# Patient Record
Sex: FEMALE | Race: WHITE
[De-identification: names, ages, dates, MRNs, and addresses within clinical notes are randomized per-mention and may not be internally consistent; named-entity substitution may affect disease eponyms.]

---

## 2017-03-15 ENCOUNTER — HOSPITAL ENCOUNTER (INPATIENT)
Dept: HOSPITAL 47 - EC | Age: 72
LOS: 5 days | Discharge: HOME | DRG: 871 | End: 2017-03-20
Payer: MEDICARE

## 2017-03-15 VITALS — BODY MASS INDEX: 24.9 KG/M2

## 2017-03-15 DIAGNOSIS — Z79.899: ICD-10-CM

## 2017-03-15 DIAGNOSIS — Z80.8: ICD-10-CM

## 2017-03-15 DIAGNOSIS — Z88.5: ICD-10-CM

## 2017-03-15 DIAGNOSIS — Z82.49: ICD-10-CM

## 2017-03-15 DIAGNOSIS — N39.0: ICD-10-CM

## 2017-03-15 DIAGNOSIS — Z99.81: ICD-10-CM

## 2017-03-15 DIAGNOSIS — N18.3: ICD-10-CM

## 2017-03-15 DIAGNOSIS — K44.9: ICD-10-CM

## 2017-03-15 DIAGNOSIS — R94.31: ICD-10-CM

## 2017-03-15 DIAGNOSIS — R51: ICD-10-CM

## 2017-03-15 DIAGNOSIS — I25.2: ICD-10-CM

## 2017-03-15 DIAGNOSIS — T45.515A: ICD-10-CM

## 2017-03-15 DIAGNOSIS — I65.23: ICD-10-CM

## 2017-03-15 DIAGNOSIS — F41.9: ICD-10-CM

## 2017-03-15 DIAGNOSIS — M43.16: ICD-10-CM

## 2017-03-15 DIAGNOSIS — Z79.891: ICD-10-CM

## 2017-03-15 DIAGNOSIS — I50.33: ICD-10-CM

## 2017-03-15 DIAGNOSIS — Z79.82: ICD-10-CM

## 2017-03-15 DIAGNOSIS — J44.1: ICD-10-CM

## 2017-03-15 DIAGNOSIS — M19.90: ICD-10-CM

## 2017-03-15 DIAGNOSIS — Z88.8: ICD-10-CM

## 2017-03-15 DIAGNOSIS — D62: ICD-10-CM

## 2017-03-15 DIAGNOSIS — I08.3: ICD-10-CM

## 2017-03-15 DIAGNOSIS — K21.9: ICD-10-CM

## 2017-03-15 DIAGNOSIS — I13.0: ICD-10-CM

## 2017-03-15 DIAGNOSIS — I48.0: ICD-10-CM

## 2017-03-15 DIAGNOSIS — Z86.010: ICD-10-CM

## 2017-03-15 DIAGNOSIS — Z88.2: ICD-10-CM

## 2017-03-15 DIAGNOSIS — R53.1: ICD-10-CM

## 2017-03-15 DIAGNOSIS — K57.30: ICD-10-CM

## 2017-03-15 DIAGNOSIS — K29.60: ICD-10-CM

## 2017-03-15 DIAGNOSIS — Z87.19: ICD-10-CM

## 2017-03-15 DIAGNOSIS — M46.96: ICD-10-CM

## 2017-03-15 DIAGNOSIS — J44.0: ICD-10-CM

## 2017-03-15 DIAGNOSIS — Z91.040: ICD-10-CM

## 2017-03-15 DIAGNOSIS — F17.210: ICD-10-CM

## 2017-03-15 DIAGNOSIS — Z86.73: ICD-10-CM

## 2017-03-15 DIAGNOSIS — Z71.3: ICD-10-CM

## 2017-03-15 DIAGNOSIS — Z80.1: ICD-10-CM

## 2017-03-15 DIAGNOSIS — Z90.710: ICD-10-CM

## 2017-03-15 DIAGNOSIS — Z95.5: ICD-10-CM

## 2017-03-15 DIAGNOSIS — Z79.01: ICD-10-CM

## 2017-03-15 DIAGNOSIS — Z84.1: ICD-10-CM

## 2017-03-15 DIAGNOSIS — R74.8: ICD-10-CM

## 2017-03-15 DIAGNOSIS — R73.9: ICD-10-CM

## 2017-03-15 DIAGNOSIS — I73.9: ICD-10-CM

## 2017-03-15 DIAGNOSIS — K92.2: ICD-10-CM

## 2017-03-15 DIAGNOSIS — Z90.49: ICD-10-CM

## 2017-03-15 DIAGNOSIS — J45.901: ICD-10-CM

## 2017-03-15 DIAGNOSIS — A41.51: Primary | ICD-10-CM

## 2017-03-15 DIAGNOSIS — I27.2: ICD-10-CM

## 2017-03-15 DIAGNOSIS — I25.5: ICD-10-CM

## 2017-03-15 DIAGNOSIS — M51.36: ICD-10-CM

## 2017-03-15 DIAGNOSIS — E78.5: ICD-10-CM

## 2017-03-15 DIAGNOSIS — J96.01: ICD-10-CM

## 2017-03-15 DIAGNOSIS — I25.10: ICD-10-CM

## 2017-03-15 LAB
ALP SERPL-CCNC: 83 U/L (ref 38–126)
ALT SERPL-CCNC: 27 U/L (ref 9–52)
ANION GAP SERPL CALC-SCNC: 11 MMOL/L
APTT BLD: 22.1 SEC (ref 22–30)
AST SERPL-CCNC: 22 U/L (ref 14–36)
BUN SERPL-SCNC: 14 MG/DL (ref 7–17)
CALCIUM SPEC-MCNC: 8.6 MG/DL (ref 8.4–10.2)
CELLS COUNTED: 100
CH: 23.6
CHCM: 28.9
CHLORIDE SERPL-SCNC: 101 MMOL/L (ref 98–107)
CK SERPL-CCNC: 29 U/L (ref 30–135)
CO2 SERPL-SCNC: 27 MMOL/L (ref 22–30)
ERYTHROCYTE [DISTWIDTH] IN BLOOD BY AUTOMATED COUNT: 2.98 M/UL (ref 3.8–5.4)
ERYTHROCYTE [DISTWIDTH] IN BLOOD: 16.7 % (ref 11.5–15.5)
GLUCOSE BLD-MCNC: 126 MG/DL (ref 75–99)
GLUCOSE SERPL-MCNC: 111 MG/DL (ref 74–99)
HCT VFR BLD AUTO: 24.2 % (ref 34–46)
HDW: 4.52
HGB BLD-MCNC: 7.1 GM/DL (ref 11.4–16)
INR PPP: 1.1 (ref ?–1.1)
MAGNESIUM SPEC-SCNC: 2.1 MG/DL (ref 1.6–2.3)
MCH RBC QN AUTO: 23.8 PG (ref 25–35)
MCHC RBC AUTO-ENTMCNC: 29.3 G/DL (ref 31–37)
MCV RBC AUTO: 81.1 FL (ref 80–100)
NON-AFRICAN AMERICAN GFR(MDRD): >60
PARTICLE COUNT: (no result)
PH UR: 6 [PH] (ref 5–8)
POTASSIUM SERPL-SCNC: 4.1 MMOL/L (ref 3.5–5.1)
PROT SERPL-MCNC: 6.1 G/DL (ref 6.3–8.2)
PROT UR QL: (no result)
PT BLD: 10.6 SEC (ref 9–12)
SODIUM SERPL-SCNC: 139 MMOL/L (ref 137–145)
SP GR UR: 1.01 (ref 1–1.03)
SQUAMOUS UR QL AUTO: <1 /HPF (ref 0–4)
TROPONIN I SERPL-MCNC: 0.09 NG/ML (ref 0–0.03)
UA BILLING (MACRO VS. MICRO): (no result)
UROBILINOGEN UR QL STRIP: <2 MG/DL (ref ?–2)
WBC # BLD AUTO: 8.7 K/UL (ref 3.8–10.6)
WBC #/AREA URNS HPF: 31 /HPF (ref 0–5)
WBC (PEROX): 9.38

## 2017-03-15 PROCEDURE — 71020: CPT

## 2017-03-15 PROCEDURE — 87040 BLOOD CULTURE FOR BACTERIA: CPT

## 2017-03-15 PROCEDURE — 30233N1 TRANSFUSION OF NONAUTOLOGOUS RED BLOOD CELLS INTO PERIPHERAL VEIN, PERCUTANEOUS APPROACH: ICD-10-PCS

## 2017-03-15 PROCEDURE — 93005 ELECTROCARDIOGRAM TRACING: CPT

## 2017-03-15 PROCEDURE — 85610 PROTHROMBIN TIME: CPT

## 2017-03-15 PROCEDURE — 94760 N-INVAS EAR/PLS OXIMETRY 1: CPT

## 2017-03-15 PROCEDURE — 87077 CULTURE AEROBIC IDENTIFY: CPT

## 2017-03-15 PROCEDURE — 83036 HEMOGLOBIN GLYCOSYLATED A1C: CPT

## 2017-03-15 PROCEDURE — 85025 COMPLETE CBC W/AUTO DIFF WBC: CPT

## 2017-03-15 PROCEDURE — 91110 GI TRC IMG INTRAL ESOPH-ILE: CPT

## 2017-03-15 PROCEDURE — 94660 CPAP INITIATION&MGMT: CPT

## 2017-03-15 PROCEDURE — 82550 ASSAY OF CK (CPK): CPT

## 2017-03-15 PROCEDURE — 83605 ASSAY OF LACTIC ACID: CPT

## 2017-03-15 PROCEDURE — 87086 URINE CULTURE/COLONY COUNT: CPT

## 2017-03-15 PROCEDURE — 81001 URINALYSIS AUTO W/SCOPE: CPT

## 2017-03-15 PROCEDURE — 84100 ASSAY OF PHOSPHORUS: CPT

## 2017-03-15 PROCEDURE — 96367 TX/PROPH/DG ADDL SEQ IV INF: CPT

## 2017-03-15 PROCEDURE — 84484 ASSAY OF TROPONIN QUANT: CPT

## 2017-03-15 PROCEDURE — 83880 ASSAY OF NATRIURETIC PEPTIDE: CPT

## 2017-03-15 PROCEDURE — 85730 THROMBOPLASTIN TIME PARTIAL: CPT

## 2017-03-15 PROCEDURE — 87186 SC STD MICRODIL/AGAR DIL: CPT

## 2017-03-15 PROCEDURE — 83540 ASSAY OF IRON: CPT

## 2017-03-15 PROCEDURE — 70450 CT HEAD/BRAIN W/O DYE: CPT

## 2017-03-15 PROCEDURE — 0T9B70Z DRAINAGE OF BLADDER WITH DRAINAGE DEVICE, VIA NATURAL OR ARTIFICIAL OPENING: ICD-10-PCS

## 2017-03-15 PROCEDURE — 96365 THER/PROPH/DIAG IV INF INIT: CPT

## 2017-03-15 PROCEDURE — 71010: CPT

## 2017-03-15 PROCEDURE — 80048 BASIC METABOLIC PNL TOTAL CA: CPT

## 2017-03-15 PROCEDURE — 82272 OCCULT BLD FECES 1-3 TESTS: CPT

## 2017-03-15 PROCEDURE — 82728 ASSAY OF FERRITIN: CPT

## 2017-03-15 PROCEDURE — 36415 COLL VENOUS BLD VENIPUNCTURE: CPT

## 2017-03-15 PROCEDURE — 82553 CREATINE MB FRACTION: CPT

## 2017-03-15 PROCEDURE — 36430 TRANSFUSION BLD/BLD COMPNT: CPT

## 2017-03-15 PROCEDURE — 80053 COMPREHEN METABOLIC PANEL: CPT

## 2017-03-15 PROCEDURE — 83735 ASSAY OF MAGNESIUM: CPT

## 2017-03-15 PROCEDURE — 86850 RBC ANTIBODY SCREEN: CPT

## 2017-03-15 PROCEDURE — 86920 COMPATIBILITY TEST SPIN: CPT

## 2017-03-15 PROCEDURE — 86900 BLOOD TYPING SEROLOGIC ABO: CPT

## 2017-03-15 PROCEDURE — 86901 BLOOD TYPING SEROLOGIC RH(D): CPT

## 2017-03-15 PROCEDURE — 94640 AIRWAY INHALATION TREATMENT: CPT

## 2017-03-15 PROCEDURE — 93306 TTE W/DOPPLER COMPLETE: CPT

## 2017-03-15 PROCEDURE — 87502 INFLUENZA DNA AMP PROBE: CPT

## 2017-03-15 PROCEDURE — 96361 HYDRATE IV INFUSION ADD-ON: CPT

## 2017-03-15 PROCEDURE — 83550 IRON BINDING TEST: CPT

## 2017-03-15 PROCEDURE — 99291 CRITICAL CARE FIRST HOUR: CPT

## 2017-03-15 RX ADMIN — HYDROMORPHONE HYDROCHLORIDE PRN MG: 1 INJECTION, SOLUTION INTRAMUSCULAR; INTRAVENOUS; SUBCUTANEOUS at 23:23

## 2017-03-15 RX ADMIN — SODIUM CHLORIDE, PRESERVATIVE FREE SCH ML: 5 INJECTION INTRAVENOUS at 22:58

## 2017-03-15 RX ADMIN — ACETAMINOPHEN STA: 500 TABLET ORAL at 19:09

## 2017-03-15 RX ADMIN — ACETAMINOPHEN STA MG: 500 TABLET ORAL at 18:00

## 2017-03-15 NOTE — ED
General Adult HPI





- General


Source: patient, EMS, RN notes reviewed


Mode of arrival: EMS


Limitations: no limitations





<Mj Norris - Last Filed: 03/15/17 18:57>





<Deuce Price - Last Filed: 03/15/17 20:58>





- General


Chief complaint: Shortness of Breath


Stated complaint: JOSE R


Time Seen by Provider: 03/15/17 17:45





- History of Present Illness


Initial comments: 





This is a 71-year-old female presents emergency Department with a high fever or 

shortness of breath positive sputum production with a lot of coughing.  Patient 

complains of quite a bit of chills and a mild headache.  Patient did not want 

take breathing treatment in route because she was so cold she just wanted to be 

covered up according to EMS.  Patient denies any chest pain or palpitations.  

Patient denies abdominal pain patient denies nausea vomiting diarrhea.  Patient 

denies any neck pain or stiffness.  Patient denies any lightheadedness 

dizziness or near syncopal episode.  Patient denies any dysuria hematuria 

urinary frequency. (Mj Norris)





- Related Data


 Home Medications











 Medication  Instructions  Recorded  Confirmed


 


Montelukast Sodium [Singulair] 10 mg PO HS 09/20/14 03/15/17


 


ALPRAZolam [Xanax] 0.5 mg PO DAILY 12/05/15 03/15/17


 


Apixaban [Eliquis] 2.5 mg PO BID 12/05/15 03/15/17


 


Aspirin 81 mg PO DAILY 12/05/15 03/15/17


 


Metoprolol Tartrate [Lopressor] 25 mg PO BID 12/05/15 03/15/17


 


Nitroglycerin Sl Tabs [Nitrostat] 0.4 mg SUBLINGUAL Q5M PRN 12/05/15 03/15/17


 


Atorvastatin [Lipitor] 40 mg PO HS 12/06/15 03/15/17


 


Ergocalciferol [Vitamin D2] 50,000 unit PO Q7D 03/15/17 03/15/17


 


Ipratropium-Albuterol Nebulize 3 ml INHALATION RT-QID PRN 03/15/17 03/15/17





[Duoneb 0.5 mg-3 mg/3 ml Soln]   


 


Melatonin 3 mg PO HS 03/15/17 03/15/17


 


Multivitamins, Thera [Multivitamin] 1 tab PO DAILY 03/15/17 03/15/17


 


Omeprazole 40 mg PO DAILY 03/15/17 03/15/17


 


amLODIPine [Norvasc] 10 mg PO DAILY 03/15/17 03/15/17


 


hydrALAZINE HCL [Apresoline] 75 mg PO Q8H 03/15/17 03/15/17


 


oxyCODONE HCL 20 mg PO TID 03/15/17 03/15/17








 Previous Rx's











 Medication  Instructions  Recorded


 


Losartan [Cozaar] 50 mg PO DAILY #30 tab 12/08/15











 Allergies











Allergy/AdvReac Type Severity Reaction Status Date / Time


 


latex Allergy  Rash/Hives Verified 03/15/17 18:15


 


morphine Allergy  Unknown Verified 03/15/17 18:15


 


pregabalin [From Lyrica] Allergy  Unknown Verified 03/15/17 18:15


 


Sulfa (Sulfonamide Allergy  Unknown Verified 03/15/17 18:15





Antibiotics)     














Review of Systems


ROS Other: All systems not noted in ROS Statement are negative.





<Mj Norris - Last Filed: 03/15/17 18:57>


ROS Other: All systems not noted in ROS Statement are negative.





<Deuce Price - Last Filed: 03/15/17 20:58>


ROS Statement: 


Those systems with pertinent positive or pertinent negative responses have been 

documented in the HPI.








Past Medical History


Past Medical History: Asthma, COPD, CVA/TIA, GERD/Reflux, Hypertension, Mitral 

Valve Prolapse (MVP), Vascular Disorder


Additional Past Medical History / Comment(s): diverticulitits


Last Myocardial Infarction Date:: unknown


History of Any Multi-Drug Resistant Organisms: None Reported


Past Surgical History: Cholecystectomy, Heart Catheterization With Stent, 

Hysterectomy, Orthopedic Surgery


Additional Past Surgical History / Comment(s): carotid


Past Anesthesia/Blood Transfusion Reactions: No Reported Reaction


Date of Last Stent Placement:: 


Past Psychological History: No Psychological Hx Reported


Smoking Status: Current every day smoker


Past Alcohol Use History: None Reported


Past Drug Use History: None Reported





- Past Family History


  ** Mother


Family Medical History: Congestive Heart Failure (CHF) (Mother  at age of 

76 from congestive heart failure)





  ** Father


Family Medical History: Cancer (Father  at age of 73 from bone cancer)


Additional Family Medical History / Comment(s): Bone ca





  ** Sister(s)


Family Medical History: Coronary Artery Disease (CAD) (Patient has one sister 

with coronary artery disease status post coronary artery bypass graft)





  ** Brother(s)


Family Medical History: Myocardial Infarction (MI) (She has a brother who had a 

myocardial infarction.)





<Mj Norris - Last Filed: 03/15/17 18:57>





General Exam


Limitations: no limitations





<Mj Norris - Last Filed: 03/15/17 18:57>





<Deuce Price - Last Filed: 03/15/17 20:58>





- General Exam Comments


Initial Comments: 





GENERAL:


Patient is well-developed and well-nourished.  Patient is nontoxic and well-

hydrated and is in mild distress.





ENT:


Neck is soft and supple.  No significant lymphadenopathy is noted.  Oropharynx 

is clear.  Moist mucous membranes.  Neck has full range of motion without 

eliciting any pain.  





EYES:


The sclera were anicteric and conjunctiva were pink and moist.  Extraocular 

movements were intact and pupils were equal round and reactive to light.  

Eyelids were unremarkable.





PULMONARY:


Unlabored respirations.  Good breath sounds bilaterally.  No audible rales 

rhonchi or wheezing was noted.





CARDIOVASCULAR:


There is a regular rate and rhythm without any murmurs gallops or rubs.  





ABDOMEN:


Soft and nontender with normal bowel sounds.  No palpable organomegaly was 

noted.  There is no palpable pulsatile mass.





SKIN:


Skin is clear with no lesions or rashes and otherwise unremarkable.





NEUROLOGIC:


Patient is alert and oriented x3.  Cranial nerves II through XII are grossly 

intact.  Motor and sensory are also intact.  Normal speech, volume and content.

  Symmetrical smile.  





MUSCULOSKELETAL:


Normal extremities with adequate strength and full range of motion.  No lower 

extremity swelling or edema.  No calf tenderness.





LYMPHATICS:


No significant lymphadenopathy is noted





PSYCHIATRIC:


Difficult to assess patient was agitated because she was uncomfortable with the 

chills.   (Mj Norris)





Medical Decision Making





- Lab Data


Result diagrams: 


 03/15/17 17:41





 03/15/17 17:41





<Mj Norris - Last Filed: 03/15/17 18:57>





- Lab Data


Result diagrams: 


 03/15/17 17:41





 03/15/17 17:41





- Radiology Data


Radiology results: report reviewed (Computed tomography scan of the brain shows 

no acute process.), image reviewed (Chest x-ray shows interstitial prominence.)





<Deuce Price - Last Filed: 03/15/17 20:58>





- Medical Decision Making





EKG shows a sinus tachycardia 108 bpm ME interval 274 QRS is 78 QT interval 338 

QTC is 1952.  Patient's EKG shows some ST segment depression in leads V4 

through V6.





Dr. Price with taking over the care of this patient at 7 PM





 (Mj Norris)





Patient reevaluated twice by myself.  Patient is now significantly improved 

with BiPAP.  Patient likely has commendations COPD with CHF.  Patient also has 

urinary tract infection and does meet sepsis criteria.  IV antibiotic will be 

started.  Sepsis diagnosed at 8:50 PM.  Case was discussed in detail twice with 

Dr. Hood who will admit his patient.





Patient states she sees Dr. Bolton for pulmonary. (Deuce Price)





- Lab Data


 Lab Results











  03/15/17 03/15/17 03/15/17 Range/Units





  17:41 17:41 17:41 


 


WBC   8.7   (3.8-10.6)  k/uL


 


RBC   2.98 L   (3.80-5.40)  m/uL


 


Hgb   7.1 L   (11.4-16.0)  gm/dL


 


Hct   24.2 L   (34.0-46.0)  %


 


MCV   81.1   (80.0-100.0)  fL


 


MCH   23.8 L   (25.0-35.0)  pg


 


MCHC   29.3 L   (31.0-37.0)  g/dL


 


RDW   16.7 H   (11.5-15.5)  %


 


Plt Count   236   (150-450)  k/uL


 


Neutrophils % (Manual)   93.0   %


 


Lymphocytes % (Manual)   5.0   %


 


Monocytes % (Manual)   2.0   %


 


Neutrophils # (Manual)   8.1 H   (1.3-7.7)  k/uL


 


Lymphocytes # (Manual)   0.4 L   (1.0-4.8)  k/uL


 


Monocytes # (Manual)   0.2   (0-1.0)  k/uL


 


Nucleated RBCs   0   (0-0)  /100 WBC


 


Manual Slide Review   Performed   


 


Polychromasia   Present   


 


Hypochromasia   Marked   


 


Hypochromasia (manual)   Present   


 


Poikilocytosis   Moderate   


 


Anisocytosis   Slight   


 


Anisocytosis (manual)   Present   


 


Stomatocytes   Present   


 


PT     (9.0-12.0)  sec


 


INR     (<1.1)  


 


APTT     (22.0-30.0)  sec


 


Sodium    139  (137-145)  mmol/L


 


Potassium    4.1  (3.5-5.1)  mmol/L


 


Chloride    101  ()  mmol/L


 


Carbon Dioxide    27  (22-30)  mmol/L


 


Anion Gap    11  mmol/L


 


BUN    14  (7-17)  mg/dL


 


Creatinine    0.80  (0.52-1.04)  mg/dL


 


Est GFR (MDRD) Af Amer    >60  (>60 ml/min/1.73 sqM)  


 


Est GFR (MDRD) Non-Af    >60  (>60 ml/min/1.73 sqM)  


 


Glucose    111 H  (74-99)  mg/dL


 


Plasma Lactic Acid Holland     (0.7-2.0)  mmol/L


 


Calcium    8.6  (8.4-10.2)  mg/dL


 


Magnesium    2.1  (1.6-2.3)  mg/dL


 


Total Bilirubin    0.6  (0.2-1.3)  mg/dL


 


AST    22  (14-36)  U/L


 


ALT    27  (9-52)  U/L


 


Alkaline Phosphatase    83  ()  U/L


 


Total Creatine Kinase  29 L    ()  U/L


 


CK-MB (CK-2)  0.5    (0.0-2.4)  ng/mL


 


CK-MB (CK-2) Rel Index  1.7    


 


Troponin I  0.089 H*    (0.000-0.034)  ng/mL


 


NT-Pro-B Natriuret Pep     pg/mL


 


Total Protein    6.1 L  (6.3-8.2)  g/dL


 


Albumin    3.6  (3.5-5.0)  g/dL


 


Urine Color     


 


Urine Appearance     (Clear)  


 


Urine pH     (5.0-8.0)  


 


Ur Specific Gravity     (1.001-1.035)  


 


Urine Protein     (Negative)  


 


Urine Glucose (UA)     (Negative)  


 


Urine Ketones     (Negative)  


 


Urine Blood     (Negative)  


 


Urine Nitrite     (Negative)  


 


Urine Bilirubin     (Negative)  


 


Urine Urobilinogen     (<2.0)  mg/dL


 


Ur Leukocyte Esterase     (Negative)  


 


Urine WBC     (0-5)  /hpf


 


Urine WBC Clumps     (None)  /hpf


 


Ur Squamous Epith Cells     (0-4)  /hpf


 


Urine Bacteria     (None)  /hpf


 


Hyaline Casts     (0-2)  /lpf


 


Urine Mucus     (None)  /hpf


 


Stool Occult Blood     (Negative)  


 


Influenza Type A RNA     (Not Detectd)  


 


Influenza Type B (PCR)     (Not Detectd)  


 


Blood Type     


 


Blood Type Recheck     


 


Antibody Screen     


 


Crossmatch     


 


Spec Expiration Date     














  03/15/17 03/15/17 03/15/17 Range/Units





  17:41 17:41 17:41 


 


WBC     (3.8-10.6)  k/uL


 


RBC     (3.80-5.40)  m/uL


 


Hgb     (11.4-16.0)  gm/dL


 


Hct     (34.0-46.0)  %


 


MCV     (80.0-100.0)  fL


 


MCH     (25.0-35.0)  pg


 


MCHC     (31.0-37.0)  g/dL


 


RDW     (11.5-15.5)  %


 


Plt Count     (150-450)  k/uL


 


Neutrophils % (Manual)     %


 


Lymphocytes % (Manual)     %


 


Monocytes % (Manual)     %


 


Neutrophils # (Manual)     (1.3-7.7)  k/uL


 


Lymphocytes # (Manual)     (1.0-4.8)  k/uL


 


Monocytes # (Manual)     (0-1.0)  k/uL


 


Nucleated RBCs     (0-0)  /100 WBC


 


Manual Slide Review     


 


Polychromasia     


 


Hypochromasia     


 


Hypochromasia (manual)     


 


Poikilocytosis     


 


Anisocytosis     


 


Anisocytosis (manual)     


 


Stomatocytes     


 


PT  10.6    (9.0-12.0)  sec


 


INR  1.1    (<1.1)  


 


APTT  22.1    (22.0-30.0)  sec


 


Sodium     (137-145)  mmol/L


 


Potassium     (3.5-5.1)  mmol/L


 


Chloride     ()  mmol/L


 


Carbon Dioxide     (22-30)  mmol/L


 


Anion Gap     mmol/L


 


BUN     (7-17)  mg/dL


 


Creatinine     (0.52-1.04)  mg/dL


 


Est GFR (MDRD) Af Amer     (>60 ml/min/1.73 sqM)  


 


Est GFR (MDRD) Non-Af     (>60 ml/min/1.73 sqM)  


 


Glucose     (74-99)  mg/dL


 


Plasma Lactic Acid [place]    0.9  (0.7-2.0)  mmol/L


 


Calcium     (8.4-10.2)  mg/dL


 


Magnesium     (1.6-2.3)  mg/dL


 


Total Bilirubin     (0.2-1.3)  mg/dL


 


AST     (14-36)  U/L


 


ALT     (9-52)  U/L


 


Alkaline Phosphatase     ()  U/L


 


Total Creatine Kinase     ()  U/L


 


CK-MB (CK-2)     (0.0-2.4)  ng/mL


 


CK-MB (CK-2) Rel Index     


 


Troponin I     (0.000-0.034)  ng/mL


 


NT-Pro-B Natriuret Pep     pg/mL


 


Total Protein     (6.3-8.2)  g/dL


 


Albumin     (3.5-5.0)  g/dL


 


Urine Color     


 


Urine Appearance     (Clear)  


 


Urine pH     (5.0-8.0)  


 


Ur Specific Gravity     (1.001-1.035)  


 


Urine Protein     (Negative)  


 


Urine Glucose (UA)     (Negative)  


 


Urine Ketones     (Negative)  


 


Urine Blood     (Negative)  


 


Urine Nitrite     (Negative)  


 


Urine Bilirubin     (Negative)  


 


Urine Urobilinogen     (<2.0)  mg/dL


 


Ur Leukocyte Esterase     (Negative)  


 


Urine WBC     (0-5)  /hpf


 


Urine WBC Clumps     (None)  /hpf


 


Ur Squamous Epith Cells     (0-4)  /hpf


 


Urine Bacteria     (None)  /hpf


 


Hyaline Casts     (0-2)  /lpf


 


Urine Mucus     (None)  /hpf


 


Stool Occult Blood     (Negative)  


 


Influenza Type A RNA   Not Detected   (Not Detectd)  


 


Influenza Type B (PCR)   Not Detected   (Not Detectd)  


 


Blood Type     


 


Blood Type Recheck     


 


Antibody Screen     


 


Crossmatch     


 


Spec Expiration Date     














  03/15/17 03/15/17 03/15/17 Range/Units





  17:48 19:40 19:45 


 


WBC     (3.8-10.6)  k/uL


 


RBC     (3.80-5.40)  m/uL


 


Hgb     (11.4-16.0)  gm/dL


 


Hct     (34.0-46.0)  %


 


MCV     (80.0-100.0)  fL


 


MCH     (25.0-35.0)  pg


 


MCHC     (31.0-37.0)  g/dL


 


RDW     (11.5-15.5)  %


 


Plt Count     (150-450)  k/uL


 


Neutrophils % (Manual)     %


 


Lymphocytes % (Manual)     %


 


Monocytes % (Manual)     %


 


Neutrophils # (Manual)     (1.3-7.7)  k/uL


 


Lymphocytes # (Manual)     (1.0-4.8)  k/uL


 


Monocytes # (Manual)     (0-1.0)  k/uL


 


Nucleated RBCs     (0-0)  /100 WBC


 


Manual Slide Review     


 


Polychromasia     


 


Hypochromasia     


 


Hypochromasia (manual)     


 


Poikilocytosis     


 


Anisocytosis     


 


Anisocytosis (manual)     


 


Stomatocytes     


 


PT     (9.0-12.0)  sec


 


INR     (<1.1)  


 


APTT     (22.0-30.0)  sec


 


Sodium     (137-145)  mmol/L


 


Potassium     (3.5-5.1)  mmol/L


 


Chloride     ()  mmol/L


 


Carbon Dioxide     (22-30)  mmol/L


 


Anion Gap     mmol/L


 


BUN     (7-17)  mg/dL


 


Creatinine     (0.52-1.04)  mg/dL


 


Est GFR (MDRD) Af Amer     (>60 ml/min/1.73 sqM)  


 


Est GFR (MDRD) Non-Af     (>60 ml/min/1.73 sqM)  


 


Glucose     (74-99)  mg/dL


 


Plasma Lactic Acid [place]     (0.7-2.0)  mmol/L


 


Calcium     (8.4-10.2)  mg/dL


 


Magnesium     (1.6-2.3)  mg/dL


 


Total Bilirubin     (0.2-1.3)  mg/dL


 


AST     (14-36)  U/L


 


ALT     (9-52)  U/L


 


Alkaline Phosphatase     ()  U/L


 


Total Creatine Kinase     ()  U/L


 


CK-MB (CK-2)     (0.0-2.4)  ng/mL


 


CK-MB (CK-2) Rel Index     


 


Troponin I     (0.000-0.034)  ng/mL


 


NT-Pro-B Natriuret Pep   5180   pg/mL


 


Total Protein     (6.3-8.2)  g/dL


 


Albumin     (3.5-5.0)  g/dL


 


Urine Color     


 


Urine Appearance     (Clear)  


 


Urine pH     (5.0-8.0)  


 


Ur Specific Gravity     (1.001-1.035)  


 


Urine Protein     (Negative)  


 


Urine Glucose (UA)     (Negative)  


 


Urine Ketones     (Negative)  


 


Urine Blood     (Negative)  


 


Urine Nitrite     (Negative)  


 


Urine Bilirubin     (Negative)  


 


Urine Urobilinogen     (<2.0)  mg/dL


 


Ur Leukocyte Esterase     (Negative)  


 


Urine WBC     (0-5)  /hpf


 


Urine WBC Clumps     (None)  /hpf


 


Ur Squamous Epith Cells     (0-4)  /hpf


 


Urine Bacteria     (None)  /hpf


 


Hyaline Casts     (0-2)  /lpf


 


Urine Mucus     (None)  /hpf


 


Stool Occult Blood    Positive H  (Negative)  


 


Influenza Type A RNA     (Not Detectd)  


 


Influenza Type B (PCR)     (Not Detectd)  


 


Blood Type  O Negative    


 


Blood Type Recheck  No    


 


Antibody Screen  NEGATIVE    


 


Crossmatch  See Detail    


 


Spec Expiration Date  2017 - 2348    














  03/15/17 Range/Units





  19:57 


 


WBC   (3.8-10.6)  k/uL


 


RBC   (3.80-5.40)  m/uL


 


Hgb   (11.4-16.0)  gm/dL


 


Hct   (34.0-46.0)  %


 


MCV   (80.0-100.0)  fL


 


MCH   (25.0-35.0)  pg


 


MCHC   (31.0-37.0)  g/dL


 


RDW   (11.5-15.5)  %


 


Plt Count   (150-450)  k/uL


 


Neutrophils % (Manual)   %


 


Lymphocytes % (Manual)   %


 


Monocytes % (Manual)   %


 


Neutrophils # (Manual)   (1.3-7.7)  k/uL


 


Lymphocytes # (Manual)   (1.0-4.8)  k/uL


 


Monocytes # (Manual)   (0-1.0)  k/uL


 


Nucleated RBCs   (0-0)  /100 WBC


 


Manual Slide Review   


 


Polychromasia   


 


Hypochromasia   


 


Hypochromasia (manual)   


 


Poikilocytosis   


 


Anisocytosis   


 


Anisocytosis (manual)   


 


Stomatocytes   


 


PT   (9.0-12.0)  sec


 


INR   (<1.1)  


 


APTT   (22.0-30.0)  sec


 


Sodium   (137-145)  mmol/L


 


Potassium   (3.5-5.1)  mmol/L


 


Chloride   ()  mmol/L


 


Carbon Dioxide   (22-30)  mmol/L


 


Anion Gap   mmol/L


 


BUN   (7-17)  mg/dL


 


Creatinine   (0.52-1.04)  mg/dL


 


Est GFR (MDRD) Af Amer   (>60 ml/min/1.73 sqM)  


 


Est GFR (MDRD) Non-Af   (>60 ml/min/1.73 sqM)  


 


Glucose   (74-99)  mg/dL


 


Plasma Lactic Acid Holland   (0.7-2.0)  mmol/L


 


Calcium   (8.4-10.2)  mg/dL


 


Magnesium   (1.6-2.3)  mg/dL


 


Total Bilirubin   (0.2-1.3)  mg/dL


 


AST   (14-36)  U/L


 


ALT   (9-52)  U/L


 


Alkaline Phosphatase   ()  U/L


 


Total Creatine Kinase   ()  U/L


 


CK-MB (CK-2)   (0.0-2.4)  ng/mL


 


CK-MB (CK-2) Rel Index   


 


Troponin I   (0.000-0.034)  ng/mL


 


NT-Pro-B Natriuret Pep   pg/mL


 


Total Protein   (6.3-8.2)  g/dL


 


Albumin   (3.5-5.0)  g/dL


 


Urine Color  Yellow  


 


Urine Appearance  Cloudy H  (Clear)  


 


Urine pH  6.0  (5.0-8.0)  


 


Ur Specific Gravity  1.011  (1.001-1.035)  


 


Urine Protein  2+ H  (Negative)  


 


Urine Glucose (UA)  Trace H  (Negative)  


 


Urine Ketones  Negative  (Negative)  


 


Urine Blood  Negative  (Negative)  


 


Urine Nitrite  Negative  (Negative)  


 


Urine Bilirubin  Negative  (Negative)  


 


Urine Urobilinogen  <2.0  (<2.0)  mg/dL


 


Ur Leukocyte Esterase  Small H  (Negative)  


 


Urine WBC  31 H  (0-5)  /hpf


 


Urine WBC Clumps  Occasional H  (None)  /hpf


 


Ur Squamous Epith Cells  <1  (0-4)  /hpf


 


Urine Bacteria  Occasional H  (None)  /hpf


 


Hyaline Casts  3 H  (0-2)  /lpf


 


Urine Mucus  Rare H  (None)  /hpf


 


Stool Occult Blood   (Negative)  


 


Influenza Type A RNA   (Not Detectd)  


 


Influenza Type B (PCR)   (Not Detectd)  


 


Blood Type   


 


Blood Type Recheck   


 


Antibody Screen   


 


Crossmatch   


 


Spec Expiration Date   














Critical Care Time


Critical Care Time: Yes


Total Critical Care Time: 31





<Deuce Price - Last Filed: 03/15/17 20:58>





Disposition





<Mj Norris - Last Filed: 03/15/17 18:57>





<Deuce Price - Last Filed: 03/15/17 20:58>


Clinical Impression: 


 Respiratory failure, Sepsis, Urinary tract infection, GI hemorrhage, CHF (

congestive heart failure), COPD (chronic obstructive pulmonary disease)





Disposition: ADMITTED AS IP TO THIS HOSP


Condition: Serious


Referrals: 


Kinjal Hood MD [Primary Care Provider] - 1-2 days

## 2017-03-15 NOTE — XR
EXAMINATION TYPE: XR chest 2V

 

DATE OF EXAM: 3/15/2017 6:22 PM

 

COMPARISON: December 5, 2015 portable chest x-ray

 

HISTORY: Dyspnea

 

TECHNIQUE:  Frontal and lateral views of the chest are obtained.

 

FINDINGS: There is a prominent fine reticular pattern of increased density throughout the lung parenc
hyma symmetrically which prominently silhouettes the pulmonary vasculature symmetrically throughout t
he upper, mid and lower lung zones. There is thickening of the minor fissure. The lung examination is
 otherwise unremarkable. The cardiac silhouette is borderline enlarged. 

 

The pleural spaces positive for small right pleural effusion. 

 

Bones and soft tissues are unremarkable.

 

IMPRESSION:  

PROMINENT INTERSTITIAL PHASE PULMONARY EDEMA.

## 2017-03-15 NOTE — CT
EXAMINATION TYPE: CT brain wo con

 

DATE OF EXAM: 3/15/2017 8:40 PM

 

COMPARISON: 12/5/2015

 

HISTORY: Altered mental status with fever.

 

CT DLP: 1162.8 mGycm. Automated exposure control for dose reduction was used.

 

FINDINGS: 

There is no acute intracranial hemorrhage, mass effect, or midline shift identified. A focal region o
f encephalomalacia located in the anterior left frontal lobe is redemonstrated without interval randhawa
e, consistent with prior infarction. 

 

The ventricles and sulci are within normal limits in size.  

The globes are intact and the visualized sinuses are clear. 

Skeletal structures are unremarkable.

 

IMPRESSION: 

NO ACUTE PROCESS, CT HEAD WITHOUT CONTRAST.

## 2017-03-16 LAB
ALP SERPL-CCNC: 66 U/L (ref 38–126)
ALT SERPL-CCNC: 29 U/L (ref 9–52)
ANION GAP SERPL CALC-SCNC: 7 MMOL/L
AST SERPL-CCNC: 15 U/L (ref 14–36)
BUN SERPL-SCNC: 15 MG/DL (ref 7–17)
CALCIUM SPEC-MCNC: 8.2 MG/DL (ref 8.4–10.2)
CELLS COUNTED: 100
CELLS COUNTED: 100
CH: 25.6
CH: 26.7
CHCM: 29.7
CHCM: 30.7
CHLORIDE SERPL-SCNC: 105 MMOL/L (ref 98–107)
CO2 SERPL-SCNC: 26 MMOL/L (ref 22–30)
ERYTHROCYTE [DISTWIDTH] IN BLOOD BY AUTOMATED COUNT: 2.61 M/UL (ref 3.8–5.4)
ERYTHROCYTE [DISTWIDTH] IN BLOOD BY AUTOMATED COUNT: 3.08 M/UL (ref 3.8–5.4)
ERYTHROCYTE [DISTWIDTH] IN BLOOD: 16.8 % (ref 11.5–15.5)
ERYTHROCYTE [DISTWIDTH] IN BLOOD: 16.9 % (ref 11.5–15.5)
GLUCOSE SERPL-MCNC: 135 MG/DL (ref 74–99)
HCT VFR BLD AUTO: 22.3 % (ref 34–46)
HCT VFR BLD AUTO: 26.6 % (ref 34–46)
HDW: 4.65
HDW: 4.82
HGB BLD-MCNC: 6.7 GM/DL (ref 11.4–16)
HGB BLD-MCNC: 8.1 GM/DL (ref 11.4–16)
IRON SERPL-MCNC: 13 UG/DL (ref 37–170)
MAGNESIUM SPEC-SCNC: 2 MG/DL (ref 1.6–2.3)
MCH RBC QN AUTO: 25 PG (ref 25–35)
MCH RBC QN AUTO: 26.3 PG (ref 25–35)
MCHC RBC AUTO-ENTMCNC: 29.3 G/DL (ref 31–37)
MCHC RBC AUTO-ENTMCNC: 30.4 G/DL (ref 31–37)
MCV RBC AUTO: 85.4 FL (ref 80–100)
MCV RBC AUTO: 86.5 FL (ref 80–100)
NON-AFRICAN AMERICAN GFR(MDRD): >60
PHOSPHATE SERPL-MCNC: 4.1 MG/DL (ref 2.5–4.5)
POTASSIUM SERPL-SCNC: 4 MMOL/L (ref 3.5–5.1)
PROT SERPL-MCNC: 5.5 G/DL (ref 6.3–8.2)
SODIUM SERPL-SCNC: 138 MMOL/L (ref 137–145)
TIBC SERPL-MCNC: 369 UG/DL (ref 265–497)
TROPONIN I SERPL-MCNC: 0.71 NG/ML (ref 0–0.03)
TROPONIN I SERPL-MCNC: 0.74 NG/ML (ref 0–0.03)
WBC # BLD AUTO: 6.2 K/UL (ref 3.8–10.6)
WBC # BLD AUTO: 6.2 K/UL (ref 3.8–10.6)
WBC (PEROX): 6.65
WBC (PEROX): 6.75

## 2017-03-16 RX ADMIN — PANTOPRAZOLE SODIUM SCH MG: 40 INJECTION, POWDER, FOR SOLUTION INTRAVENOUS at 08:53

## 2017-03-16 RX ADMIN — SODIUM CHLORIDE, PRESERVATIVE FREE SCH ML: 5 INJECTION INTRAVENOUS at 09:21

## 2017-03-16 RX ADMIN — METHYLPREDNISOLONE SODIUM SUCCINATE SCH MG: 125 INJECTION, POWDER, FOR SOLUTION INTRAMUSCULAR; INTRAVENOUS at 23:50

## 2017-03-16 RX ADMIN — METHYLPREDNISOLONE SODIUM SUCCINATE SCH MG: 125 INJECTION, POWDER, FOR SOLUTION INTRAMUSCULAR; INTRAVENOUS at 11:47

## 2017-03-16 RX ADMIN — LOSARTAN POTASSIUM SCH MG: 50 TABLET, FILM COATED ORAL at 08:51

## 2017-03-16 RX ADMIN — ATORVASTATIN CALCIUM SCH MG: 40 TABLET, FILM COATED ORAL at 21:04

## 2017-03-16 RX ADMIN — BUMETANIDE SCH MG: 0.25 INJECTION, SOLUTION INTRAMUSCULAR; INTRAVENOUS at 20:18

## 2017-03-16 RX ADMIN — BUDESONIDE SCH MG: 0.5 INHALANT ORAL at 19:24

## 2017-03-16 RX ADMIN — FUROSEMIDE SCH: 10 INJECTION, SOLUTION INTRAMUSCULAR; INTRAVENOUS at 09:01

## 2017-03-16 RX ADMIN — IPRATROPIUM BROMIDE AND ALBUTEROL SULFATE SCH ML: .5; 3 SOLUTION RESPIRATORY (INHALATION) at 08:07

## 2017-03-16 RX ADMIN — THERA TABS SCH EACH: TAB at 08:53

## 2017-03-16 RX ADMIN — IPRATROPIUM BROMIDE AND ALBUTEROL SULFATE SCH ML: .5; 3 SOLUTION RESPIRATORY (INHALATION) at 19:18

## 2017-03-16 RX ADMIN — BUMETANIDE SCH MG: 0.25 INJECTION, SOLUTION INTRAMUSCULAR; INTRAVENOUS at 10:02

## 2017-03-16 RX ADMIN — IPRATROPIUM BROMIDE AND ALBUTEROL SULFATE SCH ML: .5; 3 SOLUTION RESPIRATORY (INHALATION) at 11:32

## 2017-03-16 RX ADMIN — BUDESONIDE SCH MG: 0.5 INHALANT ORAL at 08:07

## 2017-03-16 RX ADMIN — METOPROLOL TARTRATE SCH MG: 25 TABLET, FILM COATED ORAL at 21:04

## 2017-03-16 RX ADMIN — METHYLPREDNISOLONE SODIUM SUCCINATE SCH MG: 125 INJECTION, POWDER, FOR SOLUTION INTRAMUSCULAR; INTRAVENOUS at 06:45

## 2017-03-16 RX ADMIN — MONTELUKAST SODIUM SCH MG: 10 TABLET, FILM COATED ORAL at 21:04

## 2017-03-16 RX ADMIN — METHYLPREDNISOLONE SODIUM SUCCINATE SCH MG: 125 INJECTION, POWDER, FOR SOLUTION INTRAMUSCULAR; INTRAVENOUS at 17:07

## 2017-03-16 RX ADMIN — SODIUM CHLORIDE, PRESERVATIVE FREE SCH ML: 5 INJECTION INTRAVENOUS at 21:04

## 2017-03-16 RX ADMIN — METOPROLOL TARTRATE SCH MG: 25 TABLET, FILM COATED ORAL at 08:51

## 2017-03-16 RX ADMIN — HYDROMORPHONE HYDROCHLORIDE PRN MG: 1 INJECTION, SOLUTION INTRAMUSCULAR; INTRAVENOUS; SUBCUTANEOUS at 05:10

## 2017-03-16 RX ADMIN — IPRATROPIUM BROMIDE AND ALBUTEROL SULFATE SCH ML: .5; 3 SOLUTION RESPIRATORY (INHALATION) at 15:52

## 2017-03-16 RX ADMIN — FUROSEMIDE SCH MG: 10 INJECTION, SOLUTION INTRAMUSCULAR; INTRAVENOUS at 08:53

## 2017-03-16 RX ADMIN — FUROSEMIDE SCH: 10 INJECTION, SOLUTION INTRAMUSCULAR; INTRAVENOUS at 05:12

## 2017-03-16 NOTE — XR
EXAMINATION TYPE: XR chest 1V portable

 

DATE OF EXAM: 3/16/2017 11:49 AM

 

CLINICAL HISTORY: Difficulty breathing progress study.  Pneumothorax and subcutaneous emphysema per o
rder

 

TECHNIQUE: Single AP portable upright view of the chest is obtained.

 

COMPARISON: Chest x-ray from one day earlier. CT chest June 30, 2016.

 

FINDINGS:  There is cardiomegaly with atherosclerotic thoracic aorta. Reticular interstitial changes 
bilaterally are redemonstrated. There is no new focal airspace opacity or pneumothorax seen bilateral
ly. Underlying emphysematous change is present seen better on prior CT. Osseous structures are demine
ralized. Degenerative change right acromioclavicular joint is present.

 

IMPRESSION: Chronic emphysematous change and cardiomegaly with suggestion of mild bilateral diffuse i
nterstitial edema and/or infiltrates. No significant change from chest x-ray one day earlier.

## 2017-03-16 NOTE — PN
She was seen again on 3/16/2017.



She has been hemodynamically stable. She is less short of breath. She 

is awake, alert and talkative to me. She is off BiPAP at this time. 

Her blood pressure is 143/76, respiratory rate 28, pulse rate of 74, 

temperature 98.3, O2 sat on 2 L by nasal cannula is 98%. HEENT reveals 

no new changes. Chest reveals decreased breath sounds at the bases. 

There is a wheeze on forced expiration. Cardiovascular system reveals 

S1 and S2. Abdomen is soft. There is no pedal edema.  



White count is 6.2, hemoglobin of 6.7 despite transfusion of 1 unit of 

packed cells yesterday. Sodium 138, potassium 4, chloride 105, bicarb 

26. Glucose 135. Microbiological cultures from the urine have no 

growth.  



IMPRESSION AT THIS TIME: 

1. Fever with chills, possibly secondary to pneumonia. 

2. Asthma with chronic obstructive pulmonary disease with acute 

exacerbation.  

3. Congestive heart failure with small bilateral pleural effusions. 

4. Acute gastrointestinal bleed. 

5. Patient coagulopathic on Eliquis. 



At this point in time Eliquis will be held. Patient will be transfused 

(      ) unit of packed cells.  Patient will be seen by GI. Will keep 

her in negative fluid balance. Continue IV steroids, bronchodilators, 

aerosolized steroids and antibiotics. Depending on how she does, we 

shall make further changes to her care. We will follow up on cultures 

on her. She is counseled regarding her condition and this approach and 

is doing significantly better than yesterday.

## 2017-03-16 NOTE — CONS
DATE OF CONSULTATION:  



I was personally called by Dr. Deuce Price from the ED to see this 

patient.  



Teresa Davis is a 71-year-old female who presented to the ED at 

[facility] with increasing shortness of breath for about 2 

days' duration. This has been associated with some wheezing and fever. 

Her daughter had called her the previous day and she had been talking 

without significant shortness of breath, but today she was quite short 

of breath. She did complain of some chills and rigors, was coughing 

and wheezing with scant sputum production. She subsequently was seen 

in the ED. Initial labs did show that she was quite anemic and blood 

transfusion has been given in the ED. She is being admitted to the ICU 

at this time.  



Her past medical history is positive for asthma, COPD, recent CVA, 

hypertension, gastroesophageal reflux disease, mitral valve prolapse, 

cardiac cath with stent placement, diverticulitis, cholecystectomy, 

possible history of cardiac arrhythmia in the form of A. fib. Patient 

chronically on Eliquis.   



Family history is positive for CHF in her mother. Father had a history 

of cancer. Sister had a history of coronary artery disease and 

coronary artery bypass. Brother had a history of myocardial 

infarction.  



REVIEW OF SYSTEMS: Noncontributory. 



Medications prior to admission were oxycodone, Apresoline, Norvasc, 

omeprazole, Nitrostat, multivitamin, Singulair, Lopressor, melatonin, 

Cozaar, DuoNeb, vitamin D2, Lipitor, Eliquis, aspirin and Xanax.  



Review of systems is noncontributory. 



On physical examination, respiratory rate is 22, pulse rate of 104, 

temperature 98.9, blood pressure 139/96, O2 sat is 100% on BiPAP with 

an FiO2 of 55%. When she came to the ED, she was only 88% on room air; 

subsequently was on nasal cannula oxygen at 4 L and subsequently 

started to worsen and was placed on BiPAP. She had a temperature of 

102 degrees Fahrenheit when she came to the ED initially. HEENT 

reveals pupils are equal. BiPAP mask is in place. There is mild 

jugular distention. Chest reveals decreased breath sounds at the 

bases, prolonged expiration, no clear wheeze at this time, scattered 

crackles in the base. Cardiovascular system reveals an S1, S2. There 

is a systolic murmur at the left sternal border as well as in the 

aortic area. Abdomen is soft. There is no pedal edema.  



Previous echocardiogram done on 12/7/2015 had been read by Dr. Delfina Bowles, which showed evidence of mild concentric left 

ventricular hypertrophy, EF of 50% to 55%, aortic valve sclerosis 

without stenosis, mild mitral regurgitation, mild tricuspid 

regurgitation with no pulmonary hypertension. Chest x-ray shows small 

pleural effusions bilaterally with cardiomegaly and a reticular 

pattern consistent with early interstitial edema.  



Labs revealed a white count of 8.7, hemoglobin of 7.1, platelet count 

236,000. Sodium 139, potassium 4.1, chloride 101, bicarb 27. Lactic 

acid was  0.9. Troponin 0.089. Albumin 3.6. Total protein 6.1 Stool 

occult blood is positive. Influenza A and B are negative.  



IMPRESSION AT THIS TIME: 

1. Fever with shortness of breath with evidence of interstitial edema, 

which may be due to early  atypical pneumonia.  

2. Congestive heart failure with valvular heart disease and 

cardiomyopathy.  

3. Severe anemia. 

4. Coagulopathy in part due to Eliquis is likely. 

5. Elevated troponin, due to possible cardiac etiology. 



At this point in time, agree with admitting the patient to the ICU, 

keep the patient supported on BiPAP,  attempt to keep her in negative 

fluid, balance. Will keep her on diuretics. Agree with transfusing her 

with 1 unit of packed cells. Keep her on IV steroids, bronchodilators, 

aerosolized steroids because of her history of asthma with COPD, and 

history of wheezing just prior to coming into the hospital and when 

she was initially evaluated in the ED per the nurse. Would check blood 

cultures. We will follow the patient closely with you during her 

hospital stay and appreciate the opportunity to participate in her 

care. Would hold off on any anticoagulation at this time. Would use 

SCDs for DVT prophylaxis. Continue pantoprazole for GI prophylaxis.  



I would like to thank you for allowing me the privilege to participate 

in her care.

## 2017-03-16 NOTE — P.CONS
History of Present Illness





- Reason for Consult


Consult date: 03/16/17


Anemia GI bleed


Requesting physician: Kinjal Hood





- History of Present Illness


71-year-old female with a history of anemia, CAD/ PCI stent, ischemic 

cardiomyopathy, valvular heart disease, peripheral arterial occlusive disease, 

carotid artery disease, COPD 2 L O2 dependent, MI, cholecystectomy, GERD, 

hyperlipidemia, atrial fibrillation, CVA/TIA, diverticulosis, and 

osteoarthritis.  Presents with shortness of breath, elevated troponin and 

anemia.  Troponin 0.72.  Hemoglobin 7.1.  MCV 81.  Platelet 236.  Hemoglobin 

this morning 6.7.  She is receiving 2 units of blood.  Denies overt bleeding 

such as hematemesis, hematochezia, or melena however Hemoccult stool positive.  

Evaluated at [facility] last month for anemia; EGD performed by 

Dr. Mejia reported mild gastritis with no evidence of peptic ulcer disease.  EGD 

colonoscopy July 2016 performed by Dr. Weiss reported antral gastritis and 

sigmoid diverticulosis with polypectomy.





Home medications reviewed; up accident 2.5 mill grams twice daily with baby 

aspirin.  Omeprazole 40 mg daily.  Denies nausea, vomiting, epigastric or 

abdominal pain.








Review of Systems


Constitutional: Denies fever, chills, sweats, weight gain, or loss.


HEENT: Negative for migraines, blurred vision or loss, earaches, drainage, 

tinnitus, oral mucosal lesions, dysphagia, or odynophagia.


CARDIAC: Ischemic cardiomyopathy.  MI.  CAD.  PVD.  Atrial fibrillation.  

Valvular heart disease.  Negative for chest pain, arrhythmias, or palpitation.


RESPIRATORY: COPD O2 dependent, asthma, nicotine cigarette dependency.  Reports 

shortness of breath, denies hemoptysis, occasional nonproductive cough.


GI: See HPI for pertinent findings.


: Negative for hematuria, urgency, frequency, polyuria, or dysuria.


GYNc: Denies possibility of pregnancy.  Negative vaginal discharge.


MUSCULOSKELETAL: Negative for muscle aches, swelling, arthritis, and 

arthralgias.  


NEUROLOGIC: Negative for stroke or TIA.


ENDOCRINE: Negative for thyroid problems.


SKIN: Negative for rash or itching.


PSYCHIATRIC: Negative history for depression and anxiety





All systems: negative (See HPI)





Past Medical History


Past Medical History: Asthma, COPD, CVA/TIA, GERD/Reflux, Hypertension, Mitral 

Valve Prolapse (MVP), Vascular Disorder


Additional Past Medical History / Comment(s): diverticulitits


Last Myocardial Infarction Date:: unknown


History of Any Multi-Drug Resistant Organisms: None Reported


Past Surgical History: Cholecystectomy, Heart Catheterization With Stent, 

Hysterectomy, Orthopedic Surgery


Additional Past Surgical History / Comment(s): carotid


Past Anesthesia/Blood Transfusion Reactions: No Reported Reaction


Date of Last Stent Placement:: 2011


Past Psychological History: No Psychological Hx Reported


Smoking Status: Current every day smoker


Past Alcohol Use History: None Reported


Past Drug Use History: None Reported





- Past Family History


  ** Mother


Family Medical History: Congestive Heart Failure (CHF)





  ** Father


Family Medical History: Cancer


Additional Family Medical History / Comment(s): Bone ca





  ** Sister(s)


Family Medical History: Coronary Artery Disease (CAD)





  ** Brother(s)


Family Medical History: Myocardial Infarction (MI)





Medications and Allergies


 Home Medications











 Medication  Instructions  Recorded  Confirmed  Type


 


Montelukast Sodium [Singulair] 10 mg PO HS 09/20/14 03/15/17 History


 


ALPRAZolam [Xanax] 0.5 mg PO DAILY 12/05/15 03/15/17 History


 


Apixaban [Eliquis] 2.5 mg PO BID 12/05/15 03/15/17 History


 


Aspirin 81 mg PO DAILY 12/05/15 03/15/17 History


 


Metoprolol Tartrate [Lopressor] 25 mg PO BID 12/05/15 03/15/17 History


 


Nitroglycerin Sl Tabs [Nitrostat] 0.4 mg SUBLINGUAL Q5M PRN 12/05/15 03/15/17 

History


 


Atorvastatin [Lipitor] 40 mg PO HS 12/06/15 03/15/17 History


 


Ergocalciferol [Vitamin D2] 50,000 unit PO Q7D 03/15/17 03/15/17 History


 


Ipratropium-Albuterol Nebulize 3 ml INHALATION RT-QID PRN 03/15/17 03/15/17 

History





[Duoneb 0.5 mg-3 mg/3 ml Soln]    


 


Melatonin 3 mg PO HS 03/15/17 03/15/17 History


 


Multivitamins, Thera [Multivitamin] 1 tab PO DAILY 03/15/17 03/15/17 History


 


Omeprazole 40 mg PO DAILY 03/15/17 03/15/17 History


 


amLODIPine [Norvasc] 10 mg PO DAILY 03/15/17 03/15/17 History


 


hydrALAZINE HCL [Apresoline] 75 mg PO Q8H 03/15/17 03/15/17 History


 


oxyCODONE HCL 20 mg PO TID 03/15/17 03/15/17 History











 Allergies











Allergy/AdvReac Type Severity Reaction Status Date / Time


 


furosemide [From Lasix] Allergy Intermediate Confusion Verified 03/16/17 09:14


 


latex Allergy  Rash/Hives Verified 03/15/17 18:15


 


morphine Allergy  Unknown Verified 03/15/17 18:15


 


pregabalin [From Lyrica] Allergy  Unknown Verified 03/15/17 18:15


 


Sulfa (Sulfonamide Allergy  Unknown Verified 03/15/17 18:15





Antibiotics)     














Physical Exam


Vitals: 


 Vital Signs











  Temp Pulse Resp BP Pulse Ox


 


 03/16/17 09:27  98.3 F  74  28 H  143/76  98


 


 03/16/17 08:57  98.3 F  79  20  166/84  98


 


 03/16/17 08:47  98.4 F  81  18  144/79  96


 


 03/16/17 08:31   79   


 


 03/16/17 08:11   81   


 


 03/16/17 08:00  98.4 F  72  29 H  144/76  100


 


 03/16/17 07:00   70  20  150/85  98


 


 03/16/17 06:00   69  19  152/73  98


 


 03/16/17 05:00   88  22  143/60  100


 


 03/16/17 04:00  98.6 F  82  12  148/68  99


 


 03/16/17 03:00   73  17  117/65  99


 


 03/16/17 02:00   71  14  137/61  95


 


 03/16/17 01:00   74  15  103/53  98


 


 03/16/17 00:27  98.9 F  88  20  140/67 


 


 03/16/17 00:00  99.9 F H  81  17  147/62  100


 


 03/15/17 23:00   98  69 H  140/81  100


 


 03/15/17 21:36  98.9 F  104 H  22  139/96 


 


 03/15/17 21:16  97.8 F  98  22  145/79 


 


 03/15/17 21:06  98.0 F  105 H  22  129/72  100








 Intake and Output











 03/15/17 03/16/17 03/16/17





 22:59 06:59 14:59


 


Intake Total 0 370 340


 


Output Total  435 640


 


Balance 0 -65 -300


 


Intake:   


 


  IV  60 40


 


    .9 KVO  60 40


 


  Blood Product 0 310 300


 


    Rc As-1  Unit   0





    [id number]   


 


    Rc As-1  Unit 0 310 





    [id number]   


 


Output:   


 


  Urine  435 640


 


Other:   


 


  Voiding Method  Indwelling Catheter Indwelling Catheter


 


  Weight 69.2 kg 69.2 kg 











General appearance: The patient is alert, oriented, in no acute distress.


HET: Head is normocephalic and atraumatic.  Pupils are equal and reactive.  

Oropharynx is clear without lesions.


Neck: Supple without lymphadenopathy.  Trachea midline.


Heart: S1 S2. 


Lungs: No crackles or wheezes are heard.His bilaterally.


Abdomen: Soft, nontender, nondistended with  bowel sounds.  No peritoneal 

signs.  No palpable organomegaly or masses.


Extremities: Normal skin color and turgor.  No cyanosis, rash, ulceration, 

clubbing, or edema.  Radial and pedal pulses are 2/4 bilaterally.


Neurological: No focal deficits.  Strength and sensation are grossly intact.


Rectal: Brown stool on finger.  No masses palpated.








Results


CBC & Chem 7: 


 03/16/17 06:25





 03/16/17 06:25


Labs: 


 Abnormal Lab Results - Last 24 Hours (Table)











  03/15/17 03/16/17 03/16/17 Range/Units





  22:00 01:15 06:25 


 


RBC     (3.80-5.40)  m/uL


 


Hgb     (11.4-16.0)  gm/dL


 


Hct     (34.0-46.0)  %


 


MCHC     (31.0-37.0)  g/dL


 


RDW     (11.5-15.5)  %


 


Lymphocytes # (Manual)     (1.0-4.8)  k/uL


 


Glucose    135 H  (74-99)  mg/dL


 


POC Glucose (mg/dL)  126 H    (75-99)  mg/dL


 


Calcium    8.2 L  (8.4-10.2)  mg/dL


 


Troponin I   0.738 H*   (0.000-0.034)  ng/mL


 


Total Protein    5.5 L  (6.3-8.2)  g/dL


 


Albumin    3.1 L  (3.5-5.0)  g/dL














  03/16/17 03/16/17 Range/Units





  06:25 06:25 


 


RBC   2.61 L  (3.80-5.40)  m/uL


 


Hgb   6.7 L*  (11.4-16.0)  gm/dL


 


Hct   22.3 L  (34.0-46.0)  %


 


MCHC   29.3 L  (31.0-37.0)  g/dL


 


RDW   16.8 H  (11.5-15.5)  %


 


Lymphocytes # (Manual)   0.5 L  (1.0-4.8)  k/uL


 


Glucose    (74-99)  mg/dL


 


POC Glucose (mg/dL)    (75-99)  mg/dL


 


Calcium    (8.4-10.2)  mg/dL


 


Troponin I  0.710 H*   (0.000-0.034)  ng/mL


 


Total Protein    (6.3-8.2)  g/dL


 


Albumin    (3.5-5.0)  g/dL














Assessment and Plan


(1) Acute GI bleeding


Narrative/Plan: 


EGD February 2017 reported no evidence of peptic ulcer disease.  EGD/

colonoscopy July 2016 reported no evidence of peptic ulcer disease with sigmoid 

diverticulosis with polypectomy.  Etiology of anemia could be slow occult GI 

blood loss from small bowel pathology exacerbated by antiplatelet therapy.


Status: Acute   





(2) Stool guaiac positive


Status: Acute   





(3) Acute blood loss anemia


Status: Acute   





(4) COPD (chronic obstructive pulmonary disease)


Status: Acute   





(5) Oxygen dependent


Status: Acute   





(6) Elevated troponin


Status: Acute   





(7) Sigmoid diverticulosis


Status: Chronic   


Plan: 


1.  Liquid diet; nothing by mouth after midnight for small bowel capsule 

endoscopy evaluation tomorrow morning.


2.  Iron indices.


3.  GI prophylaxis Protonix 40 mg IV daily.


4.  Monitor CBC with transfusion as necessary.


5.  EGD colonoscopy not planned at this time.





Thank you for this kind referral and the opportunity to participate in the care 

of your patient.  This consultation was discussed with Dr. Miller.  The 

impression and plan of care have been directed as dictated.

## 2017-03-16 NOTE — P.HPIM
History of Present Illness


H&P Date: 17


Chief Complaint: Acute diastolic heart failure/GI bleed





This is a 77-year-old  female one of my patient with a previous 

medical history significant for CAD post-PCI of the LAD, hypertension and 

hypertensive cardio vascular disease with left ventricular hypertrophy, 

hyperlipidemia, history of the for arterial occlusive disease, carotid artery 

disease status post right carotid endarterectomy with about 70% stenosis of 

bilateral carotid arteries, and her the cardiovascular surgery, cardiology, 

pulmonary medicine, and gastroenterology and has been recently following by Dr. Dangelo from neurology for epidural injection and was taken off her Xarelto for 

48 hours prior to that, patient apparently was brought into the emergency 

department at [facility] yesterday via EMS after her granddaughter 

called EMS because of increased shortness of breath as well as not feeling well 

generalized weakness for the past few days, she was found to have a hemoglobin 

of 6.5, and she was in acute diastolic heart failure for which she was placed 

on BiPAP in the ER she was given Lasix and he belies treatment she felt a lot 

better however because of her complex medical issues she was admitted to the 

intensive care unit, Dr. ONDINA Bolton was consulted along with cardiology since her 

cardiac enzymes were elevated and gastroenterology because of her acute on 

chronic anemia, her guaiac is positive patient underwent EGD and colonoscopy 

last one in 2017 2 weeks ago at [facility] and her last EGD and 

colonoscopy to that was by Dr. guajardo in 2016 where she was found to have hiatal 

hernia as well as colon polyps and diverticulosis.  She was recently 

hospitalized at [facility] weeks ago because of left frontal ischemic 

CVA and she has been on Eliquis for quite sometimes now however because of 

insurance purposes she was switched to Xarelto recently.





Review of Systems


Constitutional: Reports chronic pain, Reports fatigue, Reports lethargy, 

Reports malaise, Reports night sweats, Reports weakness, Reports weight loss, 

Denies anorexia, Denies chronic headaches


Eyes: bilateral blurred vision, denies bulging eye, denies decreased vision


Ears: bilateral: decreased hearing


Ears, nose, mouth and throat: Reports vertigo, Denies dysphagia, Denies nose 

pain, Denies swelling in throat, Denies sore throat


Cardiovascular: Reports decreased exercise tolerance, Reports dyspnea on 

exertion, Reports high blood pressure, Reports irregular heart beat, Reports 

rapid heart beat, Reports shortness of breath, Denies chest pain, Denies syncope


Respiratory: Reports congestion, Reports cough with sputum, Reports dyspnea, 

Reports home oxygen, Reports wheezing, Denies sleep apnea, Denies snoring


Gastrointestinal: Reports change in bowel habits, Reports constipation, Denies 

abdominal pain, Denies belching, Denies bloating, Denies BRBPR, Denies 

hematemesis, Denies hematochezia, Denies melena, Denies nausea, Denies vomiting


Genitourinary: Reports urge incontinence, Denies dysuria, Denies hematuria


Menstruation: Reports postmenopausal


Musculoskeletal: Reports gait dysfunction, Reports low back pain, Denies 

myalgias


Musculoskeletal: absent: ankle pain, ankle stiffness, ankle swelling, elbow pain

, elbow stiffness, elbow swelling, foot pain, foot stiffness, foot swelling, 

hand pain, hand stiffness, hand swelling, hip pain, hip stiffness, hip swelling

, knee pain, knee stiffness, knee swelling, shoulder pain, shoulder stiffness, 

shoulder swelling, wrist pain, wrist stiffness, wrist swelling


Integumentary: Denies pruritus, Denies rash


Neurological: Reports balance difficulties, Reports gait dysfunction, Reports 

memory loss, Denies numbness, Denies weakness


Psychiatric: Reports anxiety, Reports depression, Reports sleep disturbances, 

Denies paranoia, Denies sadness/tearfulness, Denies suicidal ideation


Endocrine: Denies fatigue, Denies weight change





Past Medical History


Past Medical History: Atrial Fibrillation, Asthma, Coronary Artery Disease (CAD)

, COPD, CVA/TIA, GERD/Reflux, GI Bleed, Hyperlipidemia, Hypertension, Mitral 

Valve Prolapse (MVP), Musculoskeletal Disorder, Osteoarthritis (OA), 

Respiratory Disorder, Vascular Disorder


Additional Past Medical History / Comment(s): diverticulitits


Last Myocardial Infarction Date:: unknown


History of Any Multi-Drug Resistant Organisms: None Reported


Past Surgical History: Cholecystectomy, Heart Catheterization With Stent, 

Hysterectomy, Orthopedic Surgery


Additional Past Surgical History / Comment(s): carotid


Past Anesthesia/Blood Transfusion Reactions: No Reported Reaction


Date of Last Stent Placement:: 


Past Psychological History: No Psychological Hx Reported


Smoking Status: Current every day smoker (Patient used to smoke about 2packs 

every day she smoked over life and she quit in 2016.)


Past Alcohol Use History: None Reported


Past Drug Use History: None Reported





- Past Family History


  ** Mother


Family Medical History: Congestive Heart Failure (CHF) (Mother  from 

congestive heart failure.)





  ** Father


Family Medical History: Cancer (Father  at age 73 from lung cancer.)


Additional Family Medical History / Comment(s): Bone ca





  ** Sister(s)


Family Medical History: Coronary Artery Disease (CAD) (Patient has 3 sisters 

one of them was CAD.)





  ** Brother(s)


Family Medical History: Coronary Artery Disease (CAD) (Patient has one brother 

with CAD post MI.  And renal failure.), Myocardial Infarction (MI)





  ** Son(s)


Family Medical History: No Reported History (Patient has one son no major 

medical problems.)





  ** Daughter(s)


Family Medical History: No Reported History (Patient has one daughter no major 

problems.)





Medications and Allergies


 Home Medications











 Medication  Instructions  Recorded  Confirmed  Type


 


Montelukast Sodium [Singulair] 10 mg PO HS 09/20/14 03/15/17 History


 


ALPRAZolam [Xanax] 0.5 mg PO DAILY 12/05/15 03/15/17 History


 


Apixaban [Eliquis] 2.5 mg PO BID 12/05/15 03/15/17 History


 


Aspirin 81 mg PO DAILY 12/05/15 03/15/17 History


 


Metoprolol Tartrate [Lopressor] 25 mg PO BID 12/05/15 03/15/17 History


 


Nitroglycerin Sl Tabs [Nitrostat] 0.4 mg SUBLINGUAL Q5M PRN 12/05/15 03/15/17 

History


 


Atorvastatin [Lipitor] 40 mg PO HS 12/06/15 03/15/17 History


 


Ergocalciferol [Vitamin D2] 50,000 unit PO Q7D 03/15/17 03/15/17 History


 


Ipratropium-Albuterol Nebulize 3 ml INHALATION RT-QID PRN 03/15/17 03/15/17 

History





[Duoneb 0.5 mg-3 mg/3 ml Soln]    


 


Melatonin 3 mg PO HS 03/15/17 03/15/17 History


 


Multivitamins, Thera [Multivitamin] 1 tab PO DAILY 03/15/17 03/15/17 History


 


Omeprazole 40 mg PO DAILY 03/15/17 03/15/17 History


 


amLODIPine [Norvasc] 10 mg PO DAILY 03/15/17 03/15/17 History


 


hydrALAZINE HCL [Apresoline] 75 mg PO Q8H 03/15/17 03/15/17 History


 


oxyCODONE HCL 20 mg PO TID 03/15/17 03/15/17 History











 Allergies











Allergy/AdvReac Type Severity Reaction Status Date / Time


 


furosemide [From Lasix] Allergy Intermediate Confusion Verified 17 09:14


 


latex Allergy  Rash/Hives Verified 03/15/17 18:15


 


morphine Allergy  Unknown Verified 03/15/17 18:15


 


pregabalin [From Lyrica] Allergy  Unknown Verified 03/15/17 18:15


 


Sulfa (Sulfonamide Allergy  Unknown Verified 03/15/17 18:15





Antibiotics)     














Physical Exam


Vitals: 


 Vital Signs











  Temp Pulse Resp BP Pulse Ox


 


 17 12:00    28 H  


 


 17 11:48   70   


 


 17 11:32   64   


 


 17 09:27  98.3 F  74  28 H  143/76  98


 


 17 08:57  98.3 F  79  20  166/84  98


 


 17 08:47  98.4 F  81  18  144/79  96


 


 17 08:31   79   


 


 17 08:11   81   


 


 17 08:00  98.4 F  72  29 H  144/76  100


 


 17 07:00   70  20  150/85  98


 


 17 06:00   69  19  152/73  98


 


 17 05:00   88  22  143/60  100


 


 17 04:00  98.6 F  82  12  148/68  99


 


 17 03:00   73  17  117/65  99


 


 17 02:00   71  14  137/61  95


 


 17 01:00   74  15  103/53  98


 


 17 00:27  98.9 F  88  20  140/67 


 


 17 00:00  99.9 F H  81  17  147/62  100


 


 03/15/17 23:00   98  69 H  140/81  100


 


 03/15/17 21:36  98.9 F  104 H  22  139/96 


 


 03/15/17 21:16  97.8 F  98  22  145/79 


 


 03/15/17 21:06  98.0 F  105 H  22  129/72  100








 Intake and Output











 03/15/17 03/16/17 03/16/17





 22:59 06:59 14:59


 


Intake Total 0 370 680


 


Output Total  435 1090


 


Balance 0 -65 -410


 


Intake:   


 


  IV  60 70


 


    .9 KVO  60 70


 


  Blood Product 0 310 610


 


    Rc As-1  Unit   310





    [id number]   


 


    Rc As-1  Unit 0 310 





    [id number]   


 


Output:   


 


  Urine  435 1090


 


Other:   


 


  Voiding Method  Indwelling Catheter Indwelling Catheter


 


  Weight 69.2 kg 69.2 kg 














- Constitutional


General appearance: mild distress





- EENT


Eyes: anicteric sclerae, PERRLA, no ptosis, no scleral icterus, normal 

appearance


ENT: hard of hearing, NA/AT, normal oropharynx, no thrush





- Neck


Neck: no lymphadenopathy, normal ROM, no rigidity, no stridor, no thyromegaly


Carotids: bilateral: upstroke delayed, bruit present


Thyroid: bilateral: normal size





- Respiratory


Respiratory: bilateral: diminished, prolonged expiration, negative: dullness, 

rales, rhonchi, wheezing





- Cardiovascular


Rhythm: regular


Heart sounds: normal: S1, S2


Abnormal Heart Sounds: systolic murmur, no rub, no S3 Gallop, no S4 Gallop, no 

click





- Gastrointestinal


General gastrointestinal: normal bowel sounds, soft, no splenomegaly, no 

tenderness, no umbilical hernia, no ventral hernia





- Integumentary


Integumentary: normal, normal turgor





- Neurologic


Neurologic: CNII-XII intact





- Musculoskeletal


Musculoskeletal: gait normal, generalized weakness, strength equal bilaterally





- Psychiatric


Psychiatric: A&O x's 3, appropriate affect, intact judgment & insight





Results


CBC & Chem 7: 


 17 05:14





 17 05:14


Labs: 


 Abnormal Lab Results - Last 24 Hours (Table)











  03/15/17 03/16/17 03/16/17 Range/Units





  22:00 01:15 06:25 


 


RBC     (3.80-5.40)  m/uL


 


Hgb     (11.4-16.0)  gm/dL


 


Hct     (34.0-46.0)  %


 


MCHC     (31.0-37.0)  g/dL


 


RDW     (11.5-15.5)  %


 


Lymphocytes # (Manual)     (1.0-4.8)  k/uL


 


Glucose    135 H  (74-99)  mg/dL


 


POC Glucose (mg/dL)  126 H    (75-99)  mg/dL


 


Calcium    8.2 L  (8.4-10.2)  mg/dL


 


Troponin I   0.738 H*   (0.000-0.034)  ng/mL


 


Total Protein    5.5 L  (6.3-8.2)  g/dL


 


Albumin    3.1 L  (3.5-5.0)  g/dL














  17 Range/Units





  06:25 06:25 


 


RBC   2.61 L  (3.80-5.40)  m/uL


 


Hgb   6.7 L*  (11.4-16.0)  gm/dL


 


Hct   22.3 L  (34.0-46.0)  %


 


MCHC   29.3 L  (31.0-37.0)  g/dL


 


RDW   16.8 H  (11.5-15.5)  %


 


Lymphocytes # (Manual)   0.5 L  (1.0-4.8)  k/uL


 


Glucose    (74-99)  mg/dL


 


POC Glucose (mg/dL)    (75-99)  mg/dL


 


Calcium    (8.4-10.2)  mg/dL


 


Troponin I  0.710 H*   (0.000-0.034)  ng/mL


 


Total Protein    (6.3-8.2)  g/dL


 


Albumin    (3.5-5.0)  g/dL














Thrombosis Risk Factor Assmnt





- DVT/VTE Prophylaxis


DVT/VTE Prophylaxis: Mechanical Prophylaxis ordered, Contraindicated - See note





- Choose All That Apply


Each Factor Represents 1 point: Abnormal pulmonary function (COPD)


Each Risk Factor Represents 2 Points: Age 61-74 years


Other congenital or acquired thrombophilia - If yes, enter type in comment: No


Thrombosis Risk Factor Assessment Total Risk Factor Score: 3


Thrombosis Risk Factor Assessment Level: Moderate Risk





Assessment and Plan


Plan: 





Assessment and plan:





1.  Acute diastolic heart failure.  Continue patient on metoprolol 25 mg orally 

twice every day, Bumex 1 mg IV push every 12 hours, hydralazine 75 mg orally 3 

times every day, losartan 50 mg orally once every day, monitor input and output 

and daily weight, oxygen support to and a half liters nasal cannula, 

echocardiogram was done about 2 weeks ago .





2.  Acute on chronic GI bleed.  Likely related to small bowel pathology patient 

did have an EGD about 2 weeks ago as well as EGD and colonoscopy about a year 

ago and she is scheduled to go for Endoscopy for Tomorrow Morning.





3.  Acute blood loss anemia.  Type and cross and transfuse 2 units of packed 

red blood cells.





4.  CAD post-PCI of the LAD.  Continue metoprolol 25 mg orally twice every day, 

Lipitor 40 mg orally once every day, losartan 50 mg orally once every day, 

patient was taken off her aspirin and Eliquis for now because of GI bleed.





5.  History of CVA of the left frontal lobe.  Patient was supposed to be on 

Eliquis for life.





6.  Carotid artery disease as well as PAD.  Currently under the care of 

vascular surgery continue Lipitor 40 mg orally once every day for second or 

prevention patient cannot go on anticoagulation until after GI workup.





7.  UTI with sepsis.  Continue Rocephin 1 g every 24 hours, continue to monitor 

for blood culture and urine culture.





8.  Hypertension and hypertensive cardio vascular disease.  Continue losartan 

50 mg orally once every day, metoprolol 25 mg orally twice every day, 

hydralazine 75 mg orally 2 times every day.





9.  Hyperlipidemia.  Continue Lipitor 40 mg orally once every day.





10.  ALLERGIC rhinitis.  Continue singular 10 mg at bedtime.





11.  Lumbar degenerative disc disease with spondylolisthesis and facet 

arthropathy.  Post epidural injection, patient is to be maintained on oxycodone 

20 mg orally 3 times every day.





12. PAF.will continue with Eliquis for life.





13.  DVT prophylaxis.  Patient will be taken off Eliquis for now she will have 

knee-high KEVIN hose as well as SCD's.





14.  GI prophylaxis.  Continue Protonix 40 mg IV push every 24 hours.





15.  Patient is full code.





16.  Admit to inpatient.  Estimate length of stay 2 midnights.





17. COPD.  Patient has quit in 2016.  





18.  Prognosis is guarded.

## 2017-03-16 NOTE — CONS
DATE OF CONSULTATION:  



Mr. Davis is a 71-year-old female who is seen for cardiac 

evaluation. The patient's medical record and EMR reviewed. Patient 

came to the emergency room with the complaint of increasing shortness 

of breath for about 2 days. Patient came to the emergency room, she 

was found to have a low hemoglobin. This patient was recently in the 

[facility] where she had a low hemoglobin. She did undergo upper 

GI endoscopy. No significant abnormality was found.  Patient has been 

taking Eliquis for the stroke. Patient had a stroke in 2015.  



Past medical history includes history of hypertension, stroke, COPD.  

Patient exactly does not know whether she had an episode of atrial 

fibrillation in the past.  Past medical history includes previous 

cardiac catheterization and stent, mitral valve prolapse, past history 

of stroke and history of carotid endarterectomy and cholecystectomy.  



Patient's home medications include Apresoline 75 mg 3 times a day, 

Norvasc once a day, Lopressor 25 mg b.i.d., Cozaar 50 mg daily, 

Lipitor 40 mg daily, Eliquis 2.5 mg b.i.d.  



Physical examination at present reveals a 71-year-old female who does 

not appear to be in any acute distress. Her respirations are not 

labored.  

Blood pressure is 143/76 mmHg, heart rate is 74 per minute. 

HEENT examination is negative. Neck is supple. There is no increase in 

jugular venous pressure. Both the carotid pulses are felt. There is no 

bruit. Chest is symmetrical.  

HEART: The PMI is not felt. First and second heart sounds are normal. 

There is no evidence of any murmur. Lungs are clinically clear to 

auscultation and percussion. Abdomen is negative.  

EXTREMITIES: Peripheral pulsations are 2+. Patient's laboratory tests 

show initial hemoglobin was 6.7. Electrolytes are normal.  Patient's 

troponins are 0.089 and 0.710. EKG shows normal sinus rhythm with 

diffuse ST-segment depression noted, suggestive of subendocardial 

ischemia.  



FINAL IMPRESSION: 

1. This patient is admitted with shortness of breath. Patient's 

hemoglobin is low, and that is most likely the cause of her shortness 

of breath. Chest x-ray suggestive of congestive cardiac failure.  

2. Mild elevation in the troponin with some ST segment changes, which 

could be secondary to ischemia versus strain. This could be suggestive 

of type 2 myocardial infarction because of the supply and demand 

mismatch. 

3. Congestive heart failure. 

4. History of hypertension. 



RECOMMENDATIONS: At present, I would recommend to treat the patient 

with diuretics, echo and Doppler study will be obtained. We will hold 

of the Eliquis because of the low hemoglobin and recent GI bleed and 

we will discuss with Dr. Mccord whether there was any definite 

documentation of the prior history of atrial fibrillation.

## 2017-03-17 LAB
ANION GAP SERPL CALC-SCNC: 9 MMOL/L
BUN SERPL-SCNC: 19 MG/DL (ref 7–17)
CALCIUM SPEC-MCNC: 8.4 MG/DL (ref 8.4–10.2)
CELLS COUNTED: 100
CH: 26.8
CHCM: 31.3
CHLORIDE SERPL-SCNC: 100 MMOL/L (ref 98–107)
CO2 SERPL-SCNC: 27 MMOL/L (ref 22–30)
ERYTHROCYTE [DISTWIDTH] IN BLOOD BY AUTOMATED COUNT: 2.95 M/UL (ref 3.8–5.4)
ERYTHROCYTE [DISTWIDTH] IN BLOOD: 17.5 % (ref 11.5–15.5)
GLUCOSE BLD-MCNC: 103 MG/DL (ref 75–99)
GLUCOSE BLD-MCNC: 278 MG/DL (ref 75–99)
GLUCOSE SERPL-MCNC: 132 MG/DL (ref 74–99)
HCT VFR BLD AUTO: 25.2 % (ref 34–46)
HDW: 4.77
HEMOGLOBIN A1C: 5.4 % (ref 4.2–6.1)
HGB BLD-MCNC: 7.6 GM/DL (ref 11.4–16)
MAGNESIUM SPEC-SCNC: 2.3 MG/DL (ref 1.6–2.3)
MCH RBC QN AUTO: 25.9 PG (ref 25–35)
MCHC RBC AUTO-ENTMCNC: 30.4 G/DL (ref 31–37)
MCV RBC AUTO: 85.2 FL (ref 80–100)
NON-AFRICAN AMERICAN GFR(MDRD): 55
PHOSPHATE SERPL-MCNC: 4 MG/DL (ref 2.5–4.5)
POTASSIUM SERPL-SCNC: 4 MMOL/L (ref 3.5–5.1)
SODIUM SERPL-SCNC: 136 MMOL/L (ref 137–145)
WBC # BLD AUTO: 8.4 K/UL (ref 3.8–10.6)
WBC (PEROX): 9.32

## 2017-03-17 PROCEDURE — 0DJ07ZZ INSPECTION OF UPPER INTESTINAL TRACT, VIA NATURAL OR ARTIFICIAL OPENING: ICD-10-PCS

## 2017-03-17 RX ADMIN — HYDROMORPHONE HYDROCHLORIDE PRN MG: 1 INJECTION, SOLUTION INTRAMUSCULAR; INTRAVENOUS; SUBCUTANEOUS at 08:54

## 2017-03-17 RX ADMIN — BUMETANIDE SCH MG: 0.25 INJECTION, SOLUTION INTRAMUSCULAR; INTRAVENOUS at 21:59

## 2017-03-17 RX ADMIN — METHYLPREDNISOLONE SODIUM SUCCINATE SCH MG: 125 INJECTION, POWDER, FOR SOLUTION INTRAMUSCULAR; INTRAVENOUS at 07:47

## 2017-03-17 RX ADMIN — METOPROLOL TARTRATE SCH MG: 25 TABLET, FILM COATED ORAL at 21:59

## 2017-03-17 RX ADMIN — THERA TABS SCH EACH: TAB at 12:40

## 2017-03-17 RX ADMIN — PANTOPRAZOLE SODIUM SCH MG: 40 INJECTION, POWDER, FOR SOLUTION INTRAVENOUS at 08:58

## 2017-03-17 RX ADMIN — LOSARTAN POTASSIUM SCH MG: 50 TABLET, FILM COATED ORAL at 12:40

## 2017-03-17 RX ADMIN — SODIUM CHLORIDE, PRESERVATIVE FREE SCH: 5 INJECTION INTRAVENOUS at 22:02

## 2017-03-17 RX ADMIN — MONTELUKAST SODIUM SCH MG: 10 TABLET, FILM COATED ORAL at 21:59

## 2017-03-17 RX ADMIN — METHYLPREDNISOLONE SODIUM SUCCINATE SCH MG: 125 INJECTION, POWDER, FOR SOLUTION INTRAMUSCULAR; INTRAVENOUS at 23:54

## 2017-03-17 RX ADMIN — IPRATROPIUM BROMIDE AND ALBUTEROL SULFATE SCH: .5; 3 SOLUTION RESPIRATORY (INHALATION) at 16:27

## 2017-03-17 RX ADMIN — IPRATROPIUM BROMIDE AND ALBUTEROL SULFATE SCH ML: .5; 3 SOLUTION RESPIRATORY (INHALATION) at 20:06

## 2017-03-17 RX ADMIN — METHYLPREDNISOLONE SODIUM SUCCINATE SCH MG: 125 INJECTION, POWDER, FOR SOLUTION INTRAMUSCULAR; INTRAVENOUS at 16:57

## 2017-03-17 RX ADMIN — IPRATROPIUM BROMIDE AND ALBUTEROL SULFATE SCH ML: .5; 3 SOLUTION RESPIRATORY (INHALATION) at 07:50

## 2017-03-17 RX ADMIN — INSULIN LISPRO SCH: 100 INJECTION, SOLUTION INTRAVENOUS; SUBCUTANEOUS at 22:02

## 2017-03-17 RX ADMIN — BUDESONIDE SCH MG: 0.5 INHALANT ORAL at 07:50

## 2017-03-17 RX ADMIN — BUMETANIDE SCH MG: 0.25 INJECTION, SOLUTION INTRAMUSCULAR; INTRAVENOUS at 08:58

## 2017-03-17 RX ADMIN — INSULIN LISPRO SCH UNIT: 100 INJECTION, SOLUTION INTRAVENOUS; SUBCUTANEOUS at 16:59

## 2017-03-17 RX ADMIN — BUDESONIDE SCH MG: 0.5 INHALANT ORAL at 20:06

## 2017-03-17 RX ADMIN — ATORVASTATIN CALCIUM SCH MG: 40 TABLET, FILM COATED ORAL at 21:59

## 2017-03-17 RX ADMIN — METOPROLOL TARTRATE SCH MG: 25 TABLET, FILM COATED ORAL at 12:40

## 2017-03-17 RX ADMIN — SODIUM CHLORIDE, PRESERVATIVE FREE SCH ML: 5 INJECTION INTRAVENOUS at 08:57

## 2017-03-17 RX ADMIN — IPRATROPIUM BROMIDE AND ALBUTEROL SULFATE SCH ML: .5; 3 SOLUTION RESPIRATORY (INHALATION) at 12:22

## 2017-03-17 RX ADMIN — METHYLPREDNISOLONE SODIUM SUCCINATE SCH MG: 125 INJECTION, POWDER, FOR SOLUTION INTRAMUSCULAR; INTRAVENOUS at 12:40

## 2017-03-17 NOTE — P.PN
Subjective


Principal diagnosis: 





anemia


Capsule endoscopy in progress. no reports of GI bleeding. Hemoglobin 7.6. 








Objective





- Vital Signs


Vital signs: 


 Vital Signs











Temp  98.5 F   03/17/17 08:00


 


Pulse  88   03/17/17 12:34


 


Resp  16   03/17/17 12:00


 


BP  159/55   03/17/17 12:00


 


Pulse Ox  95   03/17/17 12:00








 Intake & Output











 03/16/17 03/17/17 03/17/17





 18:59 06:59 18:59


 


Intake Total 730 230 240


 


Output Total 1940 1230 855


 


Balance -1210 -1000 -615


 


Weight  72 kg 


 


Intake:   


 


   230 140


 


    .9  230 140


 


  Intake, IV Titration   100





  Amount   


 


    cefTRIAXone 1,000 mg In   100





    Sodium Chloride 0.9% 50   





    ml @ 100 mls/hr IVPB   





    Q12HR [facility] Rx#:[id number]   


 


  Blood Product 610  


 


    Rc As-1  Unit 310  





    [id number]   


 


Output:   


 


  Urine 1940 1230 855


 


Other:   


 


  Voiding Method Indwelling Catheter Indwelling Catheter 














- Exam


General appearance: The patient is alert, oriented, in no acute distress.


HET: Head is normocephalic and atraumatic.  Pupils are equal and reactive.  

Oropharynx is clear without lesions.


Neck: Supple without lymphadenopathy.  Trachea midline.


Heart: S1 S2. 


Lungs: No crackles or wheezes are heard.His bilaterally.


Abdomen: Soft, nontender, nondistended with  bowel sounds.  No peritoneal 

signs.  No palpable organomegaly or masses.


Extremities: Normal skin color and turgor.  No cyanosis, rash, ulceration, 

clubbing, or edema.  Radial and pedal pulses are 2/4 bilaterally.


Neurological: No focal deficits.  Strength and sensation are grossly intact.








- Labs


CBC & Chem 7: 


 03/17/17 05:14





 03/17/17 05:14


Labs: 


 Abnormal Lab Results - Last 24 Hours (Table)











  03/16/17 03/16/17 03/17/17 Range/Units





  06:25 12:22 05:14 


 


RBC   3.08 L  2.95 L  (3.80-5.40)  m/uL


 


Hgb   8.1 L  7.6 L  (11.4-16.0)  gm/dL


 


Hct   26.6 L  25.2 L  (34.0-46.0)  %


 


MCHC   30.4 L  30.4 L  (31.0-37.0)  g/dL


 


RDW   16.9 H  17.5 H  (11.5-15.5)  %


 


Neutrophils # (Manual)    7.9 H  (1.3-7.7)  k/uL


 


Lymphocytes # (Manual)   0.2 L  0.4 L  (1.0-4.8)  k/uL


 


Sodium     (137-145)  mmol/L


 


BUN     (7-17)  mg/dL


 


Glucose     (74-99)  mg/dL


 


Iron  13 L    ()  ug/dL


 


% Saturation  3.5 L    (20-50)  %


 


Ferritin  10 L    ()  ng/mL














  03/17/17 Range/Units





  05:14 


 


RBC   (3.80-5.40)  m/uL


 


Hgb   (11.4-16.0)  gm/dL


 


Hct   (34.0-46.0)  %


 


MCHC   (31.0-37.0)  g/dL


 


RDW   (11.5-15.5)  %


 


Neutrophils # (Manual)   (1.3-7.7)  k/uL


 


Lymphocytes # (Manual)   (1.0-4.8)  k/uL


 


Sodium  136 L  (137-145)  mmol/L


 


BUN  19 H  (7-17)  mg/dL


 


Glucose  132 H  (74-99)  mg/dL


 


Iron   ()  ug/dL


 


% Saturation   (20-50)  %


 


Ferritin   ()  ng/mL














Assessment and Plan


(1) Acute GI bleeding


Narrative/Plan: 


EGD February 2017 reported no evidence of peptic ulcer disease.  EGD/

colonoscopy July 2016 reported no evidence of peptic ulcer disease with sigmoid 

diverticulosis with polypectomy.  Etiology of anemia could be slow occult GI 

blood loss from small bowel pathology exacerbated by antiplatelet therapy.


Status: Acute   





(2) Stool guaiac positive


Status: Acute   





(3) Acute blood loss anemia


Status: Acute   





(4) COPD (chronic obstructive pulmonary disease)


Status: Acute   





(5) Oxygen dependent


Status: Acute   





(6) Elevated troponin


Status: Acute   





(7) Sigmoid diverticulosis


Status: Chronic   


Plan: 


1.  Diet as tolerated after capsule study is completed.


2. Will continue to follow. further recommendations forthcoming upon review of 

capsule study results.





assessment and plan of care discussed with Dr. Mejia

## 2017-03-17 NOTE — P.PN
Subjective





This is a 71-year-old female patient being evaluated and examined today in the 

intensive care unit.  This patient came into the emergency room on the 15th 

with complaints of a subjective high fever and shortness of breath along with 

coughing and sputum production.  Patient complains of chills and a mild 

headache at that time.  The patient was put on BiPAP in the emergency room and 

did show significant improvement with that.  The patient was also noted to have 

a decrease in hemoglobin and was found to be positive for occult blood in the 

stool.  Patient is also on Eliquis for previous stroke she had in 2015.  The 

patient was admitted to the hospital subsequently with acute hypoxic 

respiratory failure, sepsis, urinary tract infection, GI bleed, and CHF.  The 

patient received a blood transfusion of 2 units packed red blood cells.  Upon 

examination the patient is resting up in bed on 4 L of oxygen via nasal cannula 

she does utilize 2 L of oxygen at home.  She does continue to complain of a 

productive cough with yellow sputum.  The patient did not use the BiPAP machine 

last night.  She did undergo a capsule endoscopy this morning.  Currently the 

patient is hemodynamically stable.





Objective





- Vital Signs


Vital signs: 


 Vital Signs











Temp  98.5 F   03/17/17 08:00


 


Pulse  78   03/17/17 10:00


 


Resp  10 L  03/17/17 10:00


 


BP  153/81   03/17/17 10:00


 


Pulse Ox  95   03/17/17 10:00








 Intake & Output











 03/16/17 03/17/17 03/17/17





 18:59 06:59 18:59


 


Intake Total 730 230 180


 


Output Total 1940 1230 255


 


Balance -1210 -1000 -75


 


Weight  72 kg 


 


Intake:   


 


   230 80


 


    .9  230 80


 


  Intake, IV Titration   100





  Amount   


 


    cefTRIAXone 1,000 mg In   100





    Sodium Chloride 0.9% 50   





    ml @ 100 mls/hr IVPB   





    Q12HR [facility] Rx#:[id number]   


 


  Blood Product 610  


 


    Rc As-1  Unit 310  





    [id number]   


 


Output:   


 


  Urine 1940 1230 255


 


Other:   


 


  Voiding Method Indwelling Catheter Indwelling Catheter 














- Exam





GENERAL EXAM: Alert, active, comfortable in no apparent distress.


HEAD: Normocephalic.


EYES: Normal reaction of pupils, equal size.


NOSE: Clear with pink turbinates.


THROAT: No erythema or exudates.


NECK: No masses, no JVD.


CHEST: No chest wall deformity.


LUNGS: Equal air entry with faint expiratory wheeze.  Bilaterally diminished at 

the bases.


CVS: S1 and S2 normal with no audible mumurs, regular rhythm.


ABDOMEN: No hepatosplenomegaly, normal bowel sounds, no guarding or rigidity.


EXTREMITIES: No edema noted, pedal pulses palpable.


SKIN: No rashes


CENTRAL NERVOUS SYSTEM: No focal deficits, tone is normal in all 4 extremities.





- Labs


CBC & Chem 7: 


 03/17/17 05:14





 03/17/17 05:14


Labs: 


 Abnormal Lab Results - Last 24 Hours (Table)











  03/16/17 03/16/17 03/17/17 Range/Units





  06:25 12:22 05:14 


 


RBC   3.08 L  2.95 L  (3.80-5.40)  m/uL


 


Hgb   8.1 L  7.6 L  (11.4-16.0)  gm/dL


 


Hct   26.6 L  25.2 L  (34.0-46.0)  %


 


MCHC   30.4 L  30.4 L  (31.0-37.0)  g/dL


 


RDW   16.9 H  17.5 H  (11.5-15.5)  %


 


Neutrophils # (Manual)    7.9 H  (1.3-7.7)  k/uL


 


Lymphocytes # (Manual)   0.2 L  0.4 L  (1.0-4.8)  k/uL


 


Sodium     (137-145)  mmol/L


 


BUN     (7-17)  mg/dL


 


Glucose     (74-99)  mg/dL


 


Iron  13 L    ()  ug/dL


 


% Saturation  3.5 L    (20-50)  %


 


Ferritin  10 L    ()  ng/mL














  03/17/17 Range/Units





  05:14 


 


RBC   (3.80-5.40)  m/uL


 


Hgb   (11.4-16.0)  gm/dL


 


Hct   (34.0-46.0)  %


 


MCHC   (31.0-37.0)  g/dL


 


RDW   (11.5-15.5)  %


 


Neutrophils # (Manual)   (1.3-7.7)  k/uL


 


Lymphocytes # (Manual)   (1.0-4.8)  k/uL


 


Sodium  136 L  (137-145)  mmol/L


 


BUN  19 H  (7-17)  mg/dL


 


Glucose  132 H  (74-99)  mg/dL


 


Iron   ()  ug/dL


 


% Saturation   (20-50)  %


 


Ferritin   ()  ng/mL














Assessment and Plan


Plan: 





Assessment





Probable Community-acquired pneumonia


Acute exacerbation of chronic obstructive pulmonary disease.


Urinary tract infection, gram-negative bacilli


Diastolic Congestive heart failure


Bilateral small pleural effusions


Acute GI bleed


Coagulopathic on Eliquis





Plan





Medications have been reviewed and will be continued as ordered. Continue to 

hold on the Eliquis.  Hemoglobin currently stable post transfusion of 2 units 

of packed red blood cells.  Echo was completed and the patient has a EF between 

45 and 50%.  Patient is also being seen by GI, and received a capsule endoscopy 

this morning.  We will continue the IV steroids and bronchodilators, aerosol 

steroids and antibiotics.  If the patient continues to be hemodynamically stable

, and no gross bleeding present, she could be a candidate for possible 

downgrade from ICU to selective care unit.  We will continue to monitor labs, 

assess for any bleeding, and adjust treatment as necessary.  Further 

recommendations pending.





Please note that we are covering for Dr. HOLLI Bolton





I performed an examination of the patient and discussed their management with 

the nurse practitioner. I have reviewed the nurse practitioner's note and agree 

with the documented findings and plan of care.

## 2017-03-17 NOTE — PN
Patient was seen by my associate, Dr. AKBAR Bowles, yesterday. Patient 

was admitted to the hospital with increasing weakness and shortness of 

breath, noted to have hemoglobin drop down to 6.7 grams. Today it is 

7.6. Patient is known to have paroxysmal atrial fibrillation, follows 

with my associate, history of CVA in 2015, has been on (      ) 

anticoagulants, and patient apparently had some GI bleed and was 

recently in [facility]. Endoscopy did not reveal any 

bleeding, but probably we do not know the details, whether it is upper 

and lower endoscopy, whether done, or only upper endoscopy was done. ( 

     )  anticoagulant is on hold. Vital signs are stable. Patient's 

hemoglobin is still 7.6 today, better than before. Patient denies any 

symptoms at present time. Vital signs are stable. Remains in sinus 

rhythm (      ) the question of whether the patient has any atrial 

fibrillation has been documented to paroxysmal atrial fibrillation in 

the office chart by Dr. Mccord. Patient also had a stent done in the 

past with history of CVA with (      ) pulmonary hypertension. 

Patient's vital signs are stable with a pulse rate of 56 beats per 

minute and regular, blood pressure of 126/56, respirations of 12. Head 

normocephalic. HEENT unremarkable. Neck is supple. No thyroid 

enlargement. No bruit noted. Good carotid upstroke bilaterally. Chest 

is symmetrical. CARDIAC EXAMINATION: Regular rate and rhythm. S1 and 

S2. Lungs are clinically to auscultation and percussion. Abdomen is 

soft. No organomegaly. Positive bowel sounds.   



ASSESSMENT: Gastrointestinal bleed with low hemoglobin, 6.7 on 

admission, improved to 7.6 after transfusion, presenting with 

shortness of breath and weakness. Need to find out the source of GI 

bleed and correct it. Patient's normal anticoagulant is on hold. 

Paroxysmal atrial fibrillation, known patient with atherosclerotic 

heart disease and known patient with history of pulmonary hypertension 

and history of CVA.

## 2017-03-17 NOTE — P.PN
Subjective





This is a 77-year-old  female one of my patient with a previous 

medical history significant for CAD post-PCI of the LAD, hypertension and 

hypertensive cardio vascular disease with left ventricular hypertrophy, 

hyperlipidemia, history of the for arterial occlusive disease, carotid artery 

disease status post right carotid endarterectomy with about 70% stenosis of 

bilateral carotid arteries, and her the cardiovascular surgery, cardiology, 

pulmonary medicine, and gastroenterology and has been recently following by Dr. Dangelo from neurology for epidural injection and was taken off her Xarelto for 

48 hours prior to that, patient apparently was brought into the emergency 

department at [facility] yesterday via EMS after her granddaughter 

called EMS because of increased shortness of breath as well as not feeling well 

generalized weakness for the past few days, she was found to have a hemoglobin 

of 6.5, and she was in acute diastolic heart failure for which she was placed 

on BiPAP in the ER she was given Lasix and he belies treatment she felt a lot 

better however because of her complex medical issues she was admitted to the 

intensive care unit, Dr. ONDINA Bolton was consulted along with cardiology since her 

cardiac enzymes were elevated and gastroenterology because of her acute on 

chronic anemia, her guaiac is positive patient underwent EGD and colonoscopy 

last one in 2017 2 weeks ago at [facility] and her last EGD and 

colonoscopy to that was by Dr. Kessler in 2016 where she was found to have hiatal 

hernia as well as colon polyps and diverticulosis.  She was recently 

hospitalized at [facility] weeks ago because of left frontal ischemic 

CVA and she has been on Eliquis for quite sometimes now however because of 

insurance purposes she was switched to Xarelto recently.





3/17:patient is status post transfusion of 2 units of packed RBC with current 

hemoglobin of 7.6. Echocardiogram reveals moderate mitral regurgitation, mild 

tricuspid regurgitation, severe pulmonary hypertension, mild concentric left 

ventricular hypertrophy, EF 45-50%.  Cardiology is following the patient.  

Elevated troponins thought to be secondary to ischemia and type II myocardial 

infarction.  Patient has been seen by Dr. COURTNEY Miller with plan for small bowel 

capsule endoscopy, Protonix, iron studies. No bleeding noted. She is undergoing 

capsule endoscopy. Patient has been hemodynamically stable and will be 

transferred to [facility] today. 











Objective





- Vital Signs


Vital signs: 


 Vital Signs











Temp  98.5 F   03/17/17 08:00


 


Pulse  78   03/17/17 10:00


 


Resp  10 L  03/17/17 10:00


 


BP  153/81   03/17/17 10:00


 


Pulse Ox  95   03/17/17 10:00








 Intake & Output











 03/16/17 03/17/17 03/17/17





 18:59 06:59 18:59


 


Intake Total 730 230 180


 


Output Total 1940 1230 255


 


Balance -1210 -1000 -75


 


Weight  72 kg 


 


Intake:   


 


   230 80


 


    .9  230 80


 


  Intake, IV Titration   100





  Amount   


 


    cefTRIAXone 1,000 mg In   100





    Sodium Chloride 0.9% 50   





    ml @ 100 mls/hr IVPB   





    Q12HR [facility] Rx#:[id number]   


 


  Blood Product 610  


 


    Rc As-1  Unit 310  





    [id number]   


 


Output:   


 


  Urine 1940 1230 255


 


Other:   


 


  Voiding Method Indwelling Catheter Indwelling Catheter 














- Exam





General appearance: mild distress





- EENT


Eyes: anicteric sclerae, PERRLA, no ptosis, no scleral icterus, normal 

appearance


ENT: hard of hearing, NA/AT, normal oropharynx, no thrush





- Neck


Neck: no lymphadenopathy, normal ROM, no rigidity, no stridor, no thyromegaly


Carotids: bilateral: upstroke delayed, bruit present


Thyroid: bilateral: normal size





- Respiratory


Respiratory: bilateral: diminished, prolonged expiration, negative: dullness, 

rales, rhonchi, wheezing





- Cardiovascular


Rhythm: regular


Heart sounds: normal: S1, S2


Abnormal Heart Sounds: systolic murmur, no rub, no S3 Gallop, no S4 Gallop, no 

click





- Gastrointestinal


General gastrointestinal: normal bowel sounds, soft, no splenomegaly, no 

tenderness, no umbilical hernia, no ventral hernia





- Integumentary


Integumentary: normal, normal turgor





- Neurologic


Neurologic: CNII-XII intact





- Musculoskeletal


Musculoskeletal: gait normal, generalized weakness, strength equal bilaterally





- Psychiatric


Psychiatric: A&O x's 3, appropriate affect, intact judgment & insight





- Labs


CBC & Chem 7: 


 03/17/17 05:14





 03/17/17 05:14


Labs: 


 Abnormal Lab Results - Last 24 Hours (Table)











  03/16/17 03/16/17 03/17/17 Range/Units





  06:25 12:22 05:14 


 


RBC   3.08 L  2.95 L  (3.80-5.40)  m/uL


 


Hgb   8.1 L  7.6 L  (11.4-16.0)  gm/dL


 


Hct   26.6 L  25.2 L  (34.0-46.0)  %


 


MCHC   30.4 L  30.4 L  (31.0-37.0)  g/dL


 


RDW   16.9 H  17.5 H  (11.5-15.5)  %


 


Neutrophils # (Manual)    7.9 H  (1.3-7.7)  k/uL


 


Lymphocytes # (Manual)   0.2 L  0.4 L  (1.0-4.8)  k/uL


 


Sodium     (137-145)  mmol/L


 


BUN     (7-17)  mg/dL


 


Glucose     (74-99)  mg/dL


 


Iron  13 L    ()  ug/dL


 


% Saturation  3.5 L    (20-50)  %


 


Ferritin  10 L    ()  ng/mL














  03/17/17 Range/Units





  05:14 


 


RBC   (3.80-5.40)  m/uL


 


Hgb   (11.4-16.0)  gm/dL


 


Hct   (34.0-46.0)  %


 


MCHC   (31.0-37.0)  g/dL


 


RDW   (11.5-15.5)  %


 


Neutrophils # (Manual)   (1.3-7.7)  k/uL


 


Lymphocytes # (Manual)   (1.0-4.8)  k/uL


 


Sodium  136 L  (137-145)  mmol/L


 


BUN  19 H  (7-17)  mg/dL


 


Glucose  132 H  (74-99)  mg/dL


 


Iron   ()  ug/dL


 


% Saturation   (20-50)  %


 


Ferritin   ()  ng/mL














Assessment and Plan


Plan: 





1.  Acute diastolic heart failure.  Continue patient on metoprolol 25 mg orally 

twice every day, Bumex 1 mg IV push every 12 hours, hydralazine 75 mg orally 3 

times every day, losartan 50 mg orally once every day, monitor input and output 

and daily weight, oxygen support to and a half liters nasal cannula, 

echocardiogram was done about 2 weeks ago .





2.  Acute on chronic GI bleed.  Likely related to small bowel pathology patient 

did have an EGD about 2 weeks ago as well as EGD and colonoscopy about a year 

ago and she is scheduled to go for Endoscopy for Tomorrow Morning.





3.  Acute blood loss anemia.  Type and cross and transfuse 2 units of packed 

red blood cells.





4.  Paroxysmal Atrial fibrillation. has been on Xarelto/eliquis - on hold and 

lopressor.





5.  CAD post-PCI of the LAD.  Continue metoprolol 25 mg orally twice every day, 

Lipitor 40 mg orally once every day, losartan 50 mg orally once every day, 

patient was taken off her aspirin and Eliquis for now because of GI bleed.





6.  History of CVA of the left frontal lobe.  Patient was supposed to be on 

Eliquis for life.





7.  Carotid artery disease as well as PAD.  Currently under the care of 

vascular surgery continue Lipitor 40 mg orally once every day for second or 

prevention patient cannot go on anticoagulation until after GI workup.





8.  UTI with sepsis.  Continue Rocephin 1 g every 24 hours, continue to monitor 

for blood culture and urine culture.





9.  Hypertension and hypertensive cardio vascular disease.  Continue losartan 

50 mg orally once every day, metoprolol 25 mg orally twice every day, 

hydralazine 75 mg orally 2 times every day.





10.  Hyperlipidemia.  Continue Lipitor 40 mg orally once every day.





11.  ALLERGIC rhinitis.  Continue singular 10 mg at bedtime.





12.  Lumbar degenerative disc disease with spondylolisthesis and facet 

arthropathy.  Post epidural injection, patient is to be maintained on oxycodone 

20 mg orally 3 times every day.





13.  DVT prophylaxis.  Patient will be taken off Eliquis for now she will have 

knee-high KEVIN hose as well as SCD's.





14.  GI prophylaxis.  Continue Protonix 40 mg IV push every 24 hours.





15.  Patient is full code.





Discharge plan: TBD





Impression and plan of care have been directed as dictated by the signing 

physician.  Danielle Herbert nurse practitioner acting as scribe for signing 

physician.





Time with Patient: Greater than 30

## 2017-03-17 NOTE — ECHOF
Referral Reason:n



MEASUREMENTS

--------

HEIGHT: 167.6 cm

WEIGHT: 69.0 kg

BP: 132/69

RVIDd:   2.7 cm     (< 3.3)

IVSd:   1.3 cm     (0.6 - 1.1)

LVIDd:   5.0 cm     (3.9 - 5.3)

LVPWd:   1.3 cm     (0.6 - 1.1)

IVSs:   1.8 cm

LVIDs:   3.3 cm

LVPWs:   1.7 cm

LA Diam:   3.7 cm     (2.7 - 3.8)

LAESV Index (A-L):   24.33 ml/m

Ao Diam:   2.8 cm     (2.0 - 3.7)

AV Cusp:   1.4 cm     (1.5 - 2.6)

LA Diam:   4.1 cm     (2.7 - 3.8)

MV EXCURSION:   16.226 mm     (> 18.000)

MV EF SLOPE:   101 mm/s     (70 - 150)

EPSS:   0.4 cm

MV E Jett:   1.10 m/s

MV DecT:   155 ms

MV A Jett:   0.97 m/s

MV E/A Ratio:   1.13 

RAP:   15.00 mmHg

RVSP:   58.16 mmHg







FINDINGS

--------

Sinus rhythm.

This was a technically adequate study.

The left ventricular size is normal.   There is mild 

concentric left ventricular hypertrophy.   Overall left 

ventricular systolic function is mildly impaired with, 

an EF between 45 - 50 %.   Inferior Hypokinesis

The right ventricle is normal in size and function.

Normal LA  size by volume 22+/-6 ml/m2.

The right atrium is normal in size.

Aortic valve is trileaflet and is mildly thickened.

The mitral valve leaflets are mildly thickened.   Mild 

mitral annular calcification present.   

Mild-to-moderate mitral regurgitation is present.

Mild tricuspid regurgitation present.   There is 

moderate to severe pulmonary hypertension.   The right 

ventricular systolic pressure, as measured by Doppler, 

is 58.16mmHg.

The pulmonic valve was not well visualized.

The aortic root size is normal.

The inferior vena cava is dilated with no significant 

inspiratory collapse which is consistent estimated 

right atrial pressure of >15 mmHg.

There is no pericardial effusion.



CONCLUSIONS

--------

1. Sinus rhythm.

2. Aortic valve is trileaflet and is mildly thickened.

3. The mitral valve leaflets are mildly thickened.

4. Mild mitral annular calcification present.

5. Mild-to-moderate mitral regurgitation is present.

6. Mild tricuspid regurgitation present.

7. There is moderate to severe pulmonary hypertension.

8. The right ventricular systolic pressure, as measured by 

Doppler, is 58.16mmHg.

9. The pulmonic valve was not well visualized.

10. The aortic root size is normal.

11. The inferior vena cava is dilated with no significant 

inspiratory collapse which is consistent estimated 

right atrial pressure of >15 mmHg.

12. This was a technically adequate study.

13. There is no pericardial effusion.

14. The left ventricular size is normal.

15. There is mild concentric left ventricular hypertrophy.

16. Overall left ventricular systolic function is mildly 

impaired with, an EF between 45 - 50 %.

17. Inferior Hypokinesis

18. The right ventricle is normal in size and function.

19. Normal LA size by volume 22+/-6 ml/m2.

20. The right atrium is normal in size.





SONOGRAPHER: Isael Quintana RDCS

## 2017-03-18 LAB
ANION GAP SERPL CALC-SCNC: 9 MMOL/L
BASOPHILS # BLD AUTO: 0.2 K/UL (ref 0–0.2)
BASOPHILS NFR BLD AUTO: 2 %
BUN SERPL-SCNC: 35 MG/DL (ref 7–17)
CALCIUM SPEC-MCNC: 8.4 MG/DL (ref 8.4–10.2)
CH: 26.5
CHCM: 30.7
CHLORIDE SERPL-SCNC: 97 MMOL/L (ref 98–107)
CO2 SERPL-SCNC: 27 MMOL/L (ref 22–30)
EOSINOPHIL # BLD AUTO: 0 K/UL (ref 0–0.7)
EOSINOPHIL NFR BLD AUTO: 0 %
ERYTHROCYTE [DISTWIDTH] IN BLOOD BY AUTOMATED COUNT: 2.97 M/UL (ref 3.8–5.4)
ERYTHROCYTE [DISTWIDTH] IN BLOOD: 17.8 % (ref 11.5–15.5)
GLUCOSE BLD-MCNC: 139 MG/DL (ref 75–99)
GLUCOSE BLD-MCNC: 142 MG/DL (ref 75–99)
GLUCOSE BLD-MCNC: 148 MG/DL (ref 75–99)
GLUCOSE BLD-MCNC: 161 MG/DL (ref 75–99)
GLUCOSE SERPL-MCNC: 134 MG/DL (ref 74–99)
HCT VFR BLD AUTO: 25.5 % (ref 34–46)
HDW: 4.65
HGB BLD-MCNC: 7.7 GM/DL (ref 11.4–16)
LUC NFR BLD AUTO: 5 %
LYMPHOCYTES # SPEC AUTO: 0.2 K/UL (ref 1–4.8)
LYMPHOCYTES NFR SPEC AUTO: 2 %
MAGNESIUM SPEC-SCNC: 2.3 MG/DL (ref 1.6–2.3)
MCH RBC QN AUTO: 25.8 PG (ref 25–35)
MCHC RBC AUTO-ENTMCNC: 30.1 G/DL (ref 31–37)
MCV RBC AUTO: 85.9 FL (ref 80–100)
MONOCYTES # BLD AUTO: 6.5 K/UL (ref 0–1)
MONOCYTES NFR BLD AUTO: 58 %
NEUTROPHILS # BLD AUTO: 3.7 K/UL (ref 1.3–7.7)
NEUTROPHILS NFR BLD AUTO: 33 %
NON-AFRICAN AMERICAN GFR(MDRD): 40
PHOSPHATE SERPL-MCNC: 3.7 MG/DL (ref 2.5–4.5)
POTASSIUM SERPL-SCNC: 4.2 MMOL/L (ref 3.5–5.1)
SODIUM SERPL-SCNC: 133 MMOL/L (ref 137–145)
WBC # BLD AUTO: 0.6 10*3/UL
WBC # BLD AUTO: 11.2 K/UL (ref 3.8–10.6)
WBC (PEROX): 11.53

## 2017-03-18 RX ADMIN — PANTOPRAZOLE SODIUM SCH MG: 40 INJECTION, POWDER, FOR SOLUTION INTRAVENOUS at 07:58

## 2017-03-18 RX ADMIN — THERA TABS SCH EACH: TAB at 07:59

## 2017-03-18 RX ADMIN — BUMETANIDE SCH MG: 0.25 INJECTION, SOLUTION INTRAMUSCULAR; INTRAVENOUS at 20:43

## 2017-03-18 RX ADMIN — METHYLPREDNISOLONE SODIUM SUCCINATE SCH MG: 125 INJECTION, POWDER, FOR SOLUTION INTRAMUSCULAR; INTRAVENOUS at 17:24

## 2017-03-18 RX ADMIN — IPRATROPIUM BROMIDE AND ALBUTEROL SULFATE SCH ML: .5; 3 SOLUTION RESPIRATORY (INHALATION) at 16:33

## 2017-03-18 RX ADMIN — METHYLPREDNISOLONE SODIUM SUCCINATE SCH MG: 125 INJECTION, POWDER, FOR SOLUTION INTRAMUSCULAR; INTRAVENOUS at 23:26

## 2017-03-18 RX ADMIN — IPRATROPIUM BROMIDE AND ALBUTEROL SULFATE SCH ML: .5; 3 SOLUTION RESPIRATORY (INHALATION) at 19:58

## 2017-03-18 RX ADMIN — METHYLPREDNISOLONE SODIUM SUCCINATE SCH MG: 125 INJECTION, POWDER, FOR SOLUTION INTRAMUSCULAR; INTRAVENOUS at 06:44

## 2017-03-18 RX ADMIN — MONTELUKAST SODIUM SCH MG: 10 TABLET, FILM COATED ORAL at 20:42

## 2017-03-18 RX ADMIN — METOPROLOL TARTRATE SCH MG: 25 TABLET, FILM COATED ORAL at 20:42

## 2017-03-18 RX ADMIN — INSULIN LISPRO SCH: 100 INJECTION, SOLUTION INTRAVENOUS; SUBCUTANEOUS at 12:16

## 2017-03-18 RX ADMIN — INSULIN LISPRO SCH UNIT: 100 INJECTION, SOLUTION INTRAVENOUS; SUBCUTANEOUS at 07:16

## 2017-03-18 RX ADMIN — INSULIN LISPRO SCH UNIT: 100 INJECTION, SOLUTION INTRAVENOUS; SUBCUTANEOUS at 17:23

## 2017-03-18 RX ADMIN — BUMETANIDE SCH MG: 0.25 INJECTION, SOLUTION INTRAMUSCULAR; INTRAVENOUS at 07:59

## 2017-03-18 RX ADMIN — SODIUM CHLORIDE, PRESERVATIVE FREE SCH ML: 5 INJECTION INTRAVENOUS at 21:52

## 2017-03-18 RX ADMIN — IPRATROPIUM BROMIDE AND ALBUTEROL SULFATE SCH ML: .5; 3 SOLUTION RESPIRATORY (INHALATION) at 12:03

## 2017-03-18 RX ADMIN — BUDESONIDE SCH MG: 0.5 INHALANT ORAL at 08:14

## 2017-03-18 RX ADMIN — METOPROLOL TARTRATE SCH MG: 25 TABLET, FILM COATED ORAL at 07:59

## 2017-03-18 RX ADMIN — METHYLPREDNISOLONE SODIUM SUCCINATE SCH MG: 125 INJECTION, POWDER, FOR SOLUTION INTRAMUSCULAR; INTRAVENOUS at 06:49

## 2017-03-18 RX ADMIN — METHYLPREDNISOLONE SODIUM SUCCINATE SCH MG: 125 INJECTION, POWDER, FOR SOLUTION INTRAMUSCULAR; INTRAVENOUS at 12:19

## 2017-03-18 RX ADMIN — BUDESONIDE SCH MG: 0.5 INHALANT ORAL at 19:58

## 2017-03-18 RX ADMIN — LOSARTAN POTASSIUM SCH MG: 50 TABLET, FILM COATED ORAL at 07:59

## 2017-03-18 RX ADMIN — INSULIN LISPRO SCH UNIT: 100 INJECTION, SOLUTION INTRAVENOUS; SUBCUTANEOUS at 20:53

## 2017-03-18 RX ADMIN — IPRATROPIUM BROMIDE AND ALBUTEROL SULFATE SCH ML: .5; 3 SOLUTION RESPIRATORY (INHALATION) at 08:15

## 2017-03-18 RX ADMIN — SODIUM CHLORIDE, PRESERVATIVE FREE SCH ML: 5 INJECTION INTRAVENOUS at 08:00

## 2017-03-18 RX ADMIN — ATORVASTATIN CALCIUM SCH MG: 40 TABLET, FILM COATED ORAL at 20:42

## 2017-03-18 NOTE — PN
INTERVAL HISTORY: The patient's hemoglobin was found to be 7.7 this 

morning. The patient is alert, awake, sitting in bed. Patient is 

slightly forgetful and was not able to identify the date. The patient 

is denying chest pain, shortness breath, nausea, vomiting, abdominal 

pain, dizziness, lightheadedness, or blurry vision.  



PHYSICAL EXAMINATION: 

VITAL SIGNS: 97.5, 83, 73, 16, 117/58. Saturation is 97% on 4 L nasal 

cannula.  

LUNGS: Clear to auscultation bilaterally. 

HEART: Normal S1, S2. 

ABDOMEN: Soft, no tenderness, positive bowel sounds in all four 

quadrants.  

PSYCH: As above. 

SKIN: No new rash.



IMAGING AND LABS: Hemoglobin 7.7, white blood count 11.2, platelets 

207. Chem-7 showed sodium 133, creatinine slightly elevated at 1.32. 

Glucose is fluctuating between 103 and 142. Calcium, phosphate, 

magnesium all within normal limits. Urine culture showed E. coli 

resistant to Bactrim and Cipro.  



ASSESSMENT AND PLAN: 

1.   Gastrointestinal bleed. Negative EGD and colonoscopy in the past. 

Patient currently is tolerating diet. No obvious bowel movement with 

blood reported. Patient is receiving capsule endoscopy per GI 

recommendation. We will follow up closely on test results. Keep 

monitoring vital signs and hemoglobin in the morning.  

2.   E. coli urinary tract infection. Patient is on Rocephin, seems to 

be good coverage. We will continue current medications. Patient seems 

to be compensated.  

3.   Anxiety. Will continue Xanax as needed. 

4.   Congestive heart failure. Continue cardioprotective medication. 

Patient is still on Bumex 1 mg IV push every12 hours. Seems to be 

responding with current treatment.  

5.   Hyperglycemia. Hemoglobin A1c was found to be normal. Will 

continue insulin sliding scale.  

6.   Hypertension, under fair control. 

7.   Gastroesophageal reflux disease. Will continue Protonix. 



Discharge process based on clinical progress.

## 2017-03-18 NOTE — P.PN
Subjective


Principal diagnosis: 





Atrial fibrillation





This is a pleasant 71-year-old  female patient with a past medical 

history significant for CAD with a prior stenting, hypertension, dyslipidemia, 

and paroxysmal atrial fibrillation was admitted to the hospital with anemia.


The patient underwent a capsule study and the results still pending at this 

point.


She continues to be in A. fib with controlled heart rate on the current medical 

regimen.





I will continue the current medical treatment.  The heart rate has been under 

good control.  Continue holding on any anticoagulation until we know the 

results of the capsule study.





Objective





- Vital Signs


Vital signs: 


 Vital Signs











Temp  97.5 F L  03/18/17 08:00


 


Pulse  82   03/18/17 08:25


 


Resp  16   03/18/17 08:00


 


BP  117/58   03/18/17 08:00


 


Pulse Ox  97   03/18/17 08:00








 Intake & Output











 03/17/17 03/18/17 03/18/17





 18:59 06:59 18:59


 


Intake Total 260 260 180


 


Output Total 1105  


 


Balance -845 260 180


 


Weight  70.1 kg 70.1 kg


 


Intake:   


 


   160 


 


    .9  160 


 


  Intake, IV Titration 100 100 





  Amount   


 


    cefTRIAXone 1,000 mg In 100 100 





    Sodium Chloride 0.9% 50   





    ml @ 100 mls/hr IVPB   





    Q12HR [facility] Rx#:[id number]   


 


  Oral   180


 


Output:   


 


  Urine 1105  


 


Other:   


 


  Voiding Method Indwelling Catheter  


 


  # Bowel Movements   1














- Constitutional


General appearance: Present: no acute distress





- Respiratory


Respiratory: bilateral: CTA





- Cardiovascular


Rhythm: irregularly irregular


Heart sounds: normal: S1, S2





- Labs


CBC & Chem 7: 


 03/18/17 06:28





 03/18/17 06:28


Labs: 


 Abnormal Lab Results - Last 24 Hours (Table)











  03/17/17 03/17/17 03/18/17 Range/Units





  16:51 21:20 05:58 


 


WBC     (3.8-10.6)  k/uL


 


RBC     (3.80-5.40)  m/uL


 


Hgb     (11.4-16.0)  gm/dL


 


Hct     (34.0-46.0)  %


 


MCHC     (31.0-37.0)  g/dL


 


RDW     (11.5-15.5)  %


 


Lymphocytes #     (1.0-4.8)  k/uL


 


Monocytes #     (0-1.0)  k/uL


 


Sodium     (137-145)  mmol/L


 


Chloride     ()  mmol/L


 


BUN     (7-17)  mg/dL


 


Creatinine     (0.52-1.04)  mg/dL


 


Glucose     (74-99)  mg/dL


 


POC Glucose (mg/dL)  278 H  103 H  142 H  (75-99)  mg/dL














  03/18/17 03/18/17 Range/Units





  06:28 06:28 


 


WBC  11.2 H   (3.8-10.6)  k/uL


 


RBC  2.97 L   (3.80-5.40)  m/uL


 


Hgb  7.7 L   (11.4-16.0)  gm/dL


 


Hct  25.5 L   (34.0-46.0)  %


 


MCHC  30.1 L   (31.0-37.0)  g/dL


 


RDW  17.8 H   (11.5-15.5)  %


 


Lymphocytes #  0.2 L   (1.0-4.8)  k/uL


 


Monocytes #  6.5 H   (0-1.0)  k/uL


 


Sodium   133 L  (137-145)  mmol/L


 


Chloride   97 L  ()  mmol/L


 


BUN   35 H  (7-17)  mg/dL


 


Creatinine   1.32 H  (0.52-1.04)  mg/dL


 


Glucose   134 H  (74-99)  mg/dL


 


POC Glucose (mg/dL)    (75-99)  mg/dL














Assessment and Plan


Plan: 





Assessment


#1 A. fib with controlled heart rate


#2 anemia with unknown etiology at this point


#3 coronary artery disease


#4 multiple comorbid conditions





Plan


#1 continue the current medical treatment


#2 continue holding on anticoagulation


#3 follow-up with the patient

## 2017-03-19 LAB
ANION GAP SERPL CALC-SCNC: 10 MMOL/L
BUN SERPL-SCNC: 46 MG/DL (ref 7–17)
CALCIUM SPEC-MCNC: 8.8 MG/DL (ref 8.4–10.2)
CELLS COUNTED: 100
CH: 26.2
CHCM: 30.3
CHLORIDE SERPL-SCNC: 96 MMOL/L (ref 98–107)
CO2 SERPL-SCNC: 30 MMOL/L (ref 22–30)
ERYTHROCYTE [DISTWIDTH] IN BLOOD BY AUTOMATED COUNT: 3.13 M/UL (ref 3.8–5.4)
ERYTHROCYTE [DISTWIDTH] IN BLOOD: 17.9 % (ref 11.5–15.5)
GLUCOSE BLD-MCNC: 134 MG/DL (ref 75–99)
GLUCOSE BLD-MCNC: 145 MG/DL (ref 75–99)
GLUCOSE BLD-MCNC: 147 MG/DL (ref 75–99)
GLUCOSE BLD-MCNC: 172 MG/DL (ref 75–99)
GLUCOSE SERPL-MCNC: 131 MG/DL (ref 74–99)
HCT VFR BLD AUTO: 26.9 % (ref 34–46)
HDW: 4.41
HGB BLD-MCNC: 8.2 GM/DL (ref 11.4–16)
MAGNESIUM SPEC-SCNC: 2.3 MG/DL (ref 1.6–2.3)
MCH RBC QN AUTO: 26.2 PG (ref 25–35)
MCHC RBC AUTO-ENTMCNC: 30.4 G/DL (ref 31–37)
MCV RBC AUTO: 86 FL (ref 80–100)
NON-AFRICAN AMERICAN GFR(MDRD): 34
PHOSPHATE SERPL-MCNC: 3.6 MG/DL (ref 2.5–4.5)
POTASSIUM SERPL-SCNC: 5 MMOL/L (ref 3.5–5.1)
SODIUM SERPL-SCNC: 136 MMOL/L (ref 137–145)
WBC # BLD AUTO: 7.8 K/UL (ref 3.8–10.6)
WBC (PEROX): 8.55

## 2017-03-19 RX ADMIN — BUDESONIDE SCH MG: 0.5 INHALANT ORAL at 20:22

## 2017-03-19 RX ADMIN — PANTOPRAZOLE SODIUM SCH MG: 40 TABLET, DELAYED RELEASE ORAL at 06:39

## 2017-03-19 RX ADMIN — BUMETANIDE SCH MG: 0.25 INJECTION, SOLUTION INTRAMUSCULAR; INTRAVENOUS at 08:15

## 2017-03-19 RX ADMIN — MONTELUKAST SODIUM SCH MG: 10 TABLET, FILM COATED ORAL at 20:45

## 2017-03-19 RX ADMIN — METHYLPREDNISOLONE SODIUM SUCCINATE SCH MG: 125 INJECTION, POWDER, FOR SOLUTION INTRAMUSCULAR; INTRAVENOUS at 11:57

## 2017-03-19 RX ADMIN — INSULIN LISPRO SCH UNIT: 100 INJECTION, SOLUTION INTRAVENOUS; SUBCUTANEOUS at 17:09

## 2017-03-19 RX ADMIN — SODIUM CHLORIDE, PRESERVATIVE FREE SCH ML: 5 INJECTION INTRAVENOUS at 20:45

## 2017-03-19 RX ADMIN — IPRATROPIUM BROMIDE AND ALBUTEROL SULFATE SCH ML: .5; 3 SOLUTION RESPIRATORY (INHALATION) at 08:29

## 2017-03-19 RX ADMIN — IPRATROPIUM BROMIDE AND ALBUTEROL SULFATE SCH: .5; 3 SOLUTION RESPIRATORY (INHALATION) at 12:40

## 2017-03-19 RX ADMIN — INSULIN LISPRO SCH: 100 INJECTION, SOLUTION INTRAVENOUS; SUBCUTANEOUS at 11:50

## 2017-03-19 RX ADMIN — METOPROLOL TARTRATE SCH MG: 25 TABLET, FILM COATED ORAL at 20:45

## 2017-03-19 RX ADMIN — BUMETANIDE SCH MG: 0.25 INJECTION, SOLUTION INTRAMUSCULAR; INTRAVENOUS at 17:11

## 2017-03-19 RX ADMIN — IPRATROPIUM BROMIDE AND ALBUTEROL SULFATE SCH ML: .5; 3 SOLUTION RESPIRATORY (INHALATION) at 15:53

## 2017-03-19 RX ADMIN — INSULIN LISPRO SCH UNIT: 100 INJECTION, SOLUTION INTRAVENOUS; SUBCUTANEOUS at 06:38

## 2017-03-19 RX ADMIN — ATORVASTATIN CALCIUM SCH MG: 40 TABLET, FILM COATED ORAL at 20:45

## 2017-03-19 RX ADMIN — LOSARTAN POTASSIUM SCH MG: 50 TABLET, FILM COATED ORAL at 08:15

## 2017-03-19 RX ADMIN — METOPROLOL TARTRATE SCH MG: 25 TABLET, FILM COATED ORAL at 08:15

## 2017-03-19 RX ADMIN — METHYLPREDNISOLONE SODIUM SUCCINATE SCH MG: 40 INJECTION, POWDER, FOR SOLUTION INTRAMUSCULAR; INTRAVENOUS at 20:49

## 2017-03-19 RX ADMIN — IPRATROPIUM BROMIDE AND ALBUTEROL SULFATE SCH ML: .5; 3 SOLUTION RESPIRATORY (INHALATION) at 20:23

## 2017-03-19 RX ADMIN — INSULIN LISPRO SCH UNIT: 100 INJECTION, SOLUTION INTRAVENOUS; SUBCUTANEOUS at 20:53

## 2017-03-19 RX ADMIN — BUDESONIDE SCH MG: 0.5 INHALANT ORAL at 08:29

## 2017-03-19 RX ADMIN — METHYLPREDNISOLONE SODIUM SUCCINATE SCH MG: 125 INJECTION, POWDER, FOR SOLUTION INTRAMUSCULAR; INTRAVENOUS at 06:37

## 2017-03-19 RX ADMIN — HYDROMORPHONE HYDROCHLORIDE PRN MG: 1 INJECTION, SOLUTION INTRAMUSCULAR; INTRAVENOUS; SUBCUTANEOUS at 20:37

## 2017-03-19 RX ADMIN — THERA TABS SCH EACH: TAB at 08:15

## 2017-03-19 RX ADMIN — SODIUM CHLORIDE, PRESERVATIVE FREE SCH ML: 5 INJECTION INTRAVENOUS at 08:15

## 2017-03-19 NOTE — PN
DATE OF SERVICE: 03/19/2017



Ms. Teresa Davis is seen, evaluated and examined on the 6th floor. 

This is a 71-year-old female who came into the hospital with shortness 

of breath, hypoxia, difficulty in breathing, found to have congestive 

heart failure likely. Clinically patient is doing slightly better; 

awake and alert, breathing comfortably, in no obvious distress at 

present. Patient has atrial fibrillation, which is better under 

control; however, continues to manifest irregular rhythm.  



On examination, blood pressure 120/60, respiratory rate 18, pulse 78, 

temperature 98, saturation 97%. Her I's and O's are 240 positive.  

HEENT: Otherwise unremarkable. 

NECK: Supple without lymphadenopathy, jugular venous distention or 

carotid bruits.  

HEART: Irregularly irregular. S1 and S2 audible. 

ABDOMEN: Soft. 

LUNGS: Bilateral poor air entry with few crackles at bases. 

HEART: Regular rate and rhythm. S1 and S2 audible. 

ABDOMEN: Soft. 

EXTREMITIES: Trace edema. 

NEUROLOGICAL: Otherwise, awake and alert. 



Labs are reviewed. White cell count 7800, hemoglobin 8.2, hematocrit 

27, platelet count 215,000. BUN and creatinine 46 and 1.49 (      ). 

Sodium 130, potassium 5.0, chloride is 96, CO2 is 30. white cell count 

is down to 7800 from 11,200 yesterday. Renal functions are 

progressively getting worse.  



Current medications reviewed and include: 

1. DuoNeb unit dose updraft 4 times a day. 

2. Also on Xanax 0.5 daily. 

3. Norvasc 10 mg daily. 

4. Lipitor 40 mg daily. 

5. Pulmicort 2 times a day. 

6. Bumex 1 mg IV q.12. 

7. Rocephin 1 g daily. 

8. Also on hydralazine. 

9. Dilaudid. 

10. Sliding scale insulin. 

11. Cozaar. 

12. Solu-Medrol 60 every 6 hours. 

13. Metoprolol. 

14. Singulair. 

15. Theragran. 

16. Zofran. 

17. Protonix. 



Culture results and reports are reviewed. Urine culture is positive 

for E. coli which is sensitive to Rocephin.  



IMPRESSION: 

1. Sepsis associated with urinary tract infection related to 

Escherichia coli sensitive to Rocephin, doing well.  

2. Acute exacerbation of congestive heart failure, likely acute on 

chronic diastolic heart failure.  

3. Atrial fibrillation. Rate is well under control now. 

4. Developing renal failure, stage 3. Will recommend to lower down not 

only the steroids, but also the Bumex. May get 0.5 mg q.12.  

5. Will obtain a followup chest x-ray, as the last x-ray was performed 

back on 16th of March.

## 2017-03-19 NOTE — PN
INTERVAL HISTORY: Patient continued to be hemodynamically stable; 

denying chest pain, shortness breath, nausea, vomiting, abdominal 

pain, dizziness, lightheadedness or blurry vision. Patient's capsule 

study ended yesterday, but no results updated at this point.  



PHYSICAL EXAMINATION:

VITAL SIGNS: 97.9, 61, 18, 131/61 and saturation is 97% on room air. 

LUNGS: Diminished bilaterally. 

HEART: Normal S1, S2. 

ABDOMEN: Soft, no tenderness. Positive bowel sounds in all 4 quadrants. 

LOWER EXTREMITIES: No edema. 

PSYCH: Alert, awake and oriented, following commands. 



IMAGING AND LAB: CBC showed the hemoglobin 8.2, normal otherwise. 

Chem-7 showed worsening in her creatinine to 1.49. Sodium 136 and 

glucose 131. Her magnesium is 2.3.  



ASSESSMENT AND PLAN:

1. Gastrointestinal bleed, status post capsule study waiting on Dr. Mejia's reading for the test. Hemoglobin has been stable. Vital signs 

stable. Will continue monitoring, continue proton pump inhibitors.  

2. Escherichia coli urinary tract infection, sensitive to Rocephin. 

Will continue with current medication.  

3. Anxiety, under fair control. 

4. Congestive heart failure. Discussed with Dr. Dykes, who is going to 

switch patient to oral Bumex in the next 24 hours.  

5. Hyperglycemia. Will continue sliding scale. 

6. Hypertension, under fair control. 

7. Gastroesophageal reflux disease. Will continue Protonix. 

8. Discharge process in the morning if patient continues to be 

hemodynamically stable.

## 2017-03-19 NOTE — P.PN
Subjective


Principal diagnosis: 





Atrial fibrillation





This is a pleasant 71-year-old  female patient with a past medical 

history significant for CAD with a prior stenting, hypertension, dyslipidemia, 

and paroxysmal atrial fibrillation was admitted to the hospital with anemia.


The patient underwent a capsule study and the results still pending at this 

point.


She continues to be in A. fib with controlled heart rate on the current medical 

regimen.





I will continue the current medical treatment.  The heart rate has been under 

good control.  Continue holding on any anticoagulation until we know the 

results of the capsule study.





Objective





- Vital Signs


Vital signs: 


 Vital Signs











Temp  97.9 F   03/19/17 08:00


 


Pulse  78   03/19/17 08:44


 


Resp  18   03/19/17 08:00


 


BP  121/57   03/19/17 04:00


 


Pulse Ox  97   03/19/17 08:00








 Intake & Output











 03/18/17 03/19/17 03/19/17





 18:59 06:59 18:59


 


Intake Total 360 1490 240


 


Balance 360 1490 240


 


Weight 70.1 kg 70.6 kg 


 


Intake:   


 


  IV  0 


 


    .9 KVO  0 


 


  Intake, IV Titration  50 





  Amount   


 


    cefTRIAXone 1,000 mg In  50 





    Sodium Chloride 0.9% 50   





    ml @ 100 mls/hr IVPB   





    Q12HR [facility] Rx#:[id number]   


 


  Oral 360 1440 240


 


Other:   


 


  Voiding Method  Toilet 


 


  # Voids 1 3 1


 


  # Bowel Movements 1  














- Constitutional


General appearance: Present: no acute distress





- Respiratory


Respiratory: bilateral: diminished





- Cardiovascular


Rhythm: irregularly irregular





- Labs


CBC & Chem 7: 


 03/19/17 05:41





 03/19/17 05:41


Labs: 


 Abnormal Lab Results - Last 24 Hours (Table)











  03/18/17 03/18/17 03/19/17 Range/Units





  16:41 20:15 05:41 


 


RBC    3.13 L  (3.80-5.40)  m/uL


 


Hgb    8.2 L  (11.4-16.0)  gm/dL


 


Hct    26.9 L  (34.0-46.0)  %


 


MCHC    30.4 L  (31.0-37.0)  g/dL


 


RDW    17.9 H  (11.5-15.5)  %


 


Lymphocytes # (Manual)    0.1 L  (1.0-4.8)  k/uL


 


Sodium     (137-145)  mmol/L


 


Chloride     ()  mmol/L


 


BUN     (7-17)  mg/dL


 


Creatinine     (0.52-1.04)  mg/dL


 


Glucose     (74-99)  mg/dL


 


POC Glucose (mg/dL)  161 H  148 H   (75-99)  mg/dL














  03/19/17 03/19/17 03/19/17 Range/Units





  05:41 06:28 11:36 


 


RBC     (3.80-5.40)  m/uL


 


Hgb     (11.4-16.0)  gm/dL


 


Hct     (34.0-46.0)  %


 


MCHC     (31.0-37.0)  g/dL


 


RDW     (11.5-15.5)  %


 


Lymphocytes # (Manual)     (1.0-4.8)  k/uL


 


Sodium  136 L    (137-145)  mmol/L


 


Chloride  96 L    ()  mmol/L


 


BUN  46 H    (7-17)  mg/dL


 


Creatinine  1.49 H    (0.52-1.04)  mg/dL


 


Glucose  131 H    (74-99)  mg/dL


 


POC Glucose (mg/dL)   145 H  134 H  (75-99)  mg/dL














Assessment and Plan


Plan: 





Assessment


#1 A. fib with controlled heart rate


#2 anemia with unknown etiology at this point


#3 coronary artery disease


#4 multiple comorbid conditions





Plan


#1 continue the current medical treatment


#2 continue holding on anticoagulation


#3 follow-up with the patient

## 2017-03-19 NOTE — PN
Teresa Davis is a 71-year-old female, seen, evaluated and examined on 

the 6th floor. Patient is a 71-year-old female with recent 

gastrointestinal bleed and status post EGD and colonoscopy in the 

past. The patient has been tolerating p.o. well. No other specific 

complaints are present. Patient also being treated for E. coli urinary 

tract infection. Other issues include hypertension, GERD and 

hyperglycemia, overall are stable. Patient continues to be diuresed 

with Bumex.  



On examination, vitals include blood pressure is 127/62, respiratory 

rate 16, pulse 73, temperature 98, saturation of 95%.  

HEENT: Unremarkable. 

NECK: Supple. 

LUNGS: Good air entry bilaterally. 

HEART: Regular rate and rhythm. 

ABDOMEN: Soft. 

NEUROLOGICAL: Awake and alert. 



Labs reviewed. White cell count 11,200, hemoglobin 7.725, platelet 

count 207,000. Sodium 133, potassium 4.2. BUN and creatinine 35 and 

1.32.  



IMPRESSION:

1. Acute exacerbation of congestive heart failure. 

2. Severe anemia and gastrointestinal bleed. 

3. Escherichia coli urinary tract infection. 

4. History of coronary artery disease. 

5. History of smoking and nicotine use. 



Plan is to continue current supportive care. Repeat labs tomorrow. 

Follow clinical course closely. Continue empiric antibiotics which 

should be covering for pneumonia, which however, is clinically less 

likely, as the last chest x-ray on 03/16 revealed more of 

emphysematous changes and interstitial edema likely of CHF. Will 

follow.

## 2017-03-20 VITALS — DIASTOLIC BLOOD PRESSURE: 63 MMHG | TEMPERATURE: 97.9 F | SYSTOLIC BLOOD PRESSURE: 145 MMHG

## 2017-03-20 VITALS — HEART RATE: 76 BPM

## 2017-03-20 VITALS — RESPIRATION RATE: 18 BRPM

## 2017-03-20 LAB
ANION GAP SERPL CALC-SCNC: 9 MMOL/L
BUN SERPL-SCNC: 49 MG/DL (ref 7–17)
CALCIUM SPEC-MCNC: 8.6 MG/DL (ref 8.4–10.2)
CELLS COUNTED: 100
CH: 25.9
CHCM: 30.6
CHLORIDE SERPL-SCNC: 96 MMOL/L (ref 98–107)
CO2 SERPL-SCNC: 32 MMOL/L (ref 22–30)
ERYTHROCYTE [DISTWIDTH] IN BLOOD BY AUTOMATED COUNT: 3.17 M/UL (ref 3.8–5.4)
ERYTHROCYTE [DISTWIDTH] IN BLOOD: 17.7 % (ref 11.5–15.5)
GLUCOSE BLD-MCNC: 130 MG/DL (ref 75–99)
GLUCOSE BLD-MCNC: 204 MG/DL (ref 75–99)
GLUCOSE SERPL-MCNC: 111 MG/DL (ref 74–99)
HCT VFR BLD AUTO: 26.7 % (ref 34–46)
HDW: 4.41
HGB BLD-MCNC: 8.3 GM/DL (ref 11.4–16)
MAGNESIUM SPEC-SCNC: 2.2 MG/DL (ref 1.6–2.3)
MCH RBC QN AUTO: 26.2 PG (ref 25–35)
MCHC RBC AUTO-ENTMCNC: 31.1 G/DL (ref 31–37)
MCV RBC AUTO: 84.2 FL (ref 80–100)
NON-AFRICAN AMERICAN GFR(MDRD): 42
PHOSPHATE SERPL-MCNC: 3.2 MG/DL (ref 2.5–4.5)
POTASSIUM SERPL-SCNC: 3.9 MMOL/L (ref 3.5–5.1)
SODIUM SERPL-SCNC: 137 MMOL/L (ref 137–145)
WBC # BLD AUTO: 6.4 K/UL (ref 3.8–10.6)
WBC (PEROX): 6.53

## 2017-03-20 RX ADMIN — BUDESONIDE SCH MG: 0.5 INHALANT ORAL at 07:45

## 2017-03-20 RX ADMIN — PANTOPRAZOLE SODIUM SCH MG: 40 TABLET, DELAYED RELEASE ORAL at 07:43

## 2017-03-20 RX ADMIN — SODIUM CHLORIDE, PRESERVATIVE FREE SCH ML: 5 INJECTION INTRAVENOUS at 07:45

## 2017-03-20 RX ADMIN — METHYLPREDNISOLONE SODIUM SUCCINATE SCH MG: 40 INJECTION, POWDER, FOR SOLUTION INTRAMUSCULAR; INTRAVENOUS at 07:44

## 2017-03-20 RX ADMIN — BUMETANIDE SCH MG: 0.25 INJECTION, SOLUTION INTRAMUSCULAR; INTRAVENOUS at 03:27

## 2017-03-20 RX ADMIN — IPRATROPIUM BROMIDE AND ALBUTEROL SULFATE SCH ML: .5; 3 SOLUTION RESPIRATORY (INHALATION) at 11:42

## 2017-03-20 RX ADMIN — THERA TABS SCH EACH: TAB at 07:45

## 2017-03-20 RX ADMIN — LOSARTAN POTASSIUM SCH MG: 50 TABLET, FILM COATED ORAL at 07:44

## 2017-03-20 RX ADMIN — IPRATROPIUM BROMIDE AND ALBUTEROL SULFATE SCH ML: .5; 3 SOLUTION RESPIRATORY (INHALATION) at 07:45

## 2017-03-20 RX ADMIN — INSULIN LISPRO SCH: 100 INJECTION, SOLUTION INTRAVENOUS; SUBCUTANEOUS at 07:51

## 2017-03-20 RX ADMIN — INSULIN LISPRO SCH UNIT: 100 INJECTION, SOLUTION INTRAVENOUS; SUBCUTANEOUS at 12:56

## 2017-03-20 RX ADMIN — METOPROLOL TARTRATE SCH MG: 25 TABLET, FILM COATED ORAL at 07:44

## 2017-03-20 RX ADMIN — IPRATROPIUM BROMIDE AND ALBUTEROL SULFATE SCH: .5; 3 SOLUTION RESPIRATORY (INHALATION) at 15:30

## 2017-03-20 NOTE — XR
EXAMINATION TYPE: XR chest 1V portable

 

DATE OF EXAM: 3/20/2017 7:17 AM

 

COMPARISON: Prior chest x-ray 16 March 2017

 

HISTORY: Congestive heart failure

 

TECHNIQUE: Single frontal view of the chest is obtained.

 

FINDINGS:  There is blunting of the left costophrenic angle. No evident pneumothorax. Interstitium is
 increased. Heart is enlarged. Pulmonary vascularity and nick not significantly changed.

 

IMPRESSION:  Interval left pleural effusion. There may be some improvement in patient's interstitial 
edema. Additional follow-up suggested.

## 2017-03-20 NOTE — P.DS
Providers


Date of admission: 


03/15/17 20:59





Expected date of discharge: 03/20/17


Attending physician: 


Kinjal Hood





Consults: 





 





03/15/17 21:04


Consult Physician Urgent 


   Consulting Provider: Montrell Mccord


   Consult Reason/Comments: chf


   Do you want consulting provider notified?: Yes





03/15/17 23:20


Consult Physician Urgent 


   Consulting Provider: Kylie Miller


   Consult Reason/Comments: GI bleed


   Do you want consulting provider notified?: Yes











Primary care physician: 


Kinjal Hood





Hospital Course: 





This is a 77-year-old  female one of my patient with a previous 

medical history significant for CAD post-PCI of the LAD, hypertension and 

hypertensive cardio vascular disease with left ventricular hypertrophy, 

hyperlipidemia, history of the for arterial occlusive disease, carotid artery 

disease status post right carotid endarterectomy with about 70% stenosis of 

bilateral carotid arteries, and her the cardiovascular surgery, cardiology, 

pulmonary medicine, and gastroenterology and has been recently following by Dr. Dangelo from neurology for epidural injection and was taken off her Xarelto for 

48 hours prior to that, patient apparently was brought into the emergency 

department at [facility] yesterday via EMS after her granddaughter 

called EMS because of increased shortness of breath as well as not feeling well 

generalized weakness for the past few days, she was found to have a hemoglobin 

of 6.5, and she was in acute diastolic heart failure for which she was placed 

on BiPAP in the ER she was given Lasix and he belies treatment she felt a lot 

better however because of her complex medical issues she was admitted to the 

intensive care unit, Dr. ONDINA Bolton was consulted along with cardiology since her 

cardiac enzymes were elevated and gastroenterology because of her acute on 

chronic anemia, her guaiac is positive patient underwent EGD and colonoscopy 

last one in 2017 2 weeks ago at [facility] and her last EGD and 

colonoscopy to that was by Dr. Kessler in 2016 where she was found to have hiatal 

hernia as well as colon polyps and diverticulosis.  She was recently 

hospitalized at [facility] weeks ago because of left frontal ischemic 

CVA and she has been on Eliquis for quite sometimes now however because of 

insurance purposes she was switched to Xarelto recently.





3/17:patient is status post transfusion of 2 units of packed RBC with current 

hemoglobin of 7.6. Echocardiogram reveals moderate mitral regurgitation, mild 

tricuspid regurgitation, severe pulmonary hypertension, mild concentric left 

ventricular hypertrophy, EF 45-50%.  Cardiology is following the patient.  

Elevated troponins thought to be secondary to ischemia and type II myocardial 

infarction.  Patient has been seen by Dr. COURTNEY Miller with plan for small bowel 

capsule endoscopy, Protonix, iron studies. No bleeding noted. She is undergoing 

capsule endoscopy. Patient has been hemodynamically stable and will be 

transferred to [facility] today. 





3/20: Capsule endoscopy has been read by Dr. Mejia with normal exam and no 

contraindication to resuming anticoagulation. Hemoglobin 8.3 and stable. BUN 49 

and creatinine 1.25. Patient will be discharged home today in stable condition.








Discharge diagnoses:


1.  Acute diastolic heart failure.  


2.  Acute on chronic GI bleed.  


3.  Acute blood loss anemia.  Status post transfusion 2 units of packed red 

blood cells.


4.  Paroxysmal Atrial fibrillation. 


5.  CAD post-PCI of the LAD. 


6.  History of CVA of the left frontal lobe. 


7.  Carotid artery disease as well as PAD.  


8.  E coli UTI with sepsis.  


9.  Hypertension and hypertensive cardiovascular disease. 


10.  Hyperlipidemia.  


11.  ALLERGIC rhinitis.  


12.  Lumbar degenerative disc disease with spondylolisthesis and facet 

arthropathy.  Post epidural injection





Discharge plan: home





Impression and plan of care have been directed as dictated by the signing 

physician.  Danielle Herbert nurse practitioner acting as scribe for signing 

physician.





Patient Condition at Discharge: Good





Plan - Discharge Summary


New Discharge Prescriptions: 


Amoxicillin/Potassium Clav [Augmentin 875-125 Tablet] 1 each PO Q12HR #10 tab


Sucralfate [Carafate] 1 gm PO BID #600 ml


predniSONE 40 mg PO DAILY #60 tab


Discharge Medication List





Montelukast Sodium [Singulair] 10 mg PO HS 09/20/14 [History]


ALPRAZolam [Xanax] 0.5 mg PO DAILY 12/05/15 [History]


Apixaban [Eliquis] 2.5 mg PO BID 12/05/15 [History]


Metoprolol Tartrate [Lopressor] 25 mg PO BID 12/05/15 [History]


Nitroglycerin Sl Tabs [Nitrostat] 0.4 mg SUBLINGUAL Q5M PRN 12/05/15 [History]


Atorvastatin [Lipitor] 40 mg PO HS 12/06/15 [History]


Losartan [Cozaar] 50 mg PO DAILY #30 tab 12/08/15 [Rx]


Ergocalciferol [Vitamin D2 (DRISDOL)] 50,000 unit PO Q7D 03/15/17 [History]


Ipratropium-Albuterol Nebulize [Duoneb 0.5 mg-3 mg/3 ml Soln] 3 ml INHALATION RT

-QID PRN 03/15/17 [History]


Melatonin 3 mg PO HS 03/15/17 [History]


Multivitamins, Thera [Multivitamin (formulary)] 1 tab PO DAILY 03/15/17 [History

]


Omeprazole 40 mg PO DAILY 03/15/17 [History]


amLODIPine [Norvasc] 10 mg PO DAILY 03/15/17 [History]


hydrALAZINE HCL [Apresoline] 75 mg PO Q8H 03/15/17 [History]


oxyCODONE HCL 20 mg PO TID 03/15/17 [History]


Amoxicillin/Potassium Clav [Augmentin 875-125 Tablet] 1 each PO Q12HR #10 tab 03 /20/17 [Rx]


Sucralfate [Carafate] 1 gm PO BID #600 ml 03/20/17 [Rx]


predniSONE 40 mg PO DAILY #60 tab 03/20/17 [Rx]








Follow up Appointment(s)/Referral(s): 


Montrell Mccord MD [STAFF PHYSICIAN] - 04/14/17 4:00 pm


Kinjal Hood MD [Primary Care Provider] - 03/30/17 4:45 pm (CBC anad BMP at Dr. Hood's office)


Dorie Mendez DO [Doctor of Osteopathic Medicine] - 3 Weeks


Patient Instructions/Handouts:  Sucralfate (By mouth), Prednisone (By mouth), 

Amoxicillin/Clavulanate Potassium (By mouth), Heart Failure (DC), Urinary Tract 

Infection in Women (DC)


Discharge Disposition: HOME SELF-CARE

## 2017-03-20 NOTE — P.PN
Subjective





71-year-old female with a history of anemia, CAD/ PCI stent, ischemic 

cardiomyopathy, valvular heart disease, peripheral arterial occlusive disease, 

carotid artery disease, COPD 2 L O2 dependent, MI, cholecystectomy, GERD, 

hyperlipidemia, atrial fibrillation, CVA/TIA, diverticulosis, and 

osteoarthritis.  Presents with shortness of breath, elevated troponin and 

anemia.  Troponin 0.72.  Hemoglobin 7.1.  MCV 81.  Platelet 236.  Hemoglobin 

this morning 6.7.  She received 2 units of blood.  Denies overt bleeding such 

as hematemesis, hematochezia, or melena however Hemoccult stool positive.  

Evaluated at [facility] last month for anemia; EGD performed by 

myself showed mild gastritis with no evidence of peptic ulcer disease.  EGD 

colonoscopy July 2016 performed by Dr. Weiss reported antral gastritis and 

sigmoid diverticulosis with polypectomy.





Hb yesterday was 7.6, today it is 8.2.  Denies nausea, vomiting, epigastric or 

abdominal pain.





Capsule endoscopy completed yesterday was reviewed and it showed a normal exam 

with no angiectasias in the small bowel, bleeding or other  pathology.











Objective





- Vital Signs


Vital signs: 


 Vital Signs











Temp  98.4 F   03/19/17 22:09


 


Pulse  89   03/19/17 22:09


 


Resp  18   03/19/17 22:09


 


BP  142/63   03/19/17 22:09


 


Pulse Ox  93 L  03/19/17 22:09








 Intake & Output











 03/19/17 03/19/17 03/20/17





 06:59 18:59 06:59


 


Intake Total 1490 340 


 


Balance 1490 340 


 


Weight 70.6 kg  


 


Intake:   


 


  IV 0  


 


    .9 KVO 0  


 


  Intake, IV Titration 50  





  Amount   


 


    cefTRIAXone 1,000 mg In 50  





    Sodium Chloride 0.9% 50   





    ml @ 100 mls/hr IVPB   





    Q12HR [facility] Rx#:[id number]   


 


  Oral 1440 340 


 


Other:   


 


  Voiding Method Toilet  Toilet


 


  # Voids 3 1 














- Exam





General appearance: The patient is alert, oriented, in no acute distress.


HENT: Head is normocephalic and atraumatic.  Pupils are equal and reactive.  

Oropharynx is clear without lesions.


Neck: Supple without lymphadenopathy.  Trachea midline.


Heart: S1 S2. 


Lungs: No crackles or wheezes are heard. No dullness to percussion.


Abdomen: Soft, nontender, nondistended with  bowel sounds.  No peritoneal 

signs.  No palpable organomegaly or masses.


Extremities: Normal skin color and turgor.  No cyanosis, rash, ulceration, 

clubbing, or edema.  Radial and pedal pulses are 2/4 bilaterally.


Neurological: No focal deficits.  Strength and sensation are grossly intact.














- Labs


CBC & Chem 7: 


 03/19/17 05:41





 03/19/17 05:41


Labs: 


 Abnormal Lab Results - Last 24 Hours (Table)











  03/19/17 03/19/17 03/19/17 Range/Units





  05:41 05:41 06:28 


 


RBC  3.13 L    (3.80-5.40)  m/uL


 


Hgb  8.2 L    (11.4-16.0)  gm/dL


 


Hct  26.9 L    (34.0-46.0)  %


 


MCHC  30.4 L    (31.0-37.0)  g/dL


 


RDW  17.9 H    (11.5-15.5)  %


 


Lymphocytes # (Manual)  0.1 L    (1.0-4.8)  k/uL


 


Sodium   136 L   (137-145)  mmol/L


 


Chloride   96 L   ()  mmol/L


 


BUN   46 H   (7-17)  mg/dL


 


Creatinine   1.49 H   (0.52-1.04)  mg/dL


 


Glucose   131 H   (74-99)  mg/dL


 


POC Glucose (mg/dL)    145 H  (75-99)  mg/dL














  03/19/17 03/19/17 03/19/17 Range/Units





  11:36 16:33 20:55 


 


RBC     (3.80-5.40)  m/uL


 


Hgb     (11.4-16.0)  gm/dL


 


Hct     (34.0-46.0)  %


 


MCHC     (31.0-37.0)  g/dL


 


RDW     (11.5-15.5)  %


 


Lymphocytes # (Manual)     (1.0-4.8)  k/uL


 


Sodium     (137-145)  mmol/L


 


Chloride     ()  mmol/L


 


BUN     (7-17)  mg/dL


 


Creatinine     (0.52-1.04)  mg/dL


 


Glucose     (74-99)  mg/dL


 


POC Glucose (mg/dL)  134 H  172 H  147 H  (75-99)  mg/dL














Assessment and Plan


Plan: 





Assessment and Plan


(1) Acute GI bleeding


Narrative/Plan: 


EGD February 2017 reported no evidence of peptic ulcer disease.  EGD/

colonoscopy July 2016 reported no evidence of peptic ulcer disease with sigmoid 

diverticulosis with polypectomy.  Etiology of anemia could be slow occult GI 

blood loss from small bowel pathology exacerbated by antiplatelet therapy. 

Patient had a  capsule endoscopy study which is normal.


Status: Acute   





(2) Stool guaiac positive


Status: Acute   





(3) Acute blood loss anemia


Status: Acute   





(4) COPD (chronic obstructive pulmonary disease)


Status: Acute   





(5) Oxygen dependent


Status: Acute   





(6) Elevated troponin


Status: Acute   





(7) Sigmoid diverticulosis


Status: Chronic   





Will discuss with cardiology. No findings on the capsule endoscopy to 

contraindicate re-starting antiplatelet therapy.

## 2017-03-20 NOTE — P.PN
Subjective


Principal diagnosis: 





Atrial fibrillation





This is a pleasant 71-year-old  female patient with a past medical 

history significant for CAD with a prior stenting, hypertension, dyslipidemia, 

and paroxysmal atrial fibrillation was admitted to the hospital with anemia.


The patient underwent a capsule study and the results still pending at this 

point.


She continues to be in A. fib with controlled heart rate on the current medical 

regimen.


The patient underwent a capsule study and it came in to be unremarkable without 

any evidence of bleeding.





I recommended restarting the patient back on Eliquis.  Hemoglobin today is 8.3.


From the cardiovascular standpoint overview, the patient can be discharged home 

and she follows with Dr. Mccord as an outpatient





Objective





- Vital Signs


Vital signs: 


 Vital Signs











Temp  97.9 F   03/20/17 07:00


 


Pulse  88   03/20/17 08:00


 


Resp  16   03/20/17 07:45


 


BP  145/63   03/20/17 07:00


 


Pulse Ox  90 L  03/20/17 07:00








 Intake & Output











 03/19/17 03/20/17 03/20/17





 18:59 06:59 18:59


 


Intake Total 340  


 


Balance 340  


 


Weight  69.5 kg 


 


Intake:   


 


  Oral 340  


 


Other:   


 


  Voiding Method  Toilet Toilet


 


  # Voids 1 2 














- Constitutional


General appearance: Present: no acute distress





- Respiratory


Respiratory: bilateral: rhonchi





- Cardiovascular


Rhythm: irregularly irregular


Heart sounds: normal: S1, S2


Abnormal Heart Sounds: Present: systolic murmur





- Labs


CBC & Chem 7: 


 03/20/17 08:10





 03/20/17 08:10


Labs: 


 Abnormal Lab Results - Last 24 Hours (Table)











  03/19/17 03/19/17 03/19/17 Range/Units





  11:36 16:33 20:55 


 


RBC     (3.80-5.40)  m/uL


 


Hgb     (11.4-16.0)  gm/dL


 


Hct     (34.0-46.0)  %


 


RDW     (11.5-15.5)  %


 


Chloride     ()  mmol/L


 


Carbon Dioxide     (22-30)  mmol/L


 


BUN     (7-17)  mg/dL


 


Creatinine     (0.52-1.04)  mg/dL


 


Glucose     (74-99)  mg/dL


 


POC Glucose (mg/dL)  134 H  172 H  147 H  (75-99)  mg/dL














  03/20/17 03/20/17 03/20/17 Range/Units





  07:48 08:10 08:10 


 


RBC   3.17 L   (3.80-5.40)  m/uL


 


Hgb   8.3 L   (11.4-16.0)  gm/dL


 


Hct   26.7 L   (34.0-46.0)  %


 


RDW   17.7 H   (11.5-15.5)  %


 


Chloride    96 L  ()  mmol/L


 


Carbon Dioxide    32 H  (22-30)  mmol/L


 


BUN    49 H  (7-17)  mg/dL


 


Creatinine    1.25 H  (0.52-1.04)  mg/dL


 


Glucose    111 H  (74-99)  mg/dL


 


POC Glucose (mg/dL)  130 H    (75-99)  mg/dL














Assessment and Plan


Plan: 





Assessment


#1 A. fib with controlled heart rate


#2 anemia with unknown etiology at this point


#3 coronary artery disease


#4 multiple comorbid conditions





Plan


#1 continue the current medical treatment


#2 restart the patient back on Eliquis


#3 the patient can be discharged home

## 2017-03-20 NOTE — P.PN
Subjective





71-year-old female with a history of anemia, CAD/ PCI stent, ischemic 

cardiomyopathy, valvular heart disease, peripheral arterial occlusive disease, 

carotid artery disease, COPD 2 L O2 dependent, MI, cholecystectomy, GERD, 

hyperlipidemia, atrial fibrillation, CVA/TIA, diverticulosis, and 

osteoarthritis.  Presents with shortness of breath, elevated troponin and 

anemia.  Troponin 0.72.  Hemoglobin 7.1.  MCV 81.  Platelet 236.  Hemoglobin 

this morning 6.7.  She received 2 units of blood.  Denies overt bleeding such 

as hematemesis, hematochezia, or melena however Hemoccult stool positive.  

Evaluated at [facility] last month for anemia; EGD performed by 

myself showed mild gastritis with no evidence of peptic ulcer disease.  EGD 

colonoscopy July 2016 performed by Dr. Weiss reported antral gastritis and 

sigmoid diverticulosis with polypectomy.





Hb yesterday was 7.6, today it is 8.2.  Denies nausea, vomiting, epigastric or 

abdominal pain. Has just completed a capsule endoscopy study. Download not 

completed yet.











Objective





- Vital Signs


Vital signs: 


 Vital Signs











Temp  97.5 F L  03/18/17 08:00


 


Pulse  82   03/18/17 08:25


 


Resp  16   03/18/17 08:00


 


BP  117/58   03/18/17 08:00


 


Pulse Ox  97   03/18/17 08:00








 Intake & Output











 03/17/17 03/18/17 03/18/17





 18:59 06:59 18:59


 


Intake Total 260 260 180


 


Output Total 1105  


 


Balance -845 260 180


 


Weight  70.1 kg 70.1 kg


 


Intake:   


 


   160 


 


    .9  160 


 


  Intake, IV Titration 100 100 





  Amount   


 


    cefTRIAXone 1,000 mg In 100 100 





    Sodium Chloride 0.9% 50   





    ml @ 100 mls/hr IVPB   





    Q12HR [facility] Rx#:[id number]   


 


  Oral   180


 


Output:   


 


  Urine 1105  


 


Other:   


 


  Voiding Method Indwelling Catheter  


 


  # Bowel Movements   1














- Exam





General appearance: The patient is alert, oriented, in no acute distress.


HENT: Head is normocephalic and atraumatic.  Pupils are equal and reactive.  

Oropharynx is clear without lesions.


Neck: Supple without lymphadenopathy.  Trachea midline.


Heart: S1 S2. 


Lungs: No crackles or wheezes are heard. No dullness to percussion.


Abdomen: Soft, nontender, nondistended with  bowel sounds.  No peritoneal 

signs.  No palpable organomegaly or masses.


Extremities: Normal skin color and turgor.  No cyanosis, rash, ulceration, 

clubbing, or edema.  Radial and pedal pulses are 2/4 bilaterally.


Neurological: No focal deficits.  Strength and sensation are grossly intact.














- Labs


CBC & Chem 7: 


 03/19/17 05:41





 03/19/17 05:41


Labs: 


 Abnormal Lab Results - Last 24 Hours (Table)











  03/17/17 03/17/17 03/18/17 Range/Units





  16:51 21:20 05:58 


 


WBC     (3.8-10.6)  k/uL


 


RBC     (3.80-5.40)  m/uL


 


Hgb     (11.4-16.0)  gm/dL


 


Hct     (34.0-46.0)  %


 


MCHC     (31.0-37.0)  g/dL


 


RDW     (11.5-15.5)  %


 


Lymphocytes #     (1.0-4.8)  k/uL


 


Monocytes #     (0-1.0)  k/uL


 


Sodium     (137-145)  mmol/L


 


Chloride     ()  mmol/L


 


BUN     (7-17)  mg/dL


 


Creatinine     (0.52-1.04)  mg/dL


 


Glucose     (74-99)  mg/dL


 


POC Glucose (mg/dL)  278 H  103 H  142 H  (75-99)  mg/dL














  03/18/17 03/18/17 Range/Units





  06:28 06:28 


 


WBC  11.2 H   (3.8-10.6)  k/uL


 


RBC  2.97 L   (3.80-5.40)  m/uL


 


Hgb  7.7 L   (11.4-16.0)  gm/dL


 


Hct  25.5 L   (34.0-46.0)  %


 


MCHC  30.1 L   (31.0-37.0)  g/dL


 


RDW  17.8 H   (11.5-15.5)  %


 


Lymphocytes #  0.2 L   (1.0-4.8)  k/uL


 


Monocytes #  6.5 H   (0-1.0)  k/uL


 


Sodium   133 L  (137-145)  mmol/L


 


Chloride   97 L  ()  mmol/L


 


BUN   35 H  (7-17)  mg/dL


 


Creatinine   1.32 H  (0.52-1.04)  mg/dL


 


Glucose   134 H  (74-99)  mg/dL


 


POC Glucose (mg/dL)    (75-99)  mg/dL














Assessment and Plan


Plan: 





Assessment and Plan


(1) Acute GI bleeding


Narrative/Plan: 


EGD February 2017 reported no evidence of peptic ulcer disease.  EGD/

colonoscopy July 2016 reported no evidence of peptic ulcer disease with sigmoid 

diverticulosis with polypectomy.  Etiology of anemia could be slow occult GI 

blood loss from small bowel pathology exacerbated by antiplatelet therapy. 

Patient just completed a capsule endoscopy.


Status: Acute   





(2) Stool guaiac positive


Status: Acute   





(3) Acute blood loss anemia


Status: Acute   





(4) COPD (chronic obstructive pulmonary disease)


Status: Acute   





(5) Oxygen dependent


Status: Acute   





(6) Elevated troponin


Status: Acute   





(7) Sigmoid diverticulosis


Status: Chronic   





Discussed with cardiology. Will review capsule study prior to restart of 

antiplatelet therapy.

## 2017-03-20 NOTE — P.PN
Subjective


Principal diagnosis: 





Sepsis





Patient seen and examined.  Patient states she is feeling a little better 

overal.  She is still c/o some shortness of breath but states it is better 

overall.  She denies cough, fever, chills.





Objective





- Vital Signs


Vital signs: 


 Vital Signs











Temp  97.9 F   03/20/17 07:00


 


Pulse  76   03/20/17 12:00


 


Resp  18   03/20/17 11:42


 


BP  145/63   03/20/17 07:00


 


Pulse Ox  90 L  03/20/17 07:00








 Intake & Output











 03/19/17 03/20/17 03/20/17





 18:59 06:59 18:59


 


Intake Total 340  


 


Balance 340  


 


Weight  69.5 kg 


 


Intake:   


 


  Oral 340  


 


Other:   


 


  Voiding Method  Toilet Toilet


 


  # Voids 1 2 














- Exam





Gen: A+OX3, NAD


CV: RRR, s1/s2


Lungs: diminished, otherwise clear


Abd: soft, NT/ND, +BS


Ext: no edema





- Labs


CBC & Chem 7: 


 03/20/17 08:10





 03/20/17 08:10


Labs: 


 Abnormal Lab Results - Last 24 Hours (Table)











  03/19/17 03/19/17 03/20/17 Range/Units





  16:33 20:55 07:48 


 


RBC     (3.80-5.40)  m/uL


 


Hgb     (11.4-16.0)  gm/dL


 


Hct     (34.0-46.0)  %


 


RDW     (11.5-15.5)  %


 


Lymphocytes # (Manual)     (1.0-4.8)  k/uL


 


Chloride     ()  mmol/L


 


Carbon Dioxide     (22-30)  mmol/L


 


BUN     (7-17)  mg/dL


 


Creatinine     (0.52-1.04)  mg/dL


 


Glucose     (74-99)  mg/dL


 


POC Glucose (mg/dL)  172 H  147 H  130 H  (75-99)  mg/dL














  03/20/17 03/20/17 03/20/17 Range/Units





  08:10 08:10 12:25 


 


RBC  3.17 L    (3.80-5.40)  m/uL


 


Hgb  8.3 L    (11.4-16.0)  gm/dL


 


Hct  26.7 L    (34.0-46.0)  %


 


RDW  17.7 H    (11.5-15.5)  %


 


Lymphocytes # (Manual)  0.1 L    (1.0-4.8)  k/uL


 


Chloride   96 L   ()  mmol/L


 


Carbon Dioxide   32 H   (22-30)  mmol/L


 


BUN   49 H   (7-17)  mg/dL


 


Creatinine   1.25 H   (0.52-1.04)  mg/dL


 


Glucose   111 H   (74-99)  mg/dL


 


POC Glucose (mg/dL)    204 H  (75-99)  mg/dL














Assessment and Plan


Plan: 





Sepsis


AECOPD


E Coli UTI POA


AECHF, diastolic


AFib with CVR


CKD 3


Severe pulmonary hypertension


Anemia


CAD


Valvular heart disease





O2 to maintain sat > or = to 88%


ABX: Rocephidonato Tomlinson Pulmicort


IS and pulmonary hygiene


Change Solumedrol to Prednisone today


Singulair


GI and DVT prophylaxis


Ok to DC from pulmonary standpoint


Outpatient follow up in 1-2 weeks

## 2017-04-04 ENCOUNTER — HOSPITAL ENCOUNTER (INPATIENT)
Dept: HOSPITAL 47 - EC | Age: 72
LOS: 4 days | Discharge: HOME | DRG: 871 | End: 2017-04-08
Payer: MEDICARE

## 2017-04-04 VITALS — BODY MASS INDEX: 25.1 KG/M2

## 2017-04-04 DIAGNOSIS — I25.10: ICD-10-CM

## 2017-04-04 DIAGNOSIS — Z88.2: ICD-10-CM

## 2017-04-04 DIAGNOSIS — M51.36: ICD-10-CM

## 2017-04-04 DIAGNOSIS — E78.5: ICD-10-CM

## 2017-04-04 DIAGNOSIS — I13.0: ICD-10-CM

## 2017-04-04 DIAGNOSIS — M46.90: ICD-10-CM

## 2017-04-04 DIAGNOSIS — A41.9: Primary | ICD-10-CM

## 2017-04-04 DIAGNOSIS — Z91.040: ICD-10-CM

## 2017-04-04 DIAGNOSIS — Z99.81: ICD-10-CM

## 2017-04-04 DIAGNOSIS — I27.2: ICD-10-CM

## 2017-04-04 DIAGNOSIS — J44.1: ICD-10-CM

## 2017-04-04 DIAGNOSIS — J44.0: ICD-10-CM

## 2017-04-04 DIAGNOSIS — F17.200: ICD-10-CM

## 2017-04-04 DIAGNOSIS — K21.9: ICD-10-CM

## 2017-04-04 DIAGNOSIS — D68.9: ICD-10-CM

## 2017-04-04 DIAGNOSIS — Z95.5: ICD-10-CM

## 2017-04-04 DIAGNOSIS — N17.9: ICD-10-CM

## 2017-04-04 DIAGNOSIS — Z80.1: ICD-10-CM

## 2017-04-04 DIAGNOSIS — I50.32: ICD-10-CM

## 2017-04-04 DIAGNOSIS — K57.30: ICD-10-CM

## 2017-04-04 DIAGNOSIS — K29.60: ICD-10-CM

## 2017-04-04 DIAGNOSIS — J45.909: ICD-10-CM

## 2017-04-04 DIAGNOSIS — Z82.49: ICD-10-CM

## 2017-04-04 DIAGNOSIS — D50.9: ICD-10-CM

## 2017-04-04 DIAGNOSIS — I48.0: ICD-10-CM

## 2017-04-04 DIAGNOSIS — J98.11: ICD-10-CM

## 2017-04-04 DIAGNOSIS — I49.3: ICD-10-CM

## 2017-04-04 DIAGNOSIS — I73.9: ICD-10-CM

## 2017-04-04 DIAGNOSIS — Z79.82: ICD-10-CM

## 2017-04-04 DIAGNOSIS — I34.1: ICD-10-CM

## 2017-04-04 DIAGNOSIS — N18.3: ICD-10-CM

## 2017-04-04 DIAGNOSIS — Z88.5: ICD-10-CM

## 2017-04-04 DIAGNOSIS — K92.2: ICD-10-CM

## 2017-04-04 DIAGNOSIS — I25.5: ICD-10-CM

## 2017-04-04 DIAGNOSIS — J96.01: ICD-10-CM

## 2017-04-04 DIAGNOSIS — D62: ICD-10-CM

## 2017-04-04 DIAGNOSIS — Z86.73: ICD-10-CM

## 2017-04-04 DIAGNOSIS — Z79.899: ICD-10-CM

## 2017-04-04 DIAGNOSIS — M43.10: ICD-10-CM

## 2017-04-04 DIAGNOSIS — N28.1: ICD-10-CM

## 2017-04-04 LAB
ALP SERPL-CCNC: 69 U/L (ref 38–126)
ALT SERPL-CCNC: 1293 U/L (ref 9–52)
ANION GAP SERPL CALC-SCNC: 10 MMOL/L
APTT BLD: 27.1 SEC (ref 22–30)
AST SERPL-CCNC: 1301 U/L (ref 14–36)
BUN SERPL-SCNC: 54 MG/DL (ref 7–17)
CALCIUM SPEC-MCNC: 7.8 MG/DL (ref 8.4–10.2)
CELLS COUNTED: 100
CH: 24.5
CHCM: 31.3
CHLORIDE SERPL-SCNC: 99 MMOL/L (ref 98–107)
CK SERPL-CCNC: 49 U/L (ref 30–135)
CO2 SERPL-SCNC: 30 MMOL/L (ref 22–30)
ERYTHROCYTE [DISTWIDTH] IN BLOOD BY AUTOMATED COUNT: 2.77 M/UL (ref 3.8–5.4)
ERYTHROCYTE [DISTWIDTH] IN BLOOD: 19.5 % (ref 11.5–15.5)
GLUCOSE BLD-MCNC: 133 MG/DL (ref 75–99)
GLUCOSE SERPL-MCNC: 120 MG/DL (ref 74–99)
HCT VFR BLD AUTO: 21.5 % (ref 34–46)
HDW: 3.97
HGB BLD-MCNC: 6.8 GM/DL (ref 11.4–16)
INR PPP: 1.7 (ref ?–1.1)
MCH RBC QN AUTO: 24.6 PG (ref 25–35)
MCHC RBC AUTO-ENTMCNC: 31.7 G/DL (ref 31–37)
MCV RBC AUTO: 77.6 FL (ref 80–100)
NON-AFRICAN AMERICAN GFR(MDRD): 35
POTASSIUM SERPL-SCNC: 4.4 MMOL/L (ref 3.5–5.1)
PROT SERPL-MCNC: 5.1 G/DL (ref 6.3–8.2)
PT BLD: 16.4 SEC (ref 9–12)
SODIUM SERPL-SCNC: 139 MMOL/L (ref 137–145)
TROPONIN I SERPL-MCNC: 0.27 NG/ML (ref 0–0.03)
WBC # BLD AUTO: 17.4 K/UL (ref 3.8–10.6)
WBC (PEROX): 16.97

## 2017-04-04 PROCEDURE — 83735 ASSAY OF MAGNESIUM: CPT

## 2017-04-04 PROCEDURE — 82272 OCCULT BLD FECES 1-3 TESTS: CPT

## 2017-04-04 PROCEDURE — 93005 ELECTROCARDIOGRAM TRACING: CPT

## 2017-04-04 PROCEDURE — 96361 HYDRATE IV INFUSION ADD-ON: CPT

## 2017-04-04 PROCEDURE — 86920 COMPATIBILITY TEST SPIN: CPT

## 2017-04-04 PROCEDURE — 71260 CT THORAX DX C+: CPT

## 2017-04-04 PROCEDURE — 74177 CT ABD & PELVIS W/CONTRAST: CPT

## 2017-04-04 PROCEDURE — 82105 ALPHA-FETOPROTEIN SERUM: CPT

## 2017-04-04 PROCEDURE — 83605 ASSAY OF LACTIC ACID: CPT

## 2017-04-04 PROCEDURE — 83520 IMMUNOASSAY QUANT NOS NONAB: CPT

## 2017-04-04 PROCEDURE — 81003 URINALYSIS AUTO W/O SCOPE: CPT

## 2017-04-04 PROCEDURE — 85025 COMPLETE CBC W/AUTO DIFF WBC: CPT

## 2017-04-04 PROCEDURE — 86901 BLOOD TYPING SEROLOGIC RH(D): CPT

## 2017-04-04 PROCEDURE — 85610 PROTHROMBIN TIME: CPT

## 2017-04-04 PROCEDURE — 80306 DRUG TEST PRSMV INSTRMNT: CPT

## 2017-04-04 PROCEDURE — 83516 IMMUNOASSAY NONANTIBODY: CPT

## 2017-04-04 PROCEDURE — 86900 BLOOD TYPING SEROLOGIC ABO: CPT

## 2017-04-04 PROCEDURE — 96365 THER/PROPH/DIAG IV INF INIT: CPT

## 2017-04-04 PROCEDURE — 94640 AIRWAY INHALATION TREATMENT: CPT

## 2017-04-04 PROCEDURE — 99291 CRITICAL CARE FIRST HOUR: CPT

## 2017-04-04 PROCEDURE — 96375 TX/PRO/DX INJ NEW DRUG ADDON: CPT

## 2017-04-04 PROCEDURE — 36415 COLL VENOUS BLD VENIPUNCTURE: CPT

## 2017-04-04 PROCEDURE — 76700 US EXAM ABDOM COMPLETE: CPT

## 2017-04-04 PROCEDURE — 85730 THROMBOPLASTIN TIME PARTIAL: CPT

## 2017-04-04 PROCEDURE — 96366 THER/PROPH/DIAG IV INF ADDON: CPT

## 2017-04-04 PROCEDURE — 86255 FLUORESCENT ANTIBODY SCREEN: CPT

## 2017-04-04 PROCEDURE — 84100 ASSAY OF PHOSPHORUS: CPT

## 2017-04-04 PROCEDURE — 84484 ASSAY OF TROPONIN QUANT: CPT

## 2017-04-04 PROCEDURE — 82550 ASSAY OF CK (CPK): CPT

## 2017-04-04 PROCEDURE — 86225 DNA ANTIBODY NATIVE: CPT

## 2017-04-04 PROCEDURE — 94760 N-INVAS EAR/PLS OXIMETRY 1: CPT

## 2017-04-04 PROCEDURE — 87086 URINE CULTURE/COLONY COUNT: CPT

## 2017-04-04 PROCEDURE — 80074 ACUTE HEPATITIS PANEL: CPT

## 2017-04-04 PROCEDURE — 82553 CREATINE MB FRACTION: CPT

## 2017-04-04 PROCEDURE — 80053 COMPREHEN METABOLIC PANEL: CPT

## 2017-04-04 PROCEDURE — 71010: CPT

## 2017-04-04 PROCEDURE — 96367 TX/PROPH/DG ADDL SEQ IV INF: CPT

## 2017-04-04 PROCEDURE — 86850 RBC ANTIBODY SCREEN: CPT

## 2017-04-04 PROCEDURE — 82248 BILIRUBIN DIRECT: CPT

## 2017-04-04 PROCEDURE — 80048 BASIC METABOLIC PNL TOTAL CA: CPT

## 2017-04-04 PROCEDURE — 86162 COMPLEMENT TOTAL (CH50): CPT

## 2017-04-04 RX ADMIN — METHYLPREDNISOLONE SODIUM SUCCINATE SCH MG: 125 INJECTION, POWDER, FOR SOLUTION INTRAMUSCULAR; INTRAVENOUS at 20:57

## 2017-04-04 RX ADMIN — METOPROLOL TARTRATE SCH MG: 25 TABLET, FILM COATED ORAL at 23:08

## 2017-04-04 RX ADMIN — Medication SCH MG: at 23:08

## 2017-04-04 RX ADMIN — BUDESONIDE SCH: 0.5 INHALANT ORAL at 21:04

## 2017-04-04 RX ADMIN — OXYCODONE HYDROCHLORIDE SCH: 20 TABLET, FILM COATED, EXTENDED RELEASE ORAL at 17:25

## 2017-04-04 RX ADMIN — METHYLPREDNISOLONE SODIUM SUCCINATE SCH MG: 125 INJECTION, POWDER, FOR SOLUTION INTRAMUSCULAR; INTRAVENOUS at 23:08

## 2017-04-04 RX ADMIN — OXYCODONE HYDROCHLORIDE SCH MG: 20 TABLET, FILM COATED, EXTENDED RELEASE ORAL at 18:52

## 2017-04-04 RX ADMIN — MONTELUKAST SODIUM SCH MG: 10 TABLET, FILM COATED ORAL at 23:08

## 2017-04-04 RX ADMIN — SUCRALFATE SCH GM: 1 TABLET ORAL at 18:51

## 2017-04-04 NOTE — ED
General Adult HPI





- General


Chief complaint: GI Bleed


Stated complaint: MISSED BLOOD TRANSFUSION YESTERDAY


Time Seen by Provider: 17 12:15


Source: patient, family


Mode of arrival: wheelchair


Limitations: no limitations





- History of Present Illness


Initial comments: 





This 71-year-old white female presents with a complaint of black stools.  She 

apparently has had black stools for approximately one week.  She relates a 

history of just being discharged from the hospital approximately 2 weeks ago 

after being hospitalized for a GI bleed.  Family relates that they're unable to 

definitively determine the cause of the bleeding at that time.  She apparently 

had to swallow a camera and this did not show any bleeding.  Her black stools 

apparently stopped.  They've been doing well for approximately one week until 

they reoccurred.  She is on health was for her atrial fibrillation.  She denies 

any bright red blood per rectum.  She does complain of some shortness of breath 

and does have a history of COPD and has been wheezing.  His been no known 

fevers or chills.  They further relate that she's had some hemoptysis with 

phlegm-like production present for the last 3 days.  And appears very pale per 

family.  No other complaints or modifying factors.  She does present with a 

pulse ox of 80% on room air.





- Related Data


 Home Medications











 Medication  Instructions  Recorded  Confirmed


 


Montelukast Sodium [Singulair] 10 mg PO HS 14


 


ALPRAZolam [Xanax] 0.5 mg PO HS 12/05/15 04/04/17


 


Apixaban [Eliquis] 2.5 mg PO BID 12/05/15 04/04/17


 


Metoprolol Tartrate [Lopressor] 25 mg PO BID 12/05/15 04/04/17


 


Nitroglycerin Sl Tabs [Nitrostat] 0.4 mg SUBLINGUAL Q5M PRN 12/05/15 04/04/17


 


Atorvastatin [Lipitor] 40 mg PO HS 12/06/15 04/04/17


 


Ergocalciferol [Vitamin D2 50,000 unit PO Q7D 03/15/17 04/04/17





(DRISDOL)]   


 


Ipratropium-Albuterol Nebulize 3 ml INHALATION RT-QID PRN 03/15/17 04/04/17





[Duoneb 0.5 mg-3 mg/3 ml Soln]   


 


Melatonin 3 mg PO HS 03/15/17 04/04/17


 


Multivitamins, Thera [Multivitamin 1 tab PO DAILY 03/15/17 04/04/17





(formulary)]   


 


Omeprazole 40 mg PO DAILY 03/15/17 04/04/17


 


amLODIPine [Norvasc] 10 mg PO DAILY 03/15/17 04/04/17


 


hydrALAZINE HCL [Apresoline] 75 mg PO BID 03/15/17 04/04/17


 


oxyCODONE HCL 20 mg PO TID 03/15/17 04/04/17


 


Aspirin 81 mg PO DAILY 17


 


Furosemide [Lasix] 40 mg PO DAILY 17


 


Losartan Potassium [Cozaar] 100 mg PO DAILY 17


 


Potassium Chloride ER [K-Dur 20] 20 meq PO DAILY 17








 Previous Rx's











 Medication  Instructions  Recorded


 


Sucralfate [Carafate] 1 gm PO BID #600 ml 17











 Allergies











Allergy/AdvReac Type Severity Reaction Status Date / Time


 


diphenhydramine Allergy  Unknown Verified 17 12:30





[From Benadryl]     


 


latex Allergy  Unknown Verified 17 12:31


 


morphine Allergy  Unknown Verified 17 12:30


 


pregabalin [From Lyrica] Allergy  Unknown Verified 17 12:30


 


Sulfa (Sulfonamide Allergy  Unknown Verified 17 12:30





Antibiotics)     














Review of Systems


ROS Statement: 


Those systems with pertinent positive or pertinent negative responses have been 

documented in the HPI.





ROS Other: All systems not noted in ROS Statement are negative.





Past Medical History


Past Medical History: Atrial Fibrillation, Asthma, Coronary Artery Disease (CAD)

, COPD, CVA/TIA, GERD/Reflux, GI Bleed, Hyperlipidemia, Hypertension, Mitral 

Valve Prolapse (MVP), Musculoskeletal Disorder, Osteoarthritis (OA), 

Respiratory Disorder, Vascular Disorder


Additional Past Medical History / Comment(s): diverticulitits


Last Myocardial Infarction Date:: unknown


History of Any Multi-Drug Resistant Organisms: None Reported


Past Surgical History: Cholecystectomy, Heart Catheterization With Stent, 

Hysterectomy, Orthopedic Surgery


Additional Past Surgical History / Comment(s): carotid


Past Anesthesia/Blood Transfusion Reactions: No Reported Reaction


Date of Last Stent Placement:: 


Past Psychological History: No Psychological Hx Reported


Smoking Status: Current some day smoker


Past Alcohol Use History: None Reported


Past Drug Use History: None Reported





- Past Family History


  ** Son(s)


Family Medical History: No Reported History (Patient has one son no major 

medical problems.)





  ** Daughter(s)


Family Medical History: No Reported History (Patient has one daughter no major 

problems.)





  ** Mother


Family Medical History: Congestive Heart Failure (CHF) (Mother  from 

congestive heart failure.)





  ** Father


Family Medical History: Cancer (Father  at age 73 from lung cancer.)


Additional Family Medical History / Comment(s): Bone ca





  ** Sister(s)


Family Medical History: Coronary Artery Disease (CAD) (Patient has 3 sisters 

one of them was CAD.)





  ** Brother(s)


Family Medical History: Coronary Artery Disease (CAD) (Patient has one brother 

with CAD post MI.  And renal failure.), Myocardial Infarction (MI)





General Exam





- General Exam Comments


Initial Comments: 





GENERAL: The patient is well nourished and well hydrated. 


VITAL SIGNS: Heart rate, blood pressure, respiratory rate reviewed as recorded 

in nurse's notes. 


EYES: Pupils are round and reactive. Extraocular movements are intact. No 

conjunctival / lid redness or swelling. 


ENT: No external evidence of injury, swelling, or ecchymosis. Airway is patent. 

Throat is clear. 


NECK: Nontender. No swelling or evidence of injury. No subcutaneous emphysema. 

Trachea is midline. No thyroid mass. 


HEART: Regular rate and rhythm. Good peripheral pulses. 


LUNGS/CHEST: Wheezing is noted to bilateral chest.  No ecchymosis, subcutaneous 

emphysema, or tenderness. 


ABDOMEN: Abdomen soft without tenderness. No palpable masses or organomegaly. 

No peritoneal signs. No abdominal wall swelling or ecchymosis. 


EXTREMITIES: There is mild lower extremity edema.  Normal muscle tone and 

function. No thoracolumbar tenderness. 


NEUROLOGIC: Sensation is grossly intact. Cranial nerve exam reveals face is 

symmetrical, tongue is midline, speech is clear. 


SKIN: No abrasions or ecchymosis is noted. No induration or masses noted. 


PSYCHIATRIC: Alert and oriented. Appropriate behavior and judgment.


Rectal exam: There are mild nonbleeding external hemorrhoids noted.  Stool is 

black.





Limitations: no limitations





Course





 Vital Signs











  17





  11:23 12:30 12:42


 


Temperature 97.8 F  


 


Pulse Rate 96 87 88


 


Respiratory 18 18 17





Rate   


 


Blood Pressure 111/54 108/53 113/51


 


O2 Sat by Pulse 80 L 93 L 94 L





Oximetry   














  17





  13:12 13:25 13:42


 


Temperature   


 


Pulse Rate 90 78 88


 


Respiratory 16  16





Rate   


 


Blood Pressure 91/53  113/54


 


O2 Sat by Pulse 94 L  95





Oximetry   














  17





  13:48


 


Temperature 


 


Pulse Rate 78


 


Respiratory 





Rate 


 


Blood Pressure 


 


O2 Sat by Pulse 





Oximetry 














Medical Decision Making





- Medical Decision Making





The patient is seen and examined.  All diagnostics are reviewed.  The EKG shows 

atrial fibrillation with occasional PVC noted.  Heart rate is 93.  There is no 

acute ST-T wave changes identified.  The QRS duration is 80 and the QTc 

interval is 467.  The chest x-ray was completed and does show a worsening 

infiltrate in the left upper lobe.  Radiologist suspects a small left pleural 

effusion as well.  Laboratory shows an elevated lactic acid at 2.2, hemoglobin 

of 6.8, a creatinine elevated at 1.45 which is minimally increased as compared 

to previous.  The liver function studies are significantly elevated including 

the AST and ALT.  The white blood cell count is elevated at 17.4.  Her pulse ox 

was 80% on arrival but doesn't improve with oxygen.  She is given a DuoNeb 

breathing treatment as well.  Is given IV steroids is L as IV Levaquin and 

Zosyn plus being treated for a hospital acquired pneumonia.  It is felt as 

though she does have a GI bleed as her Hemoccult came back positive and her 

hemoglobin is quite low at 6.8.  It is felt as though she has multiple acute 

conditions and is quite severely ill and would require ICU admission.  The case 

is discussed with Dr. Hood and he presents to the ER and evaluates the patient 

and does recommend admission as well with ICU to consult.  Case was discussed 

with Dr. Mendez and she will be consulted for pulmonology and critical care 

management.  Approximately 40 minutes of critical care time was utilized and 

the treatment of the patient.  2 units are ordered of packed red blood cells 

for transfusion.





- Lab Data


Result diagrams: 


 17 12:41





 17 12:41





 Lab Results











  17 Range/Units





  12:41 12:41 12:41 


 


WBC    17.4 H  (3.8-10.6)  k/uL


 


RBC    2.77 L  (3.80-5.40)  m/uL


 


Hgb    6.8 L* D  (11.4-16.0)  gm/dL


 


Hct    21.5 L  (34.0-46.0)  %


 


MCV    77.6 L D  (80.0-100.0)  fL


 


MCH    24.6 L  (25.0-35.0)  pg


 


MCHC    31.7  (31.0-37.0)  g/dL


 


RDW    19.5 H  (11.5-15.5)  %


 


Plt Count    148 L  (150-450)  k/uL


 


Neutrophils % (Manual)    91.0  %


 


Band Neutrophils %    1.0  %


 


Lymphocytes % (Manual)    3.0  %


 


Monocytes % (Manual)    5.0  %


 


Neutrophils # (Manual)    16.0 H  (1.3-7.7)  k/uL


 


Lymphocytes # (Manual)    0.5 L  (1.0-4.8)  k/uL


 


Monocytes # (Manual)    0.9  (0-1.0)  k/uL


 


Nucleated RBCs    0  (0-0)  /100 WBC


 


Manual Slide Review    Performed  


 


Hypochromasia    Marked  


 


Poikilocytosis    Slight  


 


Anisocytosis    Slight  


 


Microcytosis    Moderate  


 


PT  16.4 H    (9.0-12.0)  sec


 


INR  1.7    (<1.1)  


 


APTT  27.1    (22.0-30.0)  sec


 


Sodium     (137-145)  mmol/L


 


Potassium     (3.5-5.1)  mmol/L


 


Chloride     ()  mmol/L


 


Carbon Dioxide     (22-30)  mmol/L


 


Anion Gap     mmol/L


 


BUN     (7-17)  mg/dL


 


Creatinine     (0.52-1.04)  mg/dL


 


Est GFR (MDRD) Af Amer     (>60 ml/min/1.73 sqM)  


 


Est GFR (MDRD) Non-Af     (>60 ml/min/1.73 sqM)  


 


Glucose     (74-99)  mg/dL


 


Plasma Lactic Acid [place]     (0.7-2.0)  mmol/L


 


Calcium     (8.4-10.2)  mg/dL


 


Total Bilirubin     (0.2-1.3)  mg/dL


 


AST     (14-36)  U/L


 


ALT     (9-52)  U/L


 


Alkaline Phosphatase     ()  U/L


 


Total Creatine Kinase   49   ()  U/L


 


CK-MB (CK-2)   1.1   (0.0-2.4)  ng/mL


 


CK-MB (CK-2) Rel Index   2.2   


 


Troponin I   0.267 H*   (0.000-0.034)  ng/mL


 


Total Protein     (6.3-8.2)  g/dL


 


Albumin     (3.5-5.0)  g/dL


 


Stool Occult Blood     (Negative)  


 


Blood Type     


 


Blood Type Recheck     


 


Antibody Screen     


 


Spec Expiration Date     














  17 Range/Units





  12:41 12:41 12:41 


 


WBC     (3.8-10.6)  k/uL


 


RBC     (3.80-5.40)  m/uL


 


Hgb     (11.4-16.0)  gm/dL


 


Hct     (34.0-46.0)  %


 


MCV     (80.0-100.0)  fL


 


MCH     (25.0-35.0)  pg


 


MCHC     (31.0-37.0)  g/dL


 


RDW     (11.5-15.5)  %


 


Plt Count     (150-450)  k/uL


 


Neutrophils % (Manual)     %


 


Band Neutrophils %     %


 


Lymphocytes % (Manual)     %


 


Monocytes % (Manual)     %


 


Neutrophils # (Manual)     (1.3-7.7)  k/uL


 


Lymphocytes # (Manual)     (1.0-4.8)  k/uL


 


Monocytes # (Manual)     (0-1.0)  k/uL


 


Nucleated RBCs     (0-0)  /100 WBC


 


Manual Slide Review     


 


Hypochromasia     


 


Poikilocytosis     


 


Anisocytosis     


 


Microcytosis     


 


PT     (9.0-12.0)  sec


 


INR     (<1.1)  


 


APTT     (22.0-30.0)  sec


 


Sodium  139    (137-145)  mmol/L


 


Potassium  4.4    (3.5-5.1)  mmol/L


 


Chloride  99    ()  mmol/L


 


Carbon Dioxide  30    (22-30)  mmol/L


 


Anion Gap  10    mmol/L


 


BUN  54 H    (7-17)  mg/dL


 


Creatinine  1.45 H    (0.52-1.04)  mg/dL


 


Est GFR (MDRD) Af Amer  43    (>60 ml/min/1.73 sqM)  


 


Est GFR (MDRD) Non-Af  35    (>60 ml/min/1.73 sqM)  


 


Glucose  120 H    (74-99)  mg/dL


 


Plasma Lactic Acid [place]   2.2 H*   (0.7-2.0)  mmol/L


 


Calcium  7.8 L    (8.4-10.2)  mg/dL


 


Total Bilirubin  1.3    (0.2-1.3)  mg/dL


 


AST  1301 H    (14-36)  U/L


 


ALT  1293 H    (9-52)  U/L


 


Alkaline Phosphatase  69    ()  U/L


 


Total Creatine Kinase     ()  U/L


 


CK-MB (CK-2)     (0.0-2.4)  ng/mL


 


CK-MB (CK-2) Rel Index     


 


Troponin I     (0.000-0.034)  ng/mL


 


Total Protein  5.1 L    (6.3-8.2)  g/dL


 


Albumin  2.8 L    (3.5-5.0)  g/dL


 


Stool Occult Blood     (Negative)  


 


Blood Type    O Negative  


 


Blood Type Recheck    No  


 


Antibody Screen    NEGATIVE  


 


Spec Expiration Date    2017 - 23 Range/Units





  13:10 


 


WBC   (3.8-10.6)  k/uL


 


RBC   (3.80-5.40)  m/uL


 


Hgb   (11.4-16.0)  gm/dL


 


Hct   (34.0-46.0)  %


 


MCV   (80.0-100.0)  fL


 


MCH   (25.0-35.0)  pg


 


MCHC   (31.0-37.0)  g/dL


 


RDW   (11.5-15.5)  %


 


Plt Count   (150-450)  k/uL


 


Neutrophils % (Manual)   %


 


Band Neutrophils %   %


 


Lymphocytes % (Manual)   %


 


Monocytes % (Manual)   %


 


Neutrophils # (Manual)   (1.3-7.7)  k/uL


 


Lymphocytes # (Manual)   (1.0-4.8)  k/uL


 


Monocytes # (Manual)   (0-1.0)  k/uL


 


Nucleated RBCs   (0-0)  /100 WBC


 


Manual Slide Review   


 


Hypochromasia   


 


Poikilocytosis   


 


Anisocytosis   


 


Microcytosis   


 


PT   (9.0-12.0)  sec


 


INR   (<1.1)  


 


APTT   (22.0-30.0)  sec


 


Sodium   (137-145)  mmol/L


 


Potassium   (3.5-5.1)  mmol/L


 


Chloride   ()  mmol/L


 


Carbon Dioxide   (22-30)  mmol/L


 


Anion Gap   mmol/L


 


BUN   (7-17)  mg/dL


 


Creatinine   (0.52-1.04)  mg/dL


 


Est GFR (MDRD) Af Amer   (>60 ml/min/1.73 sqM)  


 


Est GFR (MDRD) Non-Af   (>60 ml/min/1.73 sqM)  


 


Glucose   (74-99)  mg/dL


 


Plasma Lactic Acid [place]   (0.7-2.0)  mmol/L


 


Calcium   (8.4-10.2)  mg/dL


 


Total Bilirubin   (0.2-1.3)  mg/dL


 


AST   (14-36)  U/L


 


ALT   (9-52)  U/L


 


Alkaline Phosphatase   ()  U/L


 


Total Creatine Kinase   ()  U/L


 


CK-MB (CK-2)   (0.0-2.4)  ng/mL


 


CK-MB (CK-2) Rel Index   


 


Troponin I   (0.000-0.034)  ng/mL


 


Total Protein   (6.3-8.2)  g/dL


 


Albumin   (3.5-5.0)  g/dL


 


Stool Occult Blood  Positive H  (Negative)  


 


Blood Type   


 


Blood Type Recheck   


 


Antibody Screen   


 


Spec Expiration Date   














Disposition


Clinical Impression: 


 Melena, Hemorrhoids, Pneumonia, Respiratory failure, COPD (chronic obstructive 

pulmonary disease), Acute GI bleeding, Elevated troponin, Acute blood loss 

anemia, Stool guaiac positive, Hemoptysis, Weakness, Leukocytosis, Transaminitis





Disposition: ADMITTED AS IP TO THIS [facility] [place]


Condition: Critical


Time of Disposition: 15:18


Decision Date: 17


Decision Time: 15:23

## 2017-04-04 NOTE — P.HPIM
History of Present Illness


H&P Date: 17


Chief Complaint: Hemoptysis/left upper lobe pneumonia.





This is a 77-year-old  female one of my patient with a previous 

medical history significant for CAD post-PCI of the LAD, hypertension and 

hypertensive cardio vascular disease with left ventricular hypertrophy, 

hyperlipidemia, history of the for arterial occlusive disease, carotid artery 

disease status post right carotid endarterectomy with about 70% stenosis of 

bilateral carotid arteries, has been under the care of cardiology, pulmonary 

medicine, vascular surgery, gastroenterology, she was recently hospitalized at 

[facility] in 2017 after she was admitted for acute 

respiratory failure secondary to acute diastolic heart failure and was seen and 

evaluated by pulmonary and cardiology along with gastroenterology at that time 

she was given 2 units of packed red blood cells and the patient was sent home 

as a matter fact I saw the patient Rocephin the office about a week ago and she 

was feeling a bit better with increased swelling in both lower extremities she 

was placed on Lasix along with potassium supplement, she had laboratory 

evaluation that showed significant anemia patient was scheduled to go for blood 

transfusion yesterday patient was out of it completely and she did not call for 

the transfer, her daughter was with her the bedside and she stated that nobody 

called her to tell her that she needed to go to have a blood transfusion, 

patient was quite out of it yesterday and she has not been eating much and she 

has not been taking her medicine over the last 24 hours today patient felt 

extremely weak and she was spitting up blood over the last 48 hours she ended 

up coming to the ER and she was seen in the ER and her hemoglobin was 6.8, and 

patient was found to have a left upper lobe of 100 along with hemoptysis she 

was admitted to the intensive care unit because she was hypotensive for acute 

respiratory failure secondary to left upper lobe pneumonia/hemoptysis with the 

acute exacerbation of COPD.





Review of Systems


Constitutional: Reports chronic headaches, Reports chronic pain, Reports 

lethargy, Reports malaise, Reports weakness, Reports weight loss


Eyes: bilateral blurred vision, bilateral decreased vision, denies bulging eye


Ears: bilateral: decreased hearing


Ears, nose, mouth and throat: Denies dysphagia, Denies neck lump, Denies 

swelling in throat, Denies sore throat


Cardiovascular: Reports claudication, Reports decreased exercise tolerance, 

Reports dyspnea on exertion, Reports high blood pressure, Reports irregular 

heart beat, Reports lightheadedness, Reports orthopnea, Reports palpitations, 

Reports rapid heart beat, Reports shortness of breath, Denies chest pain, 

Denies paroxysmal nocturnal dyspnea, Denies phlebitis


Respiratory: Reports congestion, Reports cough, Reports cough with sputum, 

Reports dyspnea, Reports home oxygen, Reports respiratory infections, Reports 

wheezing, Denies sleep apnea, Denies snoring


Gastrointestinal: Reports constipation, Reports hematemesis, Reports loss of 

appetite, Reports melena, Reports nausea, Denies abdominal pain, Denies bloating

, Denies BRBPR, Denies change in bowel habits, Denies coffee ground emesis, 

Denies excessive gas, Denies heartburn, Denies hematochezia, Denies indigestion

, Denies lactose intolerance, Denies vomiting


Genitourinary: Reports stress incontinence, Denies dysuria


Menstruation: Reports post hysterectomy, Reports postmenopausal


Musculoskeletal: Reports frequent falls, Reports gait dysfunction, Reports low 

back pain, Reports shooting leg pain, Denies myalgias


Musculoskeletal: bilateral: ankle swelling, foot pain, foot stiffness, foot 

swelling, absent: ankle pain, ankle stiffness, elbow pain, elbow stiffness, 

elbow swelling, hand pain, hand stiffness, hand swelling, hip pain, hip 

stiffness, hip swelling, knee pain, knee stiffness, knee swelling, shoulder pain

, shoulder stiffness, shoulder swelling, wrist pain, wrist stiffness, wrist 

swelling


Integumentary: Denies pruritus, Denies rash


Neurological: Reports confusion, Reports gait dysfunction, Reports memory loss, 

Reports numbness, Reports paresthesias, Reports weakness


Psychiatric: Reports anxiety, Reports confusion, Reports depression, Denies 

sadness/tearfulness, Denies sleep disturbances, Denies suicidal ideation


Endocrine: Denies fatigue, Denies weight change





Past Medical History


Past Medical History: Atrial Fibrillation, Asthma, Coronary Artery Disease (CAD)

, COPD, CVA/TIA, GERD/Reflux, GI Bleed, Hyperlipidemia, Hypertension, Mitral 

Valve Prolapse (MVP), Musculoskeletal Disorder, Osteoarthritis (OA), 

Respiratory Disorder, Vascular Disorder


Additional Past Medical History / Comment(s): diverticulitits


Last Myocardial Infarction Date:: unknown


History of Any Multi-Drug Resistant Organisms: None Reported


Past Surgical History: Cholecystectomy, Heart Catheterization With Stent, 

Hysterectomy, Orthopedic Surgery


Additional Past Surgical History / Comment(s): carotid


Past Anesthesia/Blood Transfusion Reactions: No Reported Reaction


Date of Last Stent Placement:: 


Past Psychological History: No Psychological Hx Reported


Smoking Status: Current some day smoker


Past Alcohol Use History: None Reported


Past Drug Use History: None Reported





- Past Family History


  ** Son(s)


Family Medical History: No Reported History (Patient has one son no major 

medical problems.)





  ** Daughter(s)


Family Medical History: No Reported History (Patient has one daughter no major 

problems.)





  ** Mother


Family Medical History: Congestive Heart Failure (CHF) (Mother  from 

congestive heart failure.)





  ** Father


Family Medical History: Cancer (Father  at age 73 from lung cancer.)


Additional Family Medical History / Comment(s): Bone ca





  ** Sister(s)


Family Medical History: Coronary Artery Disease (CAD) (Patient has 3 sisters 

one of them was CAD.)





  ** Brother(s)


Family Medical History: Coronary Artery Disease (CAD) (Patient has one brother 

with CAD post MI.  And renal failure.), Myocardial Infarction (MI)





Medications and Allergies


 Home Medications











 Medication  Instructions  Recorded  Confirmed  Type


 


Montelukast Sodium [Singulair] 10 mg PO HS 14 History


 


ALPRAZolam [Xanax] 0.5 mg PO HS 12/05/15 04/04/17 History


 


Apixaban [Eliquis] 2.5 mg PO BID 12/05/15 04/04/17 History


 


Metoprolol Tartrate [Lopressor] 25 mg PO BID 12/05/15 04/04/17 History


 


Nitroglycerin Sl Tabs [Nitrostat] 0.4 mg SUBLINGUAL Q5M PRN 12/05/15 04/04/17 

History


 


Atorvastatin [Lipitor] 40 mg PO HS 12/06/15 04/04/17 History


 


Ergocalciferol [Vitamin D2 50,000 unit PO Q7D 03/15/17 04/04/17 History





(DRISDOL)]    


 


Ipratropium-Albuterol Nebulize 3 ml INHALATION RT-QID PRN 03/15/17 04/04/17 

History





[Duoneb 0.5 mg-3 mg/3 ml Soln]    


 


Melatonin 3 mg PO HS 03/15/17 04/04/17 History


 


Multivitamins, Thera [Multivitamin 1 tab PO DAILY 03/15/17 04/04/17 History





(formulary)]    


 


Omeprazole 40 mg PO DAILY 03/15/17 04/04/17 History


 


amLODIPine [Norvasc] 10 mg PO DAILY 03/15/17 04/04/17 History


 


hydrALAZINE HCL [Apresoline] 75 mg PO BID 03/15/17 04/04/17 History


 


oxyCODONE HCL 20 mg PO TID 03/15/17 04/04/17 History


 


Aspirin 81 mg PO DAILY 17 History


 


Furosemide [Lasix] 40 mg PO DAILY 17 History


 


Losartan Potassium [Cozaar] 100 mg PO DAILY 17 History


 


Potassium Chloride ER [K-Dur 20] 20 meq PO DAILY 17 History











 Allergies











Allergy/AdvReac Type Severity Reaction Status Date / Time


 


diphenhydramine Allergy  Unknown Verified 17 12:30





[From Benadryl]     


 


latex Allergy  Unknown Verified 17 12:31


 


morphine Allergy  Unknown Verified 17 12:30


 


pregabalin [From Lyrica] Allergy  Unknown Verified 17 12:30


 


Sulfa (Sulfonamide Allergy  Unknown Verified 17 12:30





Antibiotics)     














Physical Exam


Vitals: 


 Vital Signs











  Temp Pulse Resp BP Pulse Ox


 


 17 13:48   78   


 


 17 13:42   88  16  113/54  95


 


 17 13:25   78   


 


 17 13:12   90  16  91/53  94 L


 


 17 12:42   88  17  113/51  94 L


 


 17 12:30   87  18  108/53  93 L


 


 17 11:23  97.8 F  96  18  111/54  80 L








 Intake and Output











 17





 22:59 06:59 14:59


 


Other:   


 


  Weight   54.431 kg








 Patient Weight











 17





 06:59


 


Weight 54.431 kg














- Constitutional


General appearance: average body habitus, mild distress





- EENT


Eyes: anicteric sclerae, EOMI, PERRLA, no ptosis, no scleral icterus, normal 

appearance


ENT: hard of hearing, NA/AT, normal oropharynx, no thrush


Ears: bilateral: normal





- Neck


Neck: no lymphadenopathy, normal ROM, no rigidity, no stridor, no thyromegaly


Carotids: bilateral: upstroke delayed, bruit present


Thyroid: negative: normal size





- Respiratory


Respiratory: left: rhonchi, wheezing, prolonged expiration, bilateral: 

diminished





- Cardiovascular


Rhythm: regular


Heart sounds: normal: S1, S2


Abnormal Heart Sounds: systolic murmur, no S3 Gallop, no S4 Gallop





- Gastrointestinal


General gastrointestinal: distended, normal bowel sounds, soft, no splenomegaly

, no tenderness, no umbilical hernia, no ventral hernia





- Integumentary


Integumentary: decreased turgor, pale





- Neurologic


Neurologic: CNII-XII intact





- Musculoskeletal


Musculoskeletal: generalized weakness, strength equal bilaterally





- Psychiatric


Psychiatric: A&O x's 3, appropriate affect, intact judgment & insight





Results


CBC & Chem 7: 


 17 12:41





 17 12:41


Labs: 


 Abnormal Lab Results - Last 24 Hours (Table)











  17 Range/Units





  12:41 12:41 12:41 


 


WBC    17.4 H  (3.8-10.6)  k/uL


 


RBC    2.77 L  (3.80-5.40)  m/uL


 


Hgb    6.8 L* D  (11.4-16.0)  gm/dL


 


Hct    21.5 L  (34.0-46.0)  %


 


MCV    77.6 L D  (80.0-100.0)  fL


 


MCH    24.6 L  (25.0-35.0)  pg


 


RDW    19.5 H  (11.5-15.5)  %


 


Plt Count    148 L  (150-450)  k/uL


 


Neutrophils # (Manual)    16.0 H  (1.3-7.7)  k/uL


 


Lymphocytes # (Manual)    0.5 L  (1.0-4.8)  k/uL


 


PT  16.4 H    (9.0-12.0)  sec


 


BUN     (7-17)  mg/dL


 


Creatinine     (0.52-1.04)  mg/dL


 


Glucose     (74-99)  mg/dL


 


Plasma Lactic Acid Holland     (0.7-2.0)  mmol/L


 


Calcium     (8.4-10.2)  mg/dL


 


AST     (14-36)  U/L


 


ALT     (9-52)  U/L


 


Troponin I   0.267 H*   (0.000-0.034)  ng/mL


 


Total Protein     (6.3-8.2)  g/dL


 


Albumin     (3.5-5.0)  g/dL


 


Stool Occult Blood     (Negative)  














  17 Range/Units





  12:41 12:41 13:10 


 


WBC     (3.8-10.6)  k/uL


 


RBC     (3.80-5.40)  m/uL


 


Hgb     (11.4-16.0)  gm/dL


 


Hct     (34.0-46.0)  %


 


MCV     (80.0-100.0)  fL


 


MCH     (25.0-35.0)  pg


 


RDW     (11.5-15.5)  %


 


Plt Count     (150-450)  k/uL


 


Neutrophils # (Manual)     (1.3-7.7)  k/uL


 


Lymphocytes # (Manual)     (1.0-4.8)  k/uL


 


PT     (9.0-12.0)  sec


 


BUN  54 H    (7-17)  mg/dL


 


Creatinine  1.45 H    (0.52-1.04)  mg/dL


 


Glucose  120 H    (74-99)  mg/dL


 


Plasma Lactic Acid Holland   2.2 H*   (0.7-2.0)  mmol/L


 


Calcium  7.8 L    (8.4-10.2)  mg/dL


 


AST  1301 H    (14-36)  U/L


 


ALT  1293 H    (9-52)  U/L


 


Troponin I     (0.000-0.034)  ng/mL


 


Total Protein  5.1 L    (6.3-8.2)  g/dL


 


Albumin  2.8 L    (3.5-5.0)  g/dL


 


Stool Occult Blood    Positive H  (Negative)  














Thrombosis Risk Factor Assmnt





- DVT/VTE Prophylaxis


DVT/VTE Prophylaxis: Pharmacologic Prophylaxis ordered, Mechanical Prophylaxis 

ordered





Assessment and Plan


Plan: 





Assessment and plan:





1.  Acute respiratory failure secondary to hemoptysis with left upper lobe 

pneumonia and acute exacerbation of COPD.  Start the patient on Levaquin 500 mg 

IV piggyback every 48 hours, Zosyn 2.25 g IV piggyback every 6 hours, Mucinex 

600 mg orally twice every day, Solu-Medrol 60 minute gram IV push every 6 hours

, DuoNeb 3 mL nebulization 4 times every day, oxygen support, Pulmicort 0.5 mg 

nebulization twice every day.  pulmonary consultation from , admit the 

patient to the intensive care unit.





2.  Hemoptysis of unclear etiology could be related to left upper lobe 

pneumonia.  IV anabiotic, IV steroid, nebulized treatment, oxygen support, 

patient did go for bronchoscopy for possible malignancy.





3.  Acute blood loss anemia secondary to possible GI bleed as well as 

hemoptysis.  Type and cross and transfuse 2 units of packed red blood cells.





4. CAD post-PCI of the LAD.  Continue metoprolol 25 mg orally twice every day, 

Lipitor 40 mg orally once every day, losartan 50 mg orally once every day, 

patient was taken off her aspirin and Eliquis for now because of GI bleed.





5.  History of CVA of the left frontal lobe.  DC aspirin and Eliquis for now.





6.  Carotid artery disease as well as PAD.  Currently under the care of 

vascular surgery continue Lipitor 40 mg orally once every day for second or 

prevention patient cannot go on anticoagulation until after GI workup and 

hemoptysis workup.





7.  Left upper lobe pneumonia.  IV anabiotic Levaquin and Zosyn, IV steroids, 

nebulized treatment, oxygen support, may need bronchoscopy.





8.  Hypertension and hypertensive cardiovascular disease.  Continue losartan 50 

mg orally once every day, metoprolol 25 mg orally twice every day, hydralazine 

75 mg orally 2 times every day.





9.  Hyperlipidemia.  Continue Lipitor 40 mg orally once every day.





10.  ALLERGIC rhinitis.  Continue singular 10 mg at bedtime.





11.  Lumbar degenerative disc disease with spondylolisthesis and facet 

arthropathy.  Post epidural injection, patient is to be maintained on oxycodone 

20 mg orally 3 times every day.





12. PAF.will continue metoprolol 25 mg orally twice every day, hold Eliquis.





13.  DVT prophylaxis.  Patient will be taken off Eliquis for now she will have 

knee-high KEVIN hose as well as SCD's.





14.  GI prophylaxis.  Continue Protonix 40 mg IV push every 24 hours.





15.  Patient is full code.





16.  Admit to inpatient.  Estimate length of stay 2 midnights.





17.  Prognosis is guarded.

## 2017-04-04 NOTE — XR
EXAMINATION TYPE: XR chest 1V portable

 

DATE OF EXAM: 4/4/2017 1:07 PM

 

HISTORY: jese.

 

REFERENCE: Previous study dated 3/20/2017.

 

FINDINGS: There is a worsening left upper lobe opacity. There is some chronic parenchymal changes at 
the right lung base. The heart is enlarged. There is a small left effusion.

 

IMPRESSION: 

1. CARDIOMEGALY.

2. WORSENING INFILTRATE IN THE LEFT UPPER LOBE.

3. I SUSPECT A SMALL LEFT EFFUSION.

## 2017-04-04 NOTE — US
EXAMINATION TYPE: US abdomen complete

 

DATE OF EXAM: 4/4/2017 7:24 PM

 

COMPARISON: CT in PACS

 

CLINICAL HISTORY: elevated transaminases. Difficult/limited exam due to overlying bowel gas 

 

EXAM MEASUREMENTS:

 

Liver Length:  17.0 cm   

Gallbladder Wall:  Surgically absent    

CBD:  1.6 cm

Spleen:  8.5 cm   

Right Kidney:  10.8 x 5.7 x 4.7 cm 

Left Kidney:  9.6 x 5.7 x 5.7  cm   

 

 

 

Pancreas:  Tail obscured by overlying bowel gas, duct visualized measuring 0.2 cm

Liver:  Measuring upper limits of normal, limited evaluation due to overlying bowel gas, visualized p
ortions show no mass 

Gallbladder:  Surgically absent

**Evidence for sonographic Ham's sign:  No

CBD:  Dilated, distal portion obscured by bowel gas 

Spleen:  wnl   

Right Kidney:  No hydronephrosis or masses seen   

Left Kidney:  Cyst visualized at upper pole measuring 2.6 x 2.6 x 2.1 cm. Echogenic foci visualized m
easuring 0.5 cm    

Upper IVC:  wnl  

Abd Aorta:  Limited/difficult evaluation due to overlying bowel gas. Visualized portions show atheros
clerotic changes

 

 

 

 

 

IMPRESSION: Right renal cyst. No evidence of solid renal mass or obstruction. No dilated ducts.

## 2017-04-04 NOTE — P.CNPUL
History of Present Illness


Consult date: 17


Reason for consult: dyspnea, cough


Chief complaint: Cough, SOB


History of present illness: 





This is a 71 year old female who presented to the emergency department 

complaining of black stools and coughing up blood.  She states this has been on 

going for about 4 days.  She was recently admitted on 03/15/2017 for similar 

complaints.  The patient had cough and fever at the time.  She was discharged 

on 2017 with antibiotics after she was treated for pneumonia and UTI.  

The patient is currently complaining of a heavy feeling in her left chest.  She 

states the blood that she is coughing up is dark and not bright red.  She 

denies fevers and chills.  She has been on Eliquis for 3 years due to atrial 

fibrillation.  The patient is also having shortness of breath.  She denies 

dizziness and lightheadedness.  She denies headaches.  The patient was found to 

be anemic upon arrival to the emergency department.  She is currently receiving 

2 units of packed red blood cells.  She was also found to have acute kidney 

injury.  Her chest x-ray shows worsening left lung infiltrate and cardiomegaly.





Review of Systems


All systems: negative





Past Medical History


Past Medical History: Atrial Fibrillation, Asthma, Coronary Artery Disease (CAD)

, COPD, CVA/TIA, GERD/Reflux, GI Bleed, Hyperlipidemia, Hypertension, Mitral 

Valve Prolapse (MVP), Musculoskeletal Disorder, Osteoarthritis (OA), 

Respiratory Disorder, Vascular Disorder


Additional Past Medical History / Comment(s): diverticulitits


Last Myocardial Infarction Date:: unknown


History of Any Multi-Drug Resistant Organisms: None Reported


Past Surgical History: Cholecystectomy, Heart Catheterization With Stent, 

Hysterectomy, Orthopedic Surgery


Additional Past Surgical History / Comment(s): carotid


Past Anesthesia/Blood Transfusion Reactions: No Reported Reaction


Date of Last Stent Placement:: 


Past Psychological History: No Psychological Hx Reported


Smoking Status: Current some day smoker


Past Alcohol Use History: None Reported


Past Drug Use History: None Reported





- Past Family History


  ** Son(s)


Family Medical History: No Reported History (Patient has one son no major 

medical problems.)





  ** Daughter(s)


Family Medical History: No Reported History (Patient has one daughter no major 

problems.)





  ** Mother


Family Medical History: Congestive Heart Failure (CHF) (Mother  from 

congestive heart failure.)





  ** Father


Family Medical History: Cancer (Father  at age 73 from lung cancer.)


Additional Family Medical History / Comment(s): Bone ca





  ** Sister(s)


Family Medical History: Coronary Artery Disease (CAD) (Patient has 3 sisters 

one of them was CAD.)





  ** Brother(s)


Family Medical History: Coronary Artery Disease (CAD) (Patient has one brother 

with CAD post MI.  And renal failure.), Myocardial Infarction (MI)





Medications and Allergies


 Home Medications











 Medication  Instructions  Recorded  Confirmed  Type


 


Montelukast Sodium [Singulair] 10 mg PO HS 14 History


 


ALPRAZolam [Xanax] 0.5 mg PO HS 12/05/15 04/04/17 History


 


Apixaban [Eliquis] 2.5 mg PO BID 12/05/15 04/04/17 History


 


Metoprolol Tartrate [Lopressor] 25 mg PO BID 12/05/15 04/04/17 History


 


Nitroglycerin Sl Tabs [Nitrostat] 0.4 mg SUBLINGUAL Q5M PRN 12/05/15 04/04/17 

History


 


Atorvastatin [Lipitor] 40 mg PO HS 12/06/15 04/04/17 History


 


Ergocalciferol [Vitamin D2 50,000 unit PO Q7D 03/15/17 04/04/17 History





(DRISDOL)]    


 


Ipratropium-Albuterol Nebulize 3 ml INHALATION RT-QID PRN 03/15/17 04/04/17 

History





[Duoneb 0.5 mg-3 mg/3 ml Soln]    


 


Melatonin 3 mg PO HS 03/15/17 04/04/17 History


 


Multivitamins, Thera [Multivitamin 1 tab PO DAILY 03/15/17 04/04/17 History





(formulary)]    


 


Omeprazole 40 mg PO DAILY 03/15/17 04/04/17 History


 


amLODIPine [Norvasc] 10 mg PO DAILY 03/15/17 04/04/17 History


 


hydrALAZINE HCL [Apresoline] 75 mg PO BID 03/15/17 04/04/17 History


 


oxyCODONE HCL 20 mg PO TID 03/15/17 04/04/17 History


 


Aspirin 81 mg PO DAILY 17 History


 


Furosemide [Lasix] 40 mg PO DAILY 17 History


 


Losartan Potassium [Cozaar] 100 mg PO DAILY 17 History


 


Potassium Chloride ER [K-Dur 20] 20 meq PO DAILY 17 History











 Allergies











Allergy/AdvReac Type Severity Reaction Status Date / Time


 


diphenhydramine Allergy  Unknown Verified 17 12:30





[From Benadryl]     


 


latex Allergy  Unknown Verified 17 12:31


 


morphine Allergy  Unknown Verified 17 12:30


 


pregabalin [From Lyrica] Allergy  Unknown Verified 17 12:30


 


Sulfa (Sulfonamide Allergy  Unknown Verified 17 12:30





Antibiotics)     














Physical Exam


Osteopathic Statement: *.  No significant issues noted on an osteopathic 

structural exam other than those noted in the History and Physical/Consult.


Vitals: 


 Vital Signs











  Temp Pulse Resp BP Pulse Ox


 


 17 16:35  98.5 F  92  24  106/56  99


 


 17 16:05  98.3 F  93  16  116/62  93 L


 


 17 16:03  98.3 F  93  16  116/62  94 L


 


 17 15:55  98.1 F  100  18  108/62  93 L


 


 17 15:47   90   








 Intake and Output











 17





 06:59 14:59 22:59


 


Intake Total   0


 


Balance   0


 


Intake:   


 


  Blood Product   0


 


    Rc Pheresis 2 As3  Unit   0





    [id number]   














Gen.: Patient is alert and oriented 3, she does appear to be acutely ill


Cardiovascular: Regular rate and rhythm, S1/S2


Lungs: Coarse rhonchi bilaterally


Abdomen: Soft nontender nondistended positive bowel sounds


Extremities: 1-2+ pitting edema





Results





- Laboratory Findings


CBC and BMP: 


 17 12:41





 17 12:41


PT/INR, D-dimer











PT  16.4 sec (9.0-12.0)  H  17  12:41    


 


INR  1.7  (<1.1)   17  12:41    








Abnormal lab findings: 


 Abnormal Labs











  17





  16:05


 


Ur Oxycodone Screen  Detected H














- Diagnostic Findings


Chest x-ray: report reviewed, image reviewed





Assessment and Plan


Plan: 





Acute hypoxic respiratory failure


Left lung infiltrate: Pneumonia versus pulmonary hemorrhage: question pulmonary 

renal syndrome


Acute blood loss anemia


Acute exacerbation of COPD


Hemoptysis


Acute kidney injury


Coagulopathy secondary to novel anticoagulant


Sepsis with SIRS present on admission


Recent E. coli UTI


Elevated transaminases of unclear etiology


History of congestive heart failure, diastolic, ejection fraction 45-50%


Severe pulmonary hypertension with an RVSP of 58 mmHg


Chronic kidney disease stage III


History of coronary artery disease


Valvular heart disease


History of CVA


Dyslipidemia


History of hypertension





O2 to maintain saturation greater than or equal to 88%


Admit to intensive care unit


Transfuse 2 units of packed red blood cells


Hold all anticoagulation


IV fluid hydration


Antibiotics: Levaquin and Zosyn


Bronchodilators


Pulmicort


Hold statin and Tylenol due to elevated liver enzymes


Hold Losartan due to BLACK


Solu-Medrol


Blood, urine, sputum cultures


ANCA, AntiGBM, Complement, AntiDS DNA


Abdominal ultrasound


No nephrotoxins


Avoid hypotension


GI prophylaxis with Protonix


DVT prophylaxis with SCDs


Gastroenterology consult


Consult nephrology


The patient may need bronchoscopy in the next 24-48 hours, will continue to 

monitor patient's respiratory status.


Thank you for this consultation.  We will continue to follow along.





Time with Patient: Greater than 30

## 2017-04-05 LAB
ALP SERPL-CCNC: 89 U/L (ref 38–126)
ALT SERPL-CCNC: 1251 U/L (ref 9–52)
ANION GAP SERPL CALC-SCNC: 8 MMOL/L
AST SERPL-CCNC: 862 U/L (ref 14–36)
BILIRUBIN DIRECT+TOT PNL SERPL-MCNC: 1 MG/DL (ref 0–0.2)
BUN SERPL-SCNC: 56 MG/DL (ref 7–17)
CALCIUM SPEC-MCNC: 7.9 MG/DL (ref 8.4–10.2)
CELLS COUNTED: 200
CH: 26
CHCM: 32.2
CHLORIDE SERPL-SCNC: 101 MMOL/L (ref 98–107)
CO2 SERPL-SCNC: 29 MMOL/L (ref 22–30)
ERYTHROCYTE [DISTWIDTH] IN BLOOD BY AUTOMATED COUNT: 3.33 M/UL (ref 3.8–5.4)
ERYTHROCYTE [DISTWIDTH] IN BLOOD: 18.7 % (ref 11.5–15.5)
GLUCOSE SERPL-MCNC: 123 MG/DL (ref 74–99)
HCT VFR BLD AUTO: 26.8 % (ref 34–46)
HDW: 4.47
HGB BLD-MCNC: 8.7 GM/DL (ref 11.4–16)
MAGNESIUM SPEC-SCNC: 1.9 MG/DL (ref 1.6–2.3)
MCH RBC QN AUTO: 26 PG (ref 25–35)
MCHC RBC AUTO-ENTMCNC: 32.3 G/DL (ref 31–37)
MCV RBC AUTO: 80.6 FL (ref 80–100)
NON-AFRICAN AMERICAN GFR(MDRD): 37
PH UR: 5 [PH] (ref 5–8)
PHOSPHATE SERPL-MCNC: 5.1 MG/DL (ref 2.5–4.5)
POTASSIUM SERPL-SCNC: 4 MMOL/L (ref 3.5–5.1)
PROT SERPL-MCNC: 4.7 G/DL (ref 6.3–8.2)
SODIUM SERPL-SCNC: 138 MMOL/L (ref 137–145)
SP GR UR: 1.01 (ref 1–1.03)
TROPONIN I SERPL-MCNC: 0.12 NG/ML (ref 0–0.03)
TROPONIN I SERPL-MCNC: 0.14 NG/ML (ref 0–0.03)
UA BILLING (MACRO VS. MICRO): (no result)
UROBILINOGEN UR QL STRIP: <2 MG/DL (ref ?–2)
WBC # BLD AUTO: 14 K/UL (ref 3.8–10.6)
WBC (PEROX): 14.13

## 2017-04-05 PROCEDURE — 30233N1 TRANSFUSION OF NONAUTOLOGOUS RED BLOOD CELLS INTO PERIPHERAL VEIN, PERCUTANEOUS APPROACH: ICD-10-PCS

## 2017-04-05 RX ADMIN — CEFAZOLIN SCH MLS/HR: 330 INJECTION, POWDER, FOR SOLUTION INTRAMUSCULAR; INTRAVENOUS at 15:44

## 2017-04-05 RX ADMIN — METHYLPREDNISOLONE SODIUM SUCCINATE SCH MG: 125 INJECTION, POWDER, FOR SOLUTION INTRAMUSCULAR; INTRAVENOUS at 22:23

## 2017-04-05 RX ADMIN — POTASSIUM CHLORIDE SCH MEQ: 20 TABLET, EXTENDED RELEASE ORAL at 09:52

## 2017-04-05 RX ADMIN — MAGNESIUM SULFATE IN DEXTROSE SCH MLS/HR: 10 INJECTION, SOLUTION INTRAVENOUS at 06:53

## 2017-04-05 RX ADMIN — OXYCODONE HYDROCHLORIDE SCH MG: 20 TABLET, FILM COATED, EXTENDED RELEASE ORAL at 22:11

## 2017-04-05 RX ADMIN — OXYCODONE HYDROCHLORIDE SCH MG: 20 TABLET, FILM COATED, EXTENDED RELEASE ORAL at 15:42

## 2017-04-05 RX ADMIN — MONTELUKAST SODIUM SCH MG: 10 TABLET, FILM COATED ORAL at 22:25

## 2017-04-05 RX ADMIN — THERA TABS SCH EACH: TAB at 09:52

## 2017-04-05 RX ADMIN — METOPROLOL TARTRATE SCH MG: 25 TABLET, FILM COATED ORAL at 08:57

## 2017-04-05 RX ADMIN — DEXTROSE MONOHYDRATE SCH MLS/HR: 5 INJECTION, SOLUTION INTRAVENOUS at 15:59

## 2017-04-05 RX ADMIN — DEXTROSE MONOHYDRATE SCH MLS/HR: 5 INJECTION, SOLUTION INTRAVENOUS at 00:17

## 2017-04-05 RX ADMIN — Medication SCH MG: at 22:22

## 2017-04-05 RX ADMIN — BUDESONIDE SCH: 0.5 INHALANT ORAL at 20:00

## 2017-04-05 RX ADMIN — SUCRALFATE SCH GM: 1 TABLET ORAL at 22:10

## 2017-04-05 RX ADMIN — PANTOPRAZOLE SODIUM SCH MG: 40 INJECTION, POWDER, FOR SOLUTION INTRAVENOUS at 09:52

## 2017-04-05 RX ADMIN — SUCRALFATE SCH GM: 1 TABLET ORAL at 08:57

## 2017-04-05 RX ADMIN — BUDESONIDE SCH MG: 0.5 INHALANT ORAL at 08:26

## 2017-04-05 RX ADMIN — CEFAZOLIN SCH MLS/HR: 330 INJECTION, POWDER, FOR SOLUTION INTRAMUSCULAR; INTRAVENOUS at 04:13

## 2017-04-05 RX ADMIN — METOPROLOL TARTRATE SCH MG: 25 TABLET, FILM COATED ORAL at 22:25

## 2017-04-05 RX ADMIN — FUROSEMIDE SCH MG: 40 TABLET ORAL at 09:52

## 2017-04-05 RX ADMIN — METHYLPREDNISOLONE SODIUM SUCCINATE SCH MG: 125 INJECTION, POWDER, FOR SOLUTION INTRAMUSCULAR; INTRAVENOUS at 09:01

## 2017-04-05 RX ADMIN — OXYCODONE HYDROCHLORIDE SCH MG: 20 TABLET, FILM COATED, EXTENDED RELEASE ORAL at 09:01

## 2017-04-05 RX ADMIN — DEXTROSE MONOHYDRATE SCH MLS/HR: 5 INJECTION, SOLUTION INTRAVENOUS at 09:01

## 2017-04-05 RX ADMIN — MAGNESIUM SULFATE IN DEXTROSE SCH MLS/HR: 10 INJECTION, SOLUTION INTRAVENOUS at 08:57

## 2017-04-05 RX ADMIN — IPRATROPIUM BROMIDE AND ALBUTEROL SULFATE PRN ML: .5; 3 SOLUTION RESPIRATORY (INHALATION) at 08:27

## 2017-04-05 NOTE — P.PN
Subjective


Principal diagnosis: 





Acute hypoxic respiratory failure





Patient seen and examined in the ICU with nursing staff at bedside.  The 

patient states she is feeling much better today.  She is asking when she can go 

home.  She's been hemodynamically stable.  Per nursing there has only been 

streaks of blood that the patient has coughed up.  She has not had any mg 

hemoptysis.





Objective





- Vital Signs


Vital signs: 


 Vital Signs











Temp  98 F   04/05/17 08:00


 


Pulse  89   04/05/17 10:00


 


Resp  14   04/05/17 10:00


 


BP  117/54   04/05/17 10:00


 


Pulse Ox  94 L  04/05/17 10:00








 Intake & Output











 04/04/17 04/05/17 04/05/17





 18:59 06:59 18:59


 


Intake Total 310 1760.0 390


 


Output Total  790 105


 


Balance 310 970.0 285


 


Weight  73.9 kg 73.9 kg


 


Intake:   


 


  Amount of Fluid Infused (  900 





  ml)   


 


  Intake, IV Titration  550.0 390





  Amount   


 


    Magnesium Sulfate-D5w Pmx   100





    1 gm In Dextrose/Water 1   





    100ml.bag @ 100 mls/hr   





    IVPB Q1H [facility] Rx#:   





    [id number]   


 


    Piperacillin-Tazobactam 3  50.0 





    .375 gm In Dextrose/Water   





    1 50ml.bag @ 12.5 mls/hr   





    IVPB Q8HR [facility] Rx#:   





    [id number]   


 


    Sodium Chloride 0.9% 1,  300 90





    000 ml @ 100 mls/hr IV .   





    Q10H CORBIN Rx#:[id number]   


 


    Sodium Chloride 0.9% 1,  200 200





    000 ml @ 100 mls/hr IV .   





    Q10H STA Rx#:[id number]   


 


  Blood Product 310 310 


 


    Rc As-1  Unit 0 310 





    [id number]   


 


    Rc Pheresis 2 As3  Unit 310  





    [id number]   


 


Output:   


 


  Urine  790 105


 


Other:   


 


  Voiding Method  Indwelling Catheter Indwelling Catheter














- Exam





Gen.: Patient is alert and oriented 3, she does appear to be acutely ill


Cardiovascular: Regular rate and rhythm, S1/S2


Lungs: Coarse rhonchi bilaterally


Abdomen: Soft nontender nondistended positive bowel sounds


Extremities: 1-2+ pitting edema





- Labs


CBC & Chem 7: 


 04/05/17 04:53





 04/05/17 04:53


Labs: 


 Abnormal Lab Results - Last 24 Hours (Table)











  04/04/17 04/04/17 04/04/17 Range/Units





  16:05 21:30 23:30 


 


WBC     (3.8-10.6)  k/uL


 


RBC     (3.80-5.40)  m/uL


 


Hgb     (11.4-16.0)  gm/dL


 


Hct     (34.0-46.0)  %


 


RDW     (11.5-15.5)  %


 


Plt Count     (150-450)  k/uL


 


Neutrophils # (Manual)     (1.3-7.7)  k/uL


 


Lymphocytes # (Manual)     (1.0-4.8)  k/uL


 


BUN     (7-17)  mg/dL


 


Creatinine     (0.52-1.04)  mg/dL


 


Glucose     (74-99)  mg/dL


 


POC Glucose (mg/dL)   133 H   (75-99)  mg/dL


 


Calcium     (8.4-10.2)  mg/dL


 


Phosphorus     (2.5-4.5)  mg/dL


 


Total Bilirubin     (0.2-1.3)  mg/dL


 


Delta Bilirubin     (0.0-0.2)  mg/dL


 


AST     (14-36)  U/L


 


ALT     (9-52)  U/L


 


Troponin I    0.141 H*  (0.000-0.034)  ng/mL


 


Total Protein     (6.3-8.2)  g/dL


 


Albumin     (3.5-5.0)  g/dL


 


Urine Protein     (Negative)  


 


Ur Oxycodone Screen  Detected H    (NotDetected)  














  04/05/17 04/05/17 04/05/17 Range/Units





  04:15 04:53 04:53 


 


WBC   14.0 H   (3.8-10.6)  k/uL


 


RBC   3.33 L   (3.80-5.40)  m/uL


 


Hgb   8.7 L D   (11.4-16.0)  gm/dL


 


Hct   26.8 L   (34.0-46.0)  %


 


RDW   18.7 H   (11.5-15.5)  %


 


Plt Count   116 L   (150-450)  k/uL


 


Neutrophils # (Manual)   13.5 H   (1.3-7.7)  k/uL


 


Lymphocytes # (Manual)   0.4 L   (1.0-4.8)  k/uL


 


BUN    56 H  (7-17)  mg/dL


 


Creatinine    1.40 H  (0.52-1.04)  mg/dL


 


Glucose    123 H  (74-99)  mg/dL


 


POC Glucose (mg/dL)     (75-99)  mg/dL


 


Calcium    7.9 L  (8.4-10.2)  mg/dL


 


Phosphorus    5.1 H  (2.5-4.5)  mg/dL


 


Total Bilirubin    2.3 H  (0.2-1.3)  mg/dL


 


Delta Bilirubin     (0.0-0.2)  mg/dL


 


AST    862 H  (14-36)  U/L


 


ALT    1251 H  (9-52)  U/L


 


Troponin I     (0.000-0.034)  ng/mL


 


Total Protein    4.7 L  (6.3-8.2)  g/dL


 


Albumin    2.5 L  (3.5-5.0)  g/dL


 


Urine Protein  Trace H    (Negative)  


 


Ur Oxycodone Screen     (NotDetected)  














  04/05/17 04/05/17 Range/Units





  04:53 04:53 


 


WBC    (3.8-10.6)  k/uL


 


RBC    (3.80-5.40)  m/uL


 


Hgb    (11.4-16.0)  gm/dL


 


Hct    (34.0-46.0)  %


 


RDW    (11.5-15.5)  %


 


Plt Count    (150-450)  k/uL


 


Neutrophils # (Manual)    (1.3-7.7)  k/uL


 


Lymphocytes # (Manual)    (1.0-4.8)  k/uL


 


BUN    (7-17)  mg/dL


 


Creatinine    (0.52-1.04)  mg/dL


 


Glucose    (74-99)  mg/dL


 


POC Glucose (mg/dL)    (75-99)  mg/dL


 


Calcium    (8.4-10.2)  mg/dL


 


Phosphorus    (2.5-4.5)  mg/dL


 


Total Bilirubin   2.3 H  (0.2-1.3)  mg/dL


 


Delta Bilirubin   1.0 H  (0.0-0.2)  mg/dL


 


AST    (14-36)  U/L


 


ALT    (9-52)  U/L


 


Troponin I  0.124 H*   (0.000-0.034)  ng/mL


 


Total Protein    (6.3-8.2)  g/dL


 


Albumin    (3.5-5.0)  g/dL


 


Urine Protein    (Negative)  


 


Ur Oxycodone Screen    (NotDetected)  








 Microbiology - Last 24 Hours (Table)











 04/05/17 04:15 Urine Culture - Preliminary





 Urine,Catheterized 














Assessment and Plan


Plan: 


Acute hypoxic respiratory failure


Left lung infiltrate: Pneumonia versus pulmonary hemorrhage: question pulmonary 

renal syndrome


Acute blood loss anemia


Acute GI bleed


Acute exacerbation of COPD


Hemoptysis


Acute kidney injury


Coagulopathy secondary to novel anticoagulant


Sepsis with SIRS present on admission


Recent E. coli UTI


Elevated transaminases of unclear etiology


History of congestive heart failure, diastolic, ejection fraction 45-50%


Severe pulmonary hypertension with an RVSP of 58 mmHg


Chronic kidney disease stage III


History of coronary artery disease


Valvular heart disease


History of CVA


Dyslipidemia


History of hypertension





O2 to maintain saturation greater than or equal to 88%


Monitor hemoglobin, transfuse for hemoglobin less than 7


Hold all anticoagulation


IV fluid hydration


Antibiotics: Levaquin and Zosyn


Bronchodilators


Pulmicort


Hold statin and Tylenol due to elevated liver enzymes


Hold Losartan due to BLACK


Solu-Medrol


Blood, urine, sputum cultures


ANCA, AntiGBM, Complement, AntiDS DNA


No nephrotoxins


Avoid hypotension


GI prophylaxis with Protonix


DVT prophylaxis with SCDs


Gastroenterology consult - possible plan for MRCP


Nephrology recs


Monitor CXR, if continued improvement with no recurrent hemoptysis, will defer 

bronchoscopy at this time.


Ok to transfer out of the ICU today

## 2017-04-05 NOTE — P.PN
Subjective





This is a 77-year-old  female one of my patient with a previous 

medical history significant for CAD post-PCI of the LAD, hypertension and 

hypertensive cardio vascular disease with left ventricular hypertrophy, 

hyperlipidemia, history of the for arterial occlusive disease, carotid artery 

disease status post right carotid endarterectomy with about 70% stenosis of 

bilateral carotid arteries, has been under the care of cardiology, pulmonary 

medicine, vascular surgery, gastroenterology, she was recently hospitalized at 

[facility] in 03/16/2017 after she was admitted for acute 

respiratory failure secondary to acute diastolic heart failure and was seen and 

evaluated by pulmonary and cardiology along with gastroenterology at that time 

she was given 2 units of packed red blood cells and the patient was sent home 

as a matter fact I saw the patient Rocephin the office about a week ago and she 

was feeling a bit better with increased swelling in both lower extremities she 

was placed on Lasix along with potassium supplement, she had laboratory 

evaluation that showed significant anemia patient was scheduled to go for blood 

transfusion yesterday patient was out of it completely and she did not call for 

the transfer, her daughter was with her the bedside and she stated that nobody 

called her to tell her that she needed to go to have a blood transfusion, 

patient was quite out of it yesterday and she has not been eating much and she 

has not been taking her medicine over the last 24 hours today patient felt 

extremely weak and she was spitting up blood over the last 48 hours she ended 

up coming to the ER and she was seen in the ER and her hemoglobin was 6.8, and 

patient was found to have a left upper lobe of 100 along with hemoptysis she 

was admitted to the intensive care unit because she was hypotensive for acute 

respiratory failure secondary to left upper lobe pneumonia/hemoptysis with the 

acute exacerbation of COPD.





4/5: Patient is followed by Dr. Mendez.  She is maintained in the intensive 

care unit.  She does complain of coughing up blood today.  She is status post 2 

units packed RBC with repeat hemoglobin of 8.7.  BUN 56 and creatinine 1.4.  

She is continued on IV antibiotics with Zosyn and Levaquin.  Solu-Medrol 60 mg 

every 12 hours. Patient has been seen by gastroenterology with plan for clear 

liquid diet, consider EGD if active bleeding, MRCP if liver enzymes do not 

improve.  Abdominal ultrasound reveals right renal cyst.  No evidence of solid 

renal mass or obstruction.  No dilated ducts.  Repeat chest x-ray shows 

cardiomegaly with improved aeration of both lungs











Objective





- Vital Signs


Vital signs: 


 Vital Signs











Temp  98 F   04/05/17 08:00


 


Pulse  89   04/05/17 10:00


 


Resp  14   04/05/17 10:00


 


BP  117/54   04/05/17 10:00


 


Pulse Ox  94 L  04/05/17 10:00








 Intake & Output











 04/04/17 04/05/17 04/05/17





 18:59 06:59 18:59


 


Intake Total 310 1760.0 390


 


Output Total  790 105


 


Balance 310 970.0 285


 


Weight  73.9 kg 73.9 kg


 


Intake:   


 


  Amount of Fluid Infused (  900 





  ml)   


 


  Intake, IV Titration  550.0 390





  Amount   


 


    Magnesium Sulfate-D5w Pmx   100





    1 gm In Dextrose/Water 1   





    100ml.bag @ 100 mls/hr   





    IVPB Q1H [facility] Rx#:   





    [id number]   


 


    Piperacillin-Tazobactam 3  50.0 





    .375 gm In Dextrose/Water   





    1 50ml.bag @ 12.5 mls/hr   





    IVPB Q8HR [facility]BIN Rx#:   





    [id number]   


 


    Sodium Chloride 0.9% 1,  300 90





    000 ml @ 100 mls/hr IV .   





    Q10H CORBIN Rx#:[id number]   


 


    Sodium Chloride 0.9% 1,  200 200





    000 ml @ 100 mls/hr IV .   





    Q10H STA Rx#:[id number]   


 


  Blood Product 310 310 


 


    Rc As-1  Unit 0 310 





    [id number]   


 


    Rc Pheresis 2 As3  Unit 310  





    [id number]   


 


Output:   


 


  Urine  790 105


 


Other:   


 


  Voiding Method  Indwelling Catheter Indwelling Catheter














- Exam





General appearance: average body habitus, mild distress





- EENT


Eyes: anicteric sclerae, EOMI, PERRLA, no ptosis, no scleral icterus, normal 

appearance


ENT: hard of hearing, NA/AT, normal oropharynx, no thrush


Ears: bilateral: normal





- Neck


Neck: no lymphadenopathy, normal ROM, no rigidity, no stridor, no thyromegaly


Carotids: bilateral: upstroke delayed, bruit present


Thyroid: negative: normal size





- Respiratory


Respiratory: left: rhonchi, wheezing, prolonged expiration, bilateral: 

diminished





- Cardiovascular


Rhythm: regular


Heart sounds: normal: S1, S2


Abnormal Heart Sounds: systolic murmur, no S3 Gallop, no S4 Gallop





- Gastrointestinal


General gastrointestinal: distended, normal bowel sounds, soft, no splenomegaly

, no tenderness, no umbilical hernia, no ventral hernia





- Integumentary


Integumentary: decreased turgor, pale





- Neurologic


Neurologic: CNII-XII intact





- Musculoskeletal


Musculoskeletal: generalized weakness, strength equal bilaterally





- Psychiatric


Psychiatric: A&O x's 3, appropriate affect, intact judgment & insight








- Labs


CBC & Chem 7: 


 04/05/17 04:53





 04/05/17 04:53


Labs: 


 Abnormal Lab Results - Last 24 Hours (Table)











  04/04/17 04/04/17 04/04/17 Range/Units





  16:05 21:30 23:30 


 


WBC     (3.8-10.6)  k/uL


 


RBC     (3.80-5.40)  m/uL


 


Hgb     (11.4-16.0)  gm/dL


 


Hct     (34.0-46.0)  %


 


RDW     (11.5-15.5)  %


 


Plt Count     (150-450)  k/uL


 


Neutrophils # (Manual)     (1.3-7.7)  k/uL


 


Lymphocytes # (Manual)     (1.0-4.8)  k/uL


 


BUN     (7-17)  mg/dL


 


Creatinine     (0.52-1.04)  mg/dL


 


Glucose     (74-99)  mg/dL


 


POC Glucose (mg/dL)   133 H   (75-99)  mg/dL


 


Calcium     (8.4-10.2)  mg/dL


 


Phosphorus     (2.5-4.5)  mg/dL


 


Total Bilirubin     (0.2-1.3)  mg/dL


 


AST     (14-36)  U/L


 


ALT     (9-52)  U/L


 


Troponin I    0.141 H*  (0.000-0.034)  ng/mL


 


Total Protein     (6.3-8.2)  g/dL


 


Albumin     (3.5-5.0)  g/dL


 


Urine Protein     (Negative)  


 


Ur Oxycodone Screen  Detected H    (NotDetected)  














  04/05/17 04/05/17 04/05/17 Range/Units





  04:15 04:53 04:53 


 


WBC   14.0 H   (3.8-10.6)  k/uL


 


RBC   3.33 L   (3.80-5.40)  m/uL


 


Hgb   8.7 L D   (11.4-16.0)  gm/dL


 


Hct   26.8 L   (34.0-46.0)  %


 


RDW   18.7 H   (11.5-15.5)  %


 


Plt Count   116 L   (150-450)  k/uL


 


Neutrophils # (Manual)   13.5 H   (1.3-7.7)  k/uL


 


Lymphocytes # (Manual)   0.4 L   (1.0-4.8)  k/uL


 


BUN    56 H  (7-17)  mg/dL


 


Creatinine    1.40 H  (0.52-1.04)  mg/dL


 


Glucose    123 H  (74-99)  mg/dL


 


POC Glucose (mg/dL)     (75-99)  mg/dL


 


Calcium    7.9 L  (8.4-10.2)  mg/dL


 


Phosphorus    5.1 H  (2.5-4.5)  mg/dL


 


Total Bilirubin    2.3 H  (0.2-1.3)  mg/dL


 


AST    862 H  (14-36)  U/L


 


ALT    1251 H  (9-52)  U/L


 


Troponin I     (0.000-0.034)  ng/mL


 


Total Protein    4.7 L  (6.3-8.2)  g/dL


 


Albumin    2.5 L  (3.5-5.0)  g/dL


 


Urine Protein  Trace H    (Negative)  


 


Ur Oxycodone Screen     (NotDetected)  














  04/05/17 Range/Units





  04:53 


 


WBC   (3.8-10.6)  k/uL


 


RBC   (3.80-5.40)  m/uL


 


Hgb   (11.4-16.0)  gm/dL


 


Hct   (34.0-46.0)  %


 


RDW   (11.5-15.5)  %


 


Plt Count   (150-450)  k/uL


 


Neutrophils # (Manual)   (1.3-7.7)  k/uL


 


Lymphocytes # (Manual)   (1.0-4.8)  k/uL


 


BUN   (7-17)  mg/dL


 


Creatinine   (0.52-1.04)  mg/dL


 


Glucose   (74-99)  mg/dL


 


POC Glucose (mg/dL)   (75-99)  mg/dL


 


Calcium   (8.4-10.2)  mg/dL


 


Phosphorus   (2.5-4.5)  mg/dL


 


Total Bilirubin   (0.2-1.3)  mg/dL


 


AST   (14-36)  U/L


 


ALT   (9-52)  U/L


 


Troponin I  0.124 H*  (0.000-0.034)  ng/mL


 


Total Protein   (6.3-8.2)  g/dL


 


Albumin   (3.5-5.0)  g/dL


 


Urine Protein   (Negative)  


 


Ur Oxycodone Screen   (NotDetected)  














Assessment and Plan


Plan: 





1.  Acute hypoxic respiratory failure secondary to hemoptysis with left upper 

lobe pneumonia and acute exacerbation of COPD.  Start the patient on Levaquin 

500 mg IV piggyback every 48 hours, Zosyn 2.25 g IV piggyback every 6 hours, 

Mucinex 600 mg orally twice every day, Solu-Medrol 60 minute gram IV push every 

6 hours, DuoNeb 3 mL nebulization 4 times every day, oxygen support, Pulmicort 

0.5 mg nebulization twice every day.  pulmonary consultation from , 

admit the patient to the intensive care unit.





2.  Hemoptysis of unclear etiology could be related to left upper lobe 

pneumonia.  IV anabiotic, IV steroid, nebulized treatment, oxygen support, 

patient did go for bronchoscopy for possible malignancy.





3.  Acute blood loss anemia secondary to possible GI bleed as well as 

hemoptysis.  Status post transfusion of 2 units of packed red blood cells.





4. CAD post-PCI of the LAD.  Continue metoprolol 25 mg orally twice every day, 

Lipitor 40 mg orally once every day, losartan 50 mg orally once every day, 

patient was taken off her aspirin and Eliquis for now because of GI bleed.





5.  History of CVA of the left frontal lobe.  DC aspirin and Eliquis for now.





6.  Carotid artery disease as well as PAD.  Currently under the care of 

vascular surgery continue Lipitor 40 mg orally once every day for second or 

prevention patient cannot go on anticoagulation until after GI workup and 

hemoptysis workup.





7.  Left upper lobe pneumonia.  IV anabiotic Levaquin and Zosyn, IV steroids, 

nebulized treatment, oxygen support, may need bronchoscopy.





8.  Hypertension and hypertensive cardiovascular disease.  Continue losartan 50 

mg orally once every day, metoprolol 25 mg orally twice every day, hydralazine 

75 mg orally 2 times every day.





9.  Hyperlipidemia.  Continue Lipitor 40 mg orally once every day.





10.  ALLERGIC rhinitis.  Continue singular 10 mg at bedtime.





11.  Lumbar degenerative disc disease with spondylolisthesis and facet 

arthropathy.  Post epidural injection, patient is to be maintained on oxycodone 

20 mg orally 3 times every day.





12. PAF.will continue metoprolol 25 mg orally twice every day, hold Eliquis.





13.  DVT prophylaxis.  Patient will be taken off Eliquis for now she will have 

knee-high KEVIN hose as well as SCD's.





14.  GI prophylaxis.  Continue Protonix 40 mg IV push every 24 hours.





15.  Patient is full code.





16.  Admit to inpatient.  Estimate length of stay 2 midnights.





17.  Prognosis is guarded.





Discharge plan: To be determined





Impression and plan of care have been directed as dictated by the signing 

physician.  Danielle Herbert nurse practitioner acting as scribe for signing 

physician.


Time with Patient: Greater than 30

## 2017-04-05 NOTE — P.CONS
History of Present Illness





- Reason for Consult


Consult date: 04/05/17


GI bleeding


Requesting physician: Kinjal Hood





- History of Present Illness


71-year-old female patient of  with a past medical history of anemia, CAD/

PCI stent, atrial fibrillation, ischemic cardiomyopathy, valvular heart disease

, peripheral arterial occlusive disease, carotid artery disease, COPD 2 L O2 

dependent, MI, cholecystectomy, GERD, hyperlipidemia, atrial fibrillation, CVA-

TIA, diverticulosis, and osteoarthritis.  Admitted with reports of coughing up 

blood. Black-colored stools 1 week with elevated liver enzymes.  Denies 

abdominal pain.  Lactic acid 2.2.  Systolic blood pressures on arrival in the 

90s improved with hydration.  Hemoglobin 6.8.  MCV 77.  Platelet 148.  White 

count 17.4.  Received 4 units of blood current hemoglobin 8.7.  INR 1.7.  BUN 

54.  Creatinine 1.4.  Hemoccult stool positive.  AST 1301 currently 862.  ALT 

1293 currently 1251.  Total bilirubin 1.3 currently 2.3.  Alkaline phosphatase 

69-89.  Abdominal ultrasound reported dilated CBD 1.6 cm. troponin 3 0.2, 0.14

, 0.12.





CT June 2016 reported mild to moderate extrahepatic and central intrahepatic 

biliary dilatation with CBD measuring 16 mm without obstructing calculus or 

mass.





Consultation requested for GI bleed.  Home medications include baby aspirin and 

Apixaban.





Admitted 3 weeks ago with anemia and was treated for pneumonia and E. coli UTI.

  Evaluated by GI service on 03/16/2017 for anemia in the 6-7 range without 

overt bleeding such as hematemesis hematochezia or melena however Hemoccult 

stool was positive.  EGD at [facility] February 2017 reported 

mild gastritis with no evidence of peptic ulcer disease.  EGD colonoscopy July 2016 performed by Dr. Craig reported antral gastritis sigmoid diverticulosis 

with polypectomy.  She underwent small bowel capsule endoscopy reported normal 

findings with no evidence of angiectasia as bleeding or other pathology.














Review of Systems


Constitutional: Denies fever, chills, sweats, weight gain, or loss.


HEENT: Negative for migraines, blurred vision or loss, earaches, drainage, 

tinnitus, oral mucosal lesions, dysphagia, or odynophagia.


CARDIAC: Ischemic cardiomyopathy.  MI.  CAD.  PVD.  Atrial fibrillation.  

Valvular heart disease.  Negative for chest pain, arrhythmias, or palpitation.


RESPIRATORY: COPD O2 dependent, asthma, nicotine cigarette dependency.  Reports 

shortness of breath, denies hemoptysis, occasional nonproductive cough.


GI: See HPI for pertinent findings.


: Negative for hematuria, urgency, frequency, polyuria, or dysuria.


GYNc: Denies possibility of pregnancy.  Negative vaginal discharge.


MUSCULOSKELETAL: Negative for muscle aches, swelling, arthritis, and 

arthralgias.  


NEUROLOGIC: Negative for stroke or TIA.


ENDOCRINE: Negative for thyroid problems.


SKIN: Negative for rash or itching.


PSYCHIATRIC: Negative history for depression and anxiety





All systems: negative (See HPI)





Past Medical History


Past Medical History: Atrial Fibrillation, Asthma, Coronary Artery Disease (CAD)

, COPD, CVA/TIA, GERD/Reflux, GI Bleed, Hyperlipidemia, Hypertension, Mitral 

Valve Prolapse (MVP), Musculoskeletal Disorder, Osteoarthritis (OA), 

Respiratory Disorder, Vascular Disorder


Additional Past Medical History / Comment(s): diverticulitits


Last Myocardial Infarction Date:: unknown


History of Any Multi-Drug Resistant Organisms: None Reported


Past Surgical History: Cholecystectomy, Heart Catheterization With Stent, 

Hysterectomy, Orthopedic Surgery


Additional Past Surgical History / Comment(s): carotid, back sx


Past Anesthesia/Blood Transfusion Reactions: No Reported Reaction


Date of Last Stent Placement:: 2011


Past Psychological History: No Psychological Hx Reported


Smoking Status: Former smoker


Past Alcohol Use History: None Reported


Past Drug Use History: None Reported





- Past Family History


  ** Son(s)


Family Medical History: No Reported History





  ** Daughter(s)


Family Medical History: No Reported History





  ** Mother


Family Medical History: Congestive Heart Failure (CHF)





  ** Father


Family Medical History: Cancer


Additional Family Medical History / Comment(s): Bone ca





  ** Sister(s)


Family Medical History: Coronary Artery Disease (CAD)





  ** Brother(s)


Family Medical History: Coronary Artery Disease (CAD), Myocardial Infarction (MI

)





Medications and Allergies


 Home Medications











 Medication  Instructions  Recorded  Confirmed  Type


 


Montelukast Sodium [Singulair] 10 mg PO HS 09/20/14 04/04/17 History


 


ALPRAZolam [Xanax] 0.5 mg PO HS 12/05/15 04/04/17 History


 


Apixaban [Eliquis] 2.5 mg PO BID 12/05/15 04/04/17 History


 


Metoprolol Tartrate [Lopressor] 25 mg PO BID 12/05/15 04/04/17 History


 


Nitroglycerin Sl Tabs [Nitrostat] 0.4 mg SUBLINGUAL Q5M PRN 12/05/15 04/04/17 

History


 


Atorvastatin [Lipitor] 40 mg PO HS 12/06/15 04/04/17 History


 


Ergocalciferol [Vitamin D2 50,000 unit PO Q7D 03/15/17 04/04/17 History





(DRISDOL)]    


 


Ipratropium-Albuterol Nebulize 3 ml INHALATION RT-QID PRN 03/15/17 04/04/17 

History





[Duoneb 0.5 mg-3 mg/3 ml Soln]    


 


Melatonin 3 mg PO HS 03/15/17 04/04/17 History


 


Multivitamins, Thera [Multivitamin 1 tab PO DAILY 03/15/17 04/04/17 History





(formulary)]    


 


Omeprazole 40 mg PO DAILY 03/15/17 04/04/17 History


 


amLODIPine [Norvasc] 10 mg PO DAILY 03/15/17 04/04/17 History


 


hydrALAZINE HCL [Apresoline] 75 mg PO BID 03/15/17 04/04/17 History


 


oxyCODONE HCL 20 mg PO TID 03/15/17 04/04/17 History


 


Aspirin 81 mg PO DAILY 04/04/17 04/04/17 History


 


Furosemide [Lasix] 40 mg PO DAILY 04/04/17 04/04/17 History


 


Losartan Potassium [Cozaar] 100 mg PO DAILY 04/04/17 04/04/17 History


 


Potassium Chloride ER [K-Dur 20] 20 meq PO DAILY 04/04/17 04/04/17 History











 Allergies











Allergy/AdvReac Type Severity Reaction Status Date / Time


 


diphenhydramine Allergy  Unknown Verified 04/04/17 23:31





[From Benadryl]     


 


latex Allergy  Rash/Hives Verified 04/04/17 23:31


 


morphine Allergy  Rash/Hives Verified 04/04/17 23:31


 


pregabalin [From Lyrica] Allergy  Unknown Verified 04/04/17 23:31


 


Sulfa (Sulfonamide Allergy  Unknown Verified 04/04/17 23:31





Antibiotics)     














Physical Exam


Vitals: 


 Vital Signs











  Temp Pulse Resp BP Pulse Ox


 


 04/05/17 07:00   88  16  127/63  94 L


 


 04/05/17 06:00   79  12  122/57  97


 


 04/05/17 05:00   76  12  121/57  95


 


 04/05/17 04:00  97.2 F L  90  12  109/53  94 L


 


 04/05/17 03:00   84  11 L  116/55  91 L


 


 04/05/17 02:00   84  13  118/51  93 L


 


 04/05/17 01:30   94  16  118/61  92 L


 


 04/05/17 01:00   81  23  109/46  92 L


 


 04/05/17 00:30   87  12  106/50  91 L


 


 04/05/17 00:00  98.1 F  91  13  113/48  93 L


 


 04/04/17 23:30   101 H  14  133/59  94 L


 


 04/04/17 23:00   95  23  134/60  95


 


 04/04/17 22:30   99  12  138/59  95


 


 04/04/17 22:00   98  10 L  122/50  92 L


 


 04/04/17 21:30  98.2 F  100  22  103/75  93 L


 


 04/04/17 21:26   100    88 L


 


 04/04/17 20:05  97.7 F  95  20  128/59 


 


 04/04/17 18:54   98  20  124/63  96


 


 04/04/17 18:22  98.3 F  94  18  114/54  99


 


 04/04/17 17:52  98.3 F  94  18  103/59  99


 


 04/04/17 17:42  98.4 F  92  20  120/55  99


 


 04/04/17 17:12  98.3 F  87  20  131/56  100


 


 04/04/17 16:35  98.5 F  92  24  106/56  99


 


 04/04/17 16:05  98.3 F  93  16  116/62  93 L


 


 04/04/17 16:03  98.3 F  93  16  116/62  94 L


 


 04/04/17 15:55  98.1 F  100  18  108/62  93 L


 


 04/04/17 15:47   90   








 Intake and Output











 04/04/17 04/05/17 04/05/17





 22:59 06:59 14:59


 


Intake Total 1620 450.0 190


 


Output Total 400 390 30


 


Balance 1220 60.0 160


 


Intake:   


 


  Amount of Fluid Infused ( 900  





  ml)   


 


  Intake, IV Titration 100 450.0 190





  Amount   


 


    Magnesium Sulfate-D5w Pmx   100





    1 gm In Dextrose/Water 1   





    100ml.bag @ 100 mls/hr   





    IVPB Q1H [facility] Rx#:   





    [id number]   


 


    Piperacillin-Tazobactam 3  50.0 





    .375 gm In Dextrose/Water   





    1 50ml.bag @ 12.5 mls/hr   





    IVPB Q8HR [facility] Rx#:   





    [id number]   


 


    Sodium Chloride 0.9% 1,  300 90





    000 ml @ 100 mls/hr IV .   





    Q10H [facility] Rx#:[id number]   


 


    Sodium Chloride 0.9% 1, 100 100 





    000 ml @ 100 mls/hr IV .   





    Q10H [facility] Rx#:[id number]   


 


  Blood Product 620  


 


    Rc As-1  Unit 310  





    [id number]   


 


    Rc Pheresis 2 As3  Unit 310  





    [id number]   


 


Output:   


 


  Urine 400 390 30


 


Other:   


 


  Voiding Method Indwelling Catheter Indwelling Catheter 


 


  Weight  73.9 kg 











General appearance: The patient is alert, oriented, in no acute distress.


HET: Head is normocephalic and atraumatic.  Pupils are equal and reactive.  

Oropharynx is clear without lesions.


Neck: Supple without lymphadenopathy.  Trachea midline.


Heart: S1 S2.  Regular rate and rhythm.


Lungs: No crackles or wheezes are heard.


Abdomen: Soft, nontender, nondistended with  bowel sounds.  No peritoneal 

signs.  No palpable organomegaly or masses.


Extremities: Normal skin color and turgor.  No cyanosis, rash, ulceration, 

clubbing, or edema.  Radial and pedal pulses are 2/4 bilaterally.


Neurological: No focal deficits.  Strength and sensation are grossly intact.








Results


CBC & Chem 7: 


 04/05/17 04:53





 04/05/17 04:53


Labs: 


 Abnormal Lab Results - Last 24 Hours (Table)











  04/04/17 04/04/17 04/04/17 Range/Units





  16:05 21:30 23:30 


 


WBC     (3.8-10.6)  k/uL


 


RBC     (3.80-5.40)  m/uL


 


Hgb     (11.4-16.0)  gm/dL


 


Hct     (34.0-46.0)  %


 


RDW     (11.5-15.5)  %


 


Plt Count     (150-450)  k/uL


 


Neutrophils # (Manual)     (1.3-7.7)  k/uL


 


Lymphocytes # (Manual)     (1.0-4.8)  k/uL


 


BUN     (7-17)  mg/dL


 


Creatinine     (0.52-1.04)  mg/dL


 


Glucose     (74-99)  mg/dL


 


POC Glucose (mg/dL)   133 H   (75-99)  mg/dL


 


Calcium     (8.4-10.2)  mg/dL


 


Phosphorus     (2.5-4.5)  mg/dL


 


Total Bilirubin     (0.2-1.3)  mg/dL


 


AST     (14-36)  U/L


 


ALT     (9-52)  U/L


 


Troponin I    0.141 H*  (0.000-0.034)  ng/mL


 


Total Protein     (6.3-8.2)  g/dL


 


Albumin     (3.5-5.0)  g/dL


 


Urine Protein     (Negative)  


 


Ur Oxycodone Screen  Detected H    (NotDetected)  














  04/05/17 04/05/17 04/05/17 Range/Units





  04:15 04:53 04:53 


 


WBC   14.0 H   (3.8-10.6)  k/uL


 


RBC   3.33 L   (3.80-5.40)  m/uL


 


Hgb   8.7 L D   (11.4-16.0)  gm/dL


 


Hct   26.8 L   (34.0-46.0)  %


 


RDW   18.7 H   (11.5-15.5)  %


 


Plt Count   116 L   (150-450)  k/uL


 


Neutrophils # (Manual)   13.5 H   (1.3-7.7)  k/uL


 


Lymphocytes # (Manual)   0.4 L   (1.0-4.8)  k/uL


 


BUN    56 H  (7-17)  mg/dL


 


Creatinine    1.40 H  (0.52-1.04)  mg/dL


 


Glucose    123 H  (74-99)  mg/dL


 


POC Glucose (mg/dL)     (75-99)  mg/dL


 


Calcium    7.9 L  (8.4-10.2)  mg/dL


 


Phosphorus    5.1 H  (2.5-4.5)  mg/dL


 


Total Bilirubin    2.3 H  (0.2-1.3)  mg/dL


 


AST    862 H  (14-36)  U/L


 


ALT    1251 H  (9-52)  U/L


 


Troponin I     (0.000-0.034)  ng/mL


 


Total Protein    4.7 L  (6.3-8.2)  g/dL


 


Albumin    2.5 L  (3.5-5.0)  g/dL


 


Urine Protein  Trace H    (Negative)  


 


Ur Oxycodone Screen     (NotDetected)  














  04/05/17 Range/Units





  04:53 


 


WBC   (3.8-10.6)  k/uL


 


RBC   (3.80-5.40)  m/uL


 


Hgb   (11.4-16.0)  gm/dL


 


Hct   (34.0-46.0)  %


 


RDW   (11.5-15.5)  %


 


Plt Count   (150-450)  k/uL


 


Neutrophils # (Manual)   (1.3-7.7)  k/uL


 


Lymphocytes # (Manual)   (1.0-4.8)  k/uL


 


BUN   (7-17)  mg/dL


 


Creatinine   (0.52-1.04)  mg/dL


 


Glucose   (74-99)  mg/dL


 


POC Glucose (mg/dL)   (75-99)  mg/dL


 


Calcium   (8.4-10.2)  mg/dL


 


Phosphorus   (2.5-4.5)  mg/dL


 


Total Bilirubin   (0.2-1.3)  mg/dL


 


AST   (14-36)  U/L


 


ALT   (9-52)  U/L


 


Troponin I  0.124 H*  (0.000-0.034)  ng/mL


 


Total Protein   (6.3-8.2)  g/dL


 


Albumin   (3.5-5.0)  g/dL


 


Urine Protein   (Negative)  


 


Ur Oxycodone Screen   (NotDetected)  














Assessment and Plan


(1) Acute GI bleeding


Narrative/Plan: 


Suspect chronic GI blood loss exacerbated by antiplatelet medications possible 

obscure bleeding from small bowel angiectasia AVM.


Status: Acute   





(2) Elevated liver enzymes


Narrative/Plan: 


Acute hepatocellular injury possible ischemic hepatitis with mild hypotension 

on admission.  Extrahepatic process cannot be entirely excluded.  Elevated 

bilirubin could be secondary to acute GI bleed.  History of mild to moderate 

extrahepatic and central intrahepatic biliary dilatation per CT imaging June 2016.  No obstructing calculus or mass clearly seen at that time.


Status: Acute   





(3) Acute blood loss anemia


Status: Acute   





(4) Hemoptysis


Narrative/Plan: 


suspected alveolar haemorrhage


Status: Acute   





(5) Elevated troponin


Status: Acute   


Plan: 


1.  Clear liquid diet. Case discussed with Dr. Solano. Possible 

bronchoscopy later this week. 


2.  Consideration for repeat EGD if patient manifests active bleeding.  At this 

time continue to observe.


3.  MRCP if liver enzymes do not improve.


4.  We'll continue to follow with you.


5.  GI prophylaxis IV Protonix. Monitor CBC.


6.  Hold antiplatelet medications.





Thank you for this kind referral and the opportunity to participate in the care 

of your patient.  This consultation was discussed with Dr. Mejia.  The 

impression and plan of care have been directed as dictated.

## 2017-04-05 NOTE — CONS
DATE OF CONSULTATION:  04/05/2017



Reason for consult is renal failure. 



HISTORY OF PRESENT ILLNESS: Patient is a 71-year-old white female who 

was admitted to the hospital with history of black stools. Patient 

stated that she also vomited some blood. She has been worked up for GI 

bleed previously with a capsule endoscopy, which did not reveal any 

obvious cause of bleeding. Patient's hemoglobin was at 6.8 g/dL on 

admission. She has been transfused packed RBCs which is now at 8.7 

g/dL.  Serum creatinine has been at 1.4 and previously she was down to 

1.0 and 0.9 mg/dL on 3/16 and 3/17/2017.  Patient has not been 

significantly hypotensive, although systolic blood pressure has been 

in the teens recently. She is maintained on IV fluids, which we can 

continue. There are no nephrotoxic agents on board. Patient has good 

urine output.  



PAST MEDICAL HISTORY: Gastrointestinal bleed previously, A. fib, 

asthma, coronary artery disease, COPD, CVA/TIA, gastroesophageal 

reflux disease, hyperlipidemia, hypertension, mitral valve prolapse, 

osteoarthritis, diverticulitis.  



PAST SURGICAL HISTORY: Cholecystectomy, cardiac catheterization, 

coronary stent placement, hysterectomy, carotid surgery.    



Social history is positive for smoking. No history of drug abuse or 

alcohol abuse.  



Medications at home prior to admission included Singulair, Xanax, 

Eliquis, Lopressor, Nitrostat, Norvasc, hydralazine, Lasix, Cozaar, 

potassium, Carafate.  



Allergies include SULFA, LATEX, MORPHINE, LYRICA, BENADRYL. 



REVIEW OF SYSTEMS: As per HPI. Other systems negative. 



On examination, patient is comfortable, awake, alert, oriented x3. She 

is not in any acute distress.  

Blood pressure is 117/54, heart rate 89 per minute. She is afebrile. 

Examination of the heart, S1 and S2. Examination of the lungs, 

bilateral breath sounds are heard.  

Abdomen is soft, nontender. Examination of lower extremities shows no 

evidence of edema. CNS exam is grossly intact. Patient is moving all 4 

extremities.  



Labs show sodium 138, potassium 4.0, BUN 56, serum creatinine 1.4. 

Hemoglobin 8.7 g/dL, calcium 7.9. Phosphorus 5.1. UA shows trace 

protein, otherwise completely clear.  



ASSESSMENT: 

1. Acute kidney injury, most likely prerenal, serum creatinine staying 

at about 1.4 mg/dL. She had been maintained on ACE inhibitors prior to 

admission and blood pressure had been slightly on the lower side. 

Continue with IV fluids for now.  

2. Gastrointestinal bleed, status post previous work-up prior to 

admission including capsule endoscopy, which did not reveal any source 

of obvious bleeding.  

3. Coronary artery disease, status post coronary artery stenting. 

4. Hemoptysis as noted in the note; however, when I asked the patient, 

she stated she vomited blood. Therefore, it is not clear if there was 

actual hemoptysis. Patient is maintained on Eliquis, which is now on 

hold. She is being followed by Pulmonary. There was a question of 

pulmonary renal syndrome. However, her UA is completely benign and the 

anti-double-stranded DNA that was ordered is negative. Will check if 

the ALVA and the (    )levels were ordered as well, although I doubt an 

underlying GN as her  UA is quite benign. 



Thank you for this consultation. Will continue to follow the patient 

with you during her hospitalization.

## 2017-04-05 NOTE — XR
EXAMINATION TYPE: XR chest 1V

 

DATE OF EXAM: 4/5/2017 6:55 AM

 

HISTORY: sob.

 

REFERENCE: Previous study dated 4/4/2017.

 

FINDINGS: There has been improvement in aeration of both lungs. The heart remains enlarged. Pleural s
paces are clear.

 

IMPRESSION: 

1. CARDIOMEGALY.

2. IMPROVED AERATION, BOTH LUNGS.

## 2017-04-06 LAB
ANION GAP SERPL CALC-SCNC: 8 MMOL/L
BUN SERPL-SCNC: 53 MG/DL (ref 7–17)
C-ANCA TITR SER: (no result) TITER
CALCIUM SPEC-MCNC: 8.2 MG/DL (ref 8.4–10.2)
CELLS COUNTED: 100
CH: 25.2
CHCM: 30.3
CHLORIDE SERPL-SCNC: 100 MMOL/L (ref 98–107)
CO2 SERPL-SCNC: 29 MMOL/L (ref 22–30)
ERYTHROCYTE [DISTWIDTH] IN BLOOD BY AUTOMATED COUNT: 3.15 M/UL (ref 3.8–5.4)
ERYTHROCYTE [DISTWIDTH] IN BLOOD: 18.1 % (ref 11.5–15.5)
GLUCOSE SERPL-MCNC: 126 MG/DL (ref 74–99)
HCT VFR BLD AUTO: 26.1 % (ref 34–46)
HDW: 4.39
HGB BLD-MCNC: 8.4 GM/DL (ref 11.4–16)
MAGNESIUM SPEC-SCNC: 2.1 MG/DL (ref 1.6–2.3)
MCH RBC QN AUTO: 26.6 PG (ref 25–35)
MCHC RBC AUTO-ENTMCNC: 32.1 G/DL (ref 31–37)
MCV RBC AUTO: 82.9 FL (ref 80–100)
NON-AFRICAN AMERICAN GFR(MDRD): 43
P-ANCA TITR SER IF: (no result) TITER
PHOSPHATE SERPL-MCNC: 4.2 MG/DL (ref 2.5–4.5)
POTASSIUM SERPL-SCNC: 3.7 MMOL/L (ref 3.5–5.1)
SODIUM SERPL-SCNC: 137 MMOL/L (ref 137–145)
WBC # BLD AUTO: 9.9 K/UL (ref 3.8–10.6)
WBC (PEROX): 10.48

## 2017-04-06 RX ADMIN — DEXTROSE MONOHYDRATE SCH MLS/HR: 5 INJECTION, SOLUTION INTRAVENOUS at 00:15

## 2017-04-06 RX ADMIN — BUDESONIDE SCH MG: 0.5 INHALANT ORAL at 21:35

## 2017-04-06 RX ADMIN — Medication SCH MG: at 23:13

## 2017-04-06 RX ADMIN — BUDESONIDE SCH MG: 0.5 INHALANT ORAL at 07:48

## 2017-04-06 RX ADMIN — Medication SCH MG: at 12:00

## 2017-04-06 RX ADMIN — OXYCODONE HYDROCHLORIDE SCH MG: 20 TABLET, FILM COATED, EXTENDED RELEASE ORAL at 23:14

## 2017-04-06 RX ADMIN — IPRATROPIUM BROMIDE AND ALBUTEROL SULFATE PRN ML: .5; 3 SOLUTION RESPIRATORY (INHALATION) at 13:55

## 2017-04-06 RX ADMIN — POTASSIUM CHLORIDE SCH MEQ: 20 TABLET, EXTENDED RELEASE ORAL at 07:49

## 2017-04-06 RX ADMIN — THERA TABS SCH EACH: TAB at 07:50

## 2017-04-06 RX ADMIN — DEXTROSE MONOHYDRATE SCH MLS/HR: 5 INJECTION, SOLUTION INTRAVENOUS at 09:03

## 2017-04-06 RX ADMIN — METOPROLOL TARTRATE SCH MG: 25 TABLET, FILM COATED ORAL at 07:49

## 2017-04-06 RX ADMIN — METOPROLOL TARTRATE SCH MG: 25 TABLET, FILM COATED ORAL at 23:12

## 2017-04-06 RX ADMIN — IPRATROPIUM BROMIDE AND ALBUTEROL SULFATE PRN ML: .5; 3 SOLUTION RESPIRATORY (INHALATION) at 07:48

## 2017-04-06 RX ADMIN — METHYLPREDNISOLONE SODIUM SUCCINATE SCH MG: 125 INJECTION, POWDER, FOR SOLUTION INTRAMUSCULAR; INTRAVENOUS at 07:49

## 2017-04-06 RX ADMIN — OXYCODONE HYDROCHLORIDE SCH MG: 20 TABLET, FILM COATED, EXTENDED RELEASE ORAL at 07:48

## 2017-04-06 RX ADMIN — FUROSEMIDE SCH MG: 40 TABLET ORAL at 07:50

## 2017-04-06 RX ADMIN — IPRATROPIUM BROMIDE AND ALBUTEROL SULFATE PRN ML: .5; 3 SOLUTION RESPIRATORY (INHALATION) at 21:35

## 2017-04-06 RX ADMIN — SUCRALFATE SCH GM: 1 TABLET ORAL at 07:49

## 2017-04-06 RX ADMIN — CEFAZOLIN SCH MLS/HR: 330 INJECTION, POWDER, FOR SOLUTION INTRAMUSCULAR; INTRAVENOUS at 05:20

## 2017-04-06 RX ADMIN — CEFAZOLIN SCH MLS/HR: 330 INJECTION, POWDER, FOR SOLUTION INTRAMUSCULAR; INTRAVENOUS at 09:03

## 2017-04-06 RX ADMIN — DEXTROSE MONOHYDRATE SCH MLS/HR: 5 INJECTION, SOLUTION INTRAVENOUS at 15:38

## 2017-04-06 RX ADMIN — PANTOPRAZOLE SODIUM SCH MG: 40 INJECTION, POWDER, FOR SOLUTION INTRAVENOUS at 07:48

## 2017-04-06 RX ADMIN — OXYCODONE HYDROCHLORIDE SCH MG: 20 TABLET, FILM COATED, EXTENDED RELEASE ORAL at 15:38

## 2017-04-06 RX ADMIN — METHYLPREDNISOLONE SODIUM SUCCINATE SCH: 125 INJECTION, POWDER, FOR SOLUTION INTRAMUSCULAR; INTRAVENOUS at 07:21

## 2017-04-06 RX ADMIN — MONTELUKAST SODIUM SCH MG: 10 TABLET, FILM COATED ORAL at 23:14

## 2017-04-06 RX ADMIN — IPRATROPIUM BROMIDE AND ALBUTEROL SULFATE PRN ML: .5; 3 SOLUTION RESPIRATORY (INHALATION) at 01:59

## 2017-04-06 NOTE — P.PN
Subjective





This is a 77-year-old  female one of my patient with a previous 

medical history significant for CAD post-PCI of the LAD, hypertension and 

hypertensive cardio vascular disease with left ventricular hypertrophy, 

hyperlipidemia, history of the for arterial occlusive disease, carotid artery 

disease status post right carotid endarterectomy with about 70% stenosis of 

bilateral carotid arteries, has been under the care of cardiology, pulmonary 

medicine, vascular surgery, gastroenterology, she was recently hospitalized at 

[facility] in 03/16/2017 after she was admitted for acute 

respiratory failure secondary to acute diastolic heart failure and was seen and 

evaluated by pulmonary and cardiology along with gastroenterology at that time 

she was given 2 units of packed red blood cells and the patient was sent home 

as a matter fact I saw the patient Rocephin the office about a week ago and she 

was feeling a bit better with increased swelling in both lower extremities she 

was placed on Lasix along with potassium supplement, she had laboratory 

evaluation that showed significant anemia patient was scheduled to go for blood 

transfusion yesterday patient was out of it completely and she did not call for 

the transfer, her daughter was with her the bedside and she stated that nobody 

called her to tell her that she needed to go to have a blood transfusion, 

patient was quite out of it yesterday and she has not been eating much and she 

has not been taking her medicine over the last 24 hours today patient felt 

extremely weak and she was spitting up blood over the last 48 hours she ended 

up coming to the ER and she was seen in the ER and her hemoglobin was 6.8, and 

patient was found to have a left upper lobe of 100 along with hemoptysis she 

was admitted to the intensive care unit because she was hypotensive for acute 

respiratory failure secondary to left upper lobe pneumonia/hemoptysis with the 

acute exacerbation of COPD.





4/5: Patient is followed by Dr. Mendez.  She is maintained in the intensive 

care unit.  She does complain of coughing up blood today.  She is status post 2 

units packed RBC with repeat hemoglobin of 8.7.  BUN 56 and creatinine 1.4.  

She is continued on IV antibiotics with Zosyn and Levaquin.  Solu-Medrol 60 mg 

every 12 hours. Patient has been seen by gastroenterology with plan for clear 

liquid diet, consider EGD if active bleeding, MRCP if liver enzymes do not 

improve.  Abdominal ultrasound reveals right renal cyst.  No evidence of solid 

renal mass or obstruction.  No dilated ducts.  Repeat chest x-ray shows 

cardiomegaly with improved aeration of both lungs





4/6: Patient states that she is feeling better today cough is much better she 

is coughing very little at this point.  Dr. Mendez does not plan for 

bronchoscopy at this point.  Solu-Medrol to oral prednisone.  She denies any 

dizziness or lightheadedness.  She has not had a bowel movement.  Hemoglobin is 

at 8.4, BUN 53 and creatinine 1.23.  Patient does not want a nicotine patch.  

She is anxious to be discharged.  Plan to continue to monitor her overnight and 

possible discharge in the next 24-48 hours.





Objective





- Vital Signs


Vital signs: 


 Vital Signs











Temp  96.8 F L  04/06/17 11:28


 


Pulse  69   04/06/17 11:28


 


Resp  18   04/06/17 11:28


 


BP  137/86   04/06/17 11:28


 


Pulse Ox  94 L  04/06/17 11:28








 Intake & Output











 04/05/17 04/06/17 04/06/17





 18:59 06:59 18:59


 


Intake Total 1250  520


 


Output Total 1055 4250 250


 


Balance 195 -4250 270


 


Weight 73.9 kg 72.8 kg 


 


Intake:   


 


  Intake, IV Titration 890  





  Amount   


 


    Magnesium Sulfate-D5w Pmx 100  





    1 gm In Dextrose/Water 1   





    100ml.bag @ 100 mls/hr   





    IVPB Q1H CORBIN Rx#:   





    [id number]   


 


    Sodium Chloride 0.9% 1, 390  





    000 ml @ 100 mls/hr IV .   





    Q10H CORBIN Rx#:[id number]   


 


    Sodium Chloride 0.9% 1, 400  





    000 ml @ 100 mls/hr IV .   





    Q10H STA Rx#:[id number]   


 


  Oral 360  520


 


Output:   


 


  Urine 1055 4250 250


 


    Uretheral (Ledbetter)  1500 


 


Other:   


 


  Voiding Method Indwelling Catheter Indwelling Catheter Toilet














- Exam





General appearance: average body habitus, mild distress





- EENT


Eyes: anicteric sclerae, EOMI, PERRLA, no ptosis, no scleral icterus, normal 

appearance


ENT: hard of hearing, NA/AT, normal oropharynx, no thrush


Ears: bilateral: normal





- Neck


Neck: no lymphadenopathy, normal ROM, no rigidity, no stridor, no thyromegaly


Carotids: bilateral: upstroke delayed, bruit present


Thyroid: negative: normal size





- Respiratory


Respiratory: left: rhonchi, wheezing, prolonged expiration, bilateral: 

diminished





- Cardiovascular


Rhythm: regular


Heart sounds: normal: S1, S2


Abnormal Heart Sounds: systolic murmur, no S3 Gallop, no S4 Gallop





- Gastrointestinal


General gastrointestinal: distended, normal bowel sounds, soft, no splenomegaly

, no tenderness, no umbilical hernia, no ventral hernia





- Integumentary


Integumentary: decreased turgor, pale





- Neurologic


Neurologic: CNII-XII intact





- Musculoskeletal


Musculoskeletal: generalized weakness, strength equal bilaterally





- Psychiatric


Psychiatric: A&O x's 3, appropriate affect, intact judgment & insight








- Labs


CBC & Chem 7: 


 04/06/17 06:06





 04/06/17 06:06


Labs: 


 Abnormal Lab Results - Last 24 Hours (Table)











  04/06/17 04/06/17 Range/Units





  06:06 06:06 


 


RBC  3.15 L   (3.80-5.40)  m/uL


 


Hgb  8.4 L   (11.4-16.0)  gm/dL


 


Hct  26.1 L   (34.0-46.0)  %


 


RDW  18.1 H   (11.5-15.5)  %


 


Plt Count  88 L   (150-450)  k/uL


 


Neutrophils # (Manual)  9.7 H   (1.3-7.7)  k/uL


 


Lymphocytes # (Manual)  0.1 L   (1.0-4.8)  k/uL


 


BUN   53 H  (7-17)  mg/dL


 


Creatinine   1.23 H  (0.52-1.04)  mg/dL


 


Glucose   126 H  (74-99)  mg/dL


 


Calcium   8.2 L  (8.4-10.2)  mg/dL








 Microbiology - Last 24 Hours (Table)











 04/05/17 04:15 Urine Culture - Final





 Urine,Catheterized 














Assessment and Plan


Plan: 





1.  Acute hypoxic respiratory failure secondary to hemoptysis with left upper 

lobe pneumonia with sepsis and acute exacerbation of COPD.  Start the patient 

on Levaquin 500 mg IV piggyback every 48 hours, Zosyn 2.25 g IV piggyback every 

6 hours, Mucinex 600 mg orally twice every day, prednisone, DuoNeb 3 mL 

nebulization 4 times every day, oxygen support, Pulmicort 0.5 mg nebulization 

twice every day.  pulmonary consultation from , admit the patient to 

the intensive care unit.





2.  Hemoptysis of unclear etiology could be related to left upper lobe 

pneumonia.  IV anabiotic, IV steroid, nebulized treatment, oxygen support, 

patient did go for bronchoscopy for possible malignancy.





3.  Acute blood loss anemia secondary to possible GI bleed as well as 

hemoptysis.  Status post transfusion of 2 units of packed red blood cells.





4. CAD post-PCI of the LAD.  Continue metoprolol 25 mg orally twice every day, 

Lipitor 40 mg orally once every day, losartan 50 mg orally once every day, 

patient was taken off her aspirin and Eliquis for now because of GI bleed.





5.  History of CVA of the left frontal lobe.  DC aspirin and Eliquis for now.





6.  Carotid artery disease as well as PAD.  Currently under the care of 

vascular surgery continue Lipitor 40 mg orally once every day for second or 

prevention patient cannot go on anticoagulation until after GI workup and 

hemoptysis workup.





7.  Left upper lobe pneumonia.  IV anabiotic Levaquin and Zosyn, IV steroids, 

nebulized treatment, oxygen support, may need bronchoscopy.





8.  Hypertension and hypertensive cardiovascular disease.  Continue losartan 50 

mg orally once every day, metoprolol 25 mg orally twice every day, hydralazine 

75 mg orally 2 times every day.





9.  Hyperlipidemia.  Continue Lipitor 40 mg orally once every day.





10.  ALLERGIC rhinitis.  Continue singular 10 mg at bedtime.





11.  Lumbar degenerative disc disease with spondylolisthesis and facet 

arthropathy.  Post epidural injection, patient is to be maintained on oxycodone 

20 mg orally 3 times every day.





12. PAF.will continue metoprolol 25 mg orally twice every day, hold Eliquis.





13.  DVT prophylaxis.  Patient will be taken off Eliquis for now she will have 

knee-high KEVIN hose as well as SCD's.





14.  GI prophylaxis.  Continue Protonix 40 mg IV push every 24 hours.





15.  Patient is full code.





16.  Admit to inpatient.  Estimate length of stay 2 midnights.





17.  Prognosis is guarded.





Discharge plan: Return home





Impression and plan of care have been directed as dictated by the signing 

physician.  Danielle Herbert nurse practitioner acting as scribe for signing 

physician.


Time with Patient: Greater than 30

## 2017-04-06 NOTE — P.PN
Subjective


Principal diagnosis: 





Acute GI bleed





Patient seen and examined.  Patient states she is feeling much better today.  

She denies any recurrent hemoptysis.  She is currently on room air.  She is 

sitting up eating lunch.  She is hoping to go home soon.





Objective





- Vital Signs


Vital signs: 


 Vital Signs











Temp  96.8 F L  04/06/17 11:28


 


Pulse  69   04/06/17 11:28


 


Resp  18   04/06/17 11:28


 


BP  137/86   04/06/17 11:28


 


Pulse Ox  94 L  04/06/17 11:28








 Intake & Output











 04/05/17 04/06/17 04/06/17





 18:59 06:59 18:59


 


Intake Total 1250  520


 


Output Total 1055 4250 250


 


Balance 195 -4250 270


 


Weight 73.9 kg 72.8 kg 


 


Intake:   


 


  Intake, IV Titration 890  





  Amount   


 


    Magnesium Sulfate-D5w Pmx 100  





    1 gm In Dextrose/Water 1   





    100ml.bag @ 100 mls/hr   





    IVPB Q1H CORBIN Rx#:   





    [id number]   


 


    Sodium Chloride 0.9% 1, 390  





    000 ml @ 100 mls/hr IV .   





    Q10H CORBIN Rx#:[id number]   


 


    Sodium Chloride 0.9% 1, 400  





    000 ml @ 100 mls/hr IV .   





    Q10H STA Rx#:[id number]   


 


  Oral 360  520


 


Output:   


 


  Urine 1055 4250 250


 


    Uretheral (Ledbetter)  1500 


 


Other:   


 


  Voiding Method Indwelling Catheter Indwelling Catheter Toilet














- Exam





Gen.: Patient is alert and oriented 3, she does appear to be acutely ill


Cardiovascular: Regular rate and rhythm, S1/S2


Lungs: Diminished breath sounds bilaterally, improved aeration


Abdomen: Soft nontender nondistended positive bowel sounds


Extremities: 1-2+ pitting edema





- Labs


CBC & Chem 7: 


 04/06/17 06:06





 04/06/17 06:06


Labs: 


 Abnormal Lab Results - Last 24 Hours (Table)











  04/06/17 04/06/17 Range/Units





  06:06 06:06 


 


RBC  3.15 L   (3.80-5.40)  m/uL


 


Hgb  8.4 L   (11.4-16.0)  gm/dL


 


Hct  26.1 L   (34.0-46.0)  %


 


RDW  18.1 H   (11.5-15.5)  %


 


Plt Count  88 L   (150-450)  k/uL


 


Neutrophils # (Manual)  9.7 H   (1.3-7.7)  k/uL


 


Lymphocytes # (Manual)  0.1 L   (1.0-4.8)  k/uL


 


BUN   53 H  (7-17)  mg/dL


 


Creatinine   1.23 H  (0.52-1.04)  mg/dL


 


Glucose   126 H  (74-99)  mg/dL


 


Calcium   8.2 L  (8.4-10.2)  mg/dL








 Microbiology - Last 24 Hours (Table)











 04/05/17 04:15 Urine Culture - Preliminary





 Urine,Catheterized 














Assessment and Plan


Plan: 





Acute hypoxic respiratory failure


Left lung infiltrate: Pneumonia versus pulmonary hemorrhage


Acute blood loss anemia


Acute GI bleed


Acute exacerbation of COPD


Hemoptysis


Acute kidney injury


Coagulopathy secondary to novel anticoagulant


Sepsis with SIRS present on admission


Recent E. coli UTI


Elevated transaminases of unclear etiology


History of congestive heart failure, diastolic, ejection fraction 45-50%


Severe pulmonary hypertension with an RVSP of 58 mmHg


Chronic kidney disease stage III


History of coronary artery disease


Valvular heart disease


History of CVA


Dyslipidemia


History of hypertension





O2 to maintain saturation greater than or equal to 88%


Monitor hemoglobin, transfuse for hemoglobin less than 7


Hold all anticoagulation


IV fluid hydration


Antibiotics: Levaquin and Zosyn


Bronchodilators


Pulmicort


Prednisone taper


Blood, urine, sputum cultures


ANCA, AntiGBM, Complement, AntiDS DNA


No nephrotoxins


Avoid hypotension


GI prophylaxis with Protonix


DVT prophylaxis with SCDs


Gastroenterology consult - possible plan for MRCP


Nephrology recs


Monitor CXR, if continued improvement with no recurrent hemoptysis, will defer 

bronchoscopy at this time.

## 2017-04-06 NOTE — CDI
In responding to this query, please exercise your independent professional 
judgment. [facility] [employer] Coding Staff and Clinical Documentation Specialists 
appreciate your 

assistance in clarifying documentation, maintaining compliance with coding 
guidelines, accurately documenting patients condition and capturing severity 
of illness. The fact that a question is asked does not imply that any 
particular answer is desired or expected. Communication forms are a method of 
clarifying documentation and are not made part of the Legal Health Record. 
Thank you in advance for your clarification.

 Last Revision, November 2015



[facility]

[address]

[place] [address]



            Documentation Clarification Form



Date: 4/6/2017 12:33:00 PM

From: Rosie Hopkins

Phone: [phone]

MRN: [id number]

Admit Date: 4/4/2017 3:35:00 PM

Patient Name: Teresa Davis

Visit Number: [id number]





Dr. Kinjal Hood and Danielle Herbert, NP



'Sepsis with SIRS present on admission' is documented by pulmonary.



History/Risk Factors: 

     Pneumonia



Clinical Indicators: 

     WBC on 4/4 was 17.4            

     Lactic acid on 4/4 was 2.2

     Vitals signs on admission: temp 97.8, hr 96, rr 18, bp 111/54, sats 80% on 
room air

     'Acute hypoxic respiratory failure' per attending

     'Acute kidney injury' per Nephrology

     'Coagulopathy' per Pulmonary



Treatment:

     Antibiotics:  IV Levaquin, IV Zosyn

     IV fluid bolus

     ICU monitoring



In your professional opinion, can you please clarify if you agree with these 
findings, whether the condition is POA, and cause, if known?



 Sepsis

 Unable to determine

 Other, please specify

 



Please document in your progress notes and discharge summary in order to 
capture severity of illness and risk of mortality. Include clinical findings 
that support your diagnosis.



            FYI: Press F11 to launch patient chart.



_____ Place X here if this finding has no clinical significance, is not 
applicable or if you are not able to provide any additional documentation.



KARLIE

## 2017-04-06 NOTE — PN
Patient is seen for followup for acute kidney injury. She was admitted 

to the hospital with possible GI bleed. Her creatinine was at 1.4 

mg/dL.  Patient is maintained on IV fluids. Her creatinine is now down 

to 1.2. There was a question of possible hemoptysis, although this is 

unclear.  Baseline serologies were ordered and anti-double-stranded 

DNA is so far negative. I do not see the complement levels or vanco 

levels back yet. UA has been fairly benign with no evidence of 

significant hematuria. Patient has trace protein.  



On examination today, blood pressure is 137/86, heart rate 69 per 

minute. She is afebrile.  

Examination of the heart, S1 and S2. Examination of the lungs, 

bilateral breath sounds are heard.  

Abdomen is soft, nontender. Examination of lower extremities shows no 

evidence of edema. CNS exam is grossly intact.  



Labs show serum creatinine at 1.23, sodium 137, potassium 3.7, 

hemoglobin 8.4 g/dL.  



ASSESSMENT: 

1. Acute kidney injury, prerenal, currently improved. Decrease IV 

fluids and maintain KVO as patient is now eating.  

2. Anemia with evidence of gastrointestinal bleed with stool for 

occult blood being positive. No active bleeding noted at this time. 

Patient has had previous work-up including capsule endoscopy.  



PLAN: Decrease IV fluids. Encourage increased oral intake. No need for 

packed RBC transfusion yet. Follow up on results of remaining 

serologies. I highly doubt underlying GN given the fairly benign 

urinalysis.

## 2017-04-06 NOTE — P.PN
Subjective


Principal diagnosis: 


Anemia GI bleed





71-year-old female with a history of obscure GI bleed iron deficiency anemia 

with recent endoscopic evaluations including small bowel capsule without 

definitive source presents with symptomatic anemia, elevated liver enzymes, and 

melena.  No episodes of melena since admission.  Additionally suspect possible 

alveolar hemorrhage secondary to antiplatelet medications prior to admission.  

Denies abdominal pain.  Feels well.  Hemoglobin 8.4.  Tolerating clear liquids.

  Antiplatelet medications on hold.  Liver enzymes improving yesterday.








Objective





- Vital Signs


Vital signs: 


 Vital Signs











Temp  97.0 F L  04/06/17 08:00


 


Pulse  89   04/06/17 08:00


 


Resp  22   04/06/17 08:00


 


BP  150/63   04/06/17 08:00


 


Pulse Ox  93 L  04/06/17 08:00








 Intake & Output











 04/05/17 04/06/17 04/06/17





 18:59 06:59 18:59


 


Intake Total 1250  520


 


Output Total 1055 4250 


 


Balance 195 -4250 520


 


Weight 73.9 kg 72.8 kg 


 


Intake:   


 


  Intake, IV Titration 890  





  Amount   


 


    Magnesium Sulfate-D5w Pmx 100  





    1 gm In Dextrose/Water 1   





    100ml.bag @ 100 mls/hr   





    IVPB Q1H CORBIN Rx#:   





    [id number]   


 


    Sodium Chloride 0.9% 1, 390  





    000 ml @ 100 mls/hr IV .   





    Q10H CORBIN Rx#:[id number]   


 


    Sodium Chloride 0.9% 1, 400  





    000 ml @ 100 mls/hr IV .   





    Q10H STA Rx#:[id number]   


 


  Oral 360  520


 


Output:   


 


  Urine 1055 4250 


 


    Uretheral (Ledbetter)  1500 


 


Other:   


 


  Voiding Method Indwelling Catheter Indwelling Catheter Toilet














- Exam


General appearance: The patient is alert, oriented, in no acute distress.


HET: Head is normocephalic and atraumatic.  Pupils are equal and reactive.  

Oropharynx is clear without lesions.


Neck: Supple without lymphadenopathy.  Trachea midline.


Heart: S1 S2.  


Lungs: No crackles or wheezes are heard.


Abdomen: Soft, nontender, nondistended with  bowel sounds.  No peritoneal 

signs.  No palpable organomegaly or masses.


Extremities: Normal skin color and turgor.  No cyanosis, rash, ulceration, 

clubbing, or edema.  Radial and pedal pulses are 2/4 bilaterally.


Neurological: No focal deficits.  Strength and sensation are grossly intact.








- Labs


CBC & Chem 7: 


 04/06/17 06:06





 04/06/17 06:06


Labs: 


 Abnormal Lab Results - Last 24 Hours (Table)











  04/05/17 04/06/17 04/06/17 Range/Units





  04:53 06:06 06:06 


 


RBC   3.15 L   (3.80-5.40)  m/uL


 


Hgb   8.4 L   (11.4-16.0)  gm/dL


 


Hct   26.1 L   (34.0-46.0)  %


 


RDW   18.1 H   (11.5-15.5)  %


 


Plt Count   88 L   (150-450)  k/uL


 


Neutrophils # (Manual)   9.7 H   (1.3-7.7)  k/uL


 


Lymphocytes # (Manual)   0.1 L   (1.0-4.8)  k/uL


 


BUN    53 H  (7-17)  mg/dL


 


Creatinine    1.23 H  (0.52-1.04)  mg/dL


 


Glucose    126 H  (74-99)  mg/dL


 


Calcium    8.2 L  (8.4-10.2)  mg/dL


 


Total Bilirubin  2.3 H    (0.2-1.3)  mg/dL


 


Delta Bilirubin  1.0 H    (0.0-0.2)  mg/dL








 Microbiology - Last 24 Hours (Table)











 04/05/17 04:15 Urine Culture - Preliminary





 Urine,Catheterized 














Assessment and Plan


(1) Acute GI bleeding


Narrative/Plan: 


Suspect chronic GI blood loss exacerbated by antiplatelet medications possible 

obscure bleeding from small bowel angiectasia AVM.


Status: Acute   





(2) Elevated liver enzymes


Narrative/Plan: 


Acute hepatocellular injury possible ischemic hepatitis with mild hypotension 

on admission.  Extrahepatic process cannot be entirely excluded.  Elevated 

bilirubin could be secondary to acute GI bleed.  History of mild to moderate 

extrahepatic and central intrahepatic biliary dilatation per CT imaging June 2016.  No obstructing calculus or mass clearly seen at that time.


Status: Acute   





(3) Acute blood loss anemia


Status: Acute   





(4) Hemoptysis


Narrative/Plan: 


suspected alveolar haemorrhage


Status: Acute   





(5) Elevated troponin


Status: Acute   





(6) Iron deficiency anemia


Status: Chronic   


Plan: 


1.  Advance diet.  Iron supplementation recommended.


2.  Monitor CBC.  


3.  Continue GI prophylaxis.


4.  No further GI workup is planned at this time.  Repeat liver enzymes in the 

morning.  We'll continue to follow.





Assessment and plan a care discussed with Dr. Mejia.

## 2017-04-07 LAB
ALP SERPL-CCNC: 112 U/L (ref 38–126)
ALT SERPL-CCNC: 2246 U/L (ref 9–52)
ANION GAP SERPL CALC-SCNC: 9 MMOL/L
APAP SPEC-MCNC: <10 UG/ML
AST SERPL-CCNC: 847 U/L (ref 14–36)
BUN SERPL-SCNC: 46 MG/DL (ref 7–17)
CALCIUM SPEC-MCNC: 8.3 MG/DL (ref 8.4–10.2)
CELLS COUNTED: 200
CH: 25.8
CHCM: 30.4
CHLORIDE SERPL-SCNC: 103 MMOL/L (ref 98–107)
CO2 SERPL-SCNC: 29 MMOL/L (ref 22–30)
ERYTHROCYTE [DISTWIDTH] IN BLOOD BY AUTOMATED COUNT: 3.11 M/UL (ref 3.8–5.4)
ERYTHROCYTE [DISTWIDTH] IN BLOOD: 18.8 % (ref 11.5–15.5)
GLUCOSE SERPL-MCNC: 128 MG/DL (ref 74–99)
HCT VFR BLD AUTO: 26.3 % (ref 34–46)
HDW: 4.16
HEPATITIS B CORE IGM INDEX: 0.05
HEPATITIS B SURFACE AG INDEX: 0.07
HEPATITIS C VIRUS IGG  INDEX: 0.02
HGB BLD-MCNC: 8.2 GM/DL (ref 11.4–16)
MAGNESIUM SPEC-SCNC: 1.9 MG/DL (ref 1.6–2.3)
MCH RBC QN AUTO: 26.5 PG (ref 25–35)
MCHC RBC AUTO-ENTMCNC: 31.3 G/DL (ref 31–37)
MCV RBC AUTO: 84.6 FL (ref 80–100)
NON-AFRICAN AMERICAN GFR(MDRD): 46
PHOSPHATE SERPL-MCNC: 3.7 MG/DL (ref 2.5–4.5)
POTASSIUM SERPL-SCNC: 3 MMOL/L (ref 3.5–5.1)
PROT SERPL-MCNC: 4.9 G/DL (ref 6.3–8.2)
SODIUM SERPL-SCNC: 141 MMOL/L (ref 137–145)
WBC # BLD AUTO: 6.7 K/UL (ref 3.8–10.6)
WBC (PEROX): 7.07

## 2017-04-07 RX ADMIN — FUROSEMIDE SCH MG: 40 TABLET ORAL at 08:34

## 2017-04-07 RX ADMIN — POTASSIUM CHLORIDE SCH MEQ: 20 TABLET, EXTENDED RELEASE ORAL at 08:33

## 2017-04-07 RX ADMIN — IPRATROPIUM BROMIDE AND ALBUTEROL SULFATE PRN ML: .5; 3 SOLUTION RESPIRATORY (INHALATION) at 20:21

## 2017-04-07 RX ADMIN — Medication SCH MG: at 20:05

## 2017-04-07 RX ADMIN — DEXTROSE MONOHYDRATE SCH MLS/HR: 5 INJECTION, SOLUTION INTRAVENOUS at 15:55

## 2017-04-07 RX ADMIN — IPRATROPIUM BROMIDE AND ALBUTEROL SULFATE PRN ML: .5; 3 SOLUTION RESPIRATORY (INHALATION) at 11:51

## 2017-04-07 RX ADMIN — MONTELUKAST SODIUM SCH MG: 10 TABLET, FILM COATED ORAL at 20:05

## 2017-04-07 RX ADMIN — POTASSIUM CHLORIDE SCH MEQ: 20 TABLET, EXTENDED RELEASE ORAL at 15:55

## 2017-04-07 RX ADMIN — DEXTROSE MONOHYDRATE SCH MLS/HR: 5 INJECTION, SOLUTION INTRAVENOUS at 23:53

## 2017-04-07 RX ADMIN — OXYCODONE HYDROCHLORIDE SCH MG: 20 TABLET, FILM COATED, EXTENDED RELEASE ORAL at 08:31

## 2017-04-07 RX ADMIN — DEXTROSE MONOHYDRATE SCH MLS/HR: 5 INJECTION, SOLUTION INTRAVENOUS at 00:31

## 2017-04-07 RX ADMIN — OXYCODONE HYDROCHLORIDE SCH MG: 20 TABLET, FILM COATED, EXTENDED RELEASE ORAL at 22:04

## 2017-04-07 RX ADMIN — BUDESONIDE SCH MG: 0.5 INHALANT ORAL at 20:19

## 2017-04-07 RX ADMIN — THERA TABS SCH EACH: TAB at 08:34

## 2017-04-07 RX ADMIN — IOHEXOL PRN ML: 350 INJECTION, SOLUTION INTRAVENOUS at 15:00

## 2017-04-07 RX ADMIN — IPRATROPIUM BROMIDE AND ALBUTEROL SULFATE PRN ML: .5; 3 SOLUTION RESPIRATORY (INHALATION) at 08:37

## 2017-04-07 RX ADMIN — METOPROLOL TARTRATE SCH MG: 25 TABLET, FILM COATED ORAL at 09:27

## 2017-04-07 RX ADMIN — IOHEXOL PRN ML: 350 INJECTION, SOLUTION INTRAVENOUS at 14:07

## 2017-04-07 RX ADMIN — METOPROLOL TARTRATE SCH MG: 25 TABLET, FILM COATED ORAL at 20:05

## 2017-04-07 RX ADMIN — POTASSIUM CHLORIDE SCH MEQ: 20 TABLET, EXTENDED RELEASE ORAL at 09:28

## 2017-04-07 RX ADMIN — DEXTROSE MONOHYDRATE SCH MLS/HR: 5 INJECTION, SOLUTION INTRAVENOUS at 09:27

## 2017-04-07 RX ADMIN — BUDESONIDE SCH MG: 0.5 INHALANT ORAL at 08:37

## 2017-04-07 RX ADMIN — Medication SCH MG: at 15:56

## 2017-04-07 RX ADMIN — OXYCODONE HYDROCHLORIDE SCH MG: 20 TABLET, FILM COATED, EXTENDED RELEASE ORAL at 15:55

## 2017-04-07 RX ADMIN — PANTOPRAZOLE SODIUM SCH MG: 40 INJECTION, POWDER, FOR SOLUTION INTRAVENOUS at 08:33

## 2017-04-07 NOTE — CT
EXAMINATION TYPE: CT ChestAbdPelvis w con

 

DATE OF EXAM: 4/7/2017 3:50 PM

 

COMPARISON: CT chest and abdomen June 30, 2016. 

 

HISTORY: Elevated liver function. Diffuse pain suspected.

 

CT DLP: 2049 mGycm. Automated Exposure Control for Dose Reduction was Utilized.

 

 

CONTRAST: 

CT scan of the thorax, abdomen and pelvis is performed with oral and with IV Contrast, patient inject
ed with 80 mL of Omnipaque 350.

 

FINDINGS:

 

LUNGS: There is background of mild to moderate emphysematous change. There is ill-defined consolidati
on with air bronchograms involving the left upper lobe and lingula. There are small bilateral pleural
 effusions. There is associated compressive atelectasis in both bases. No pneumothorax is seen bilate
rally.

 

MEDIASTINUM: There are more prominent but still subcentimeter prevascular lymph nodes near axial imag
e 16 identified.  There are borderline enlarged AP window lymph nodes near axial image 21 more promin
ent than prior study. There is prominent left hilar lymph nodes No pericardial effusion is seen.  Car
diomegaly is identified. There is three-vessel coronary artery calcification seen.

 

OTHER:  No additional significant abnormality is seen.

 

LIVER/GB: Gallbladder is surgically absent. There is mild central intrahepatic and extra hepatic bili
conrad dilatation measuring 12 mm on coronal image 36 actually slightly improved from prior exam.

 

PANCREAS: No significant abnormality is seen.

 

SPLEEN: No significant abnormality is seen.

 

ADRENALS: No significant abnormality is seen.

 

KIDNEYS: There is symmetric cortical medullary uptake and excretion from both kidneys without evidenc
e of hydronephrosis seen bilaterally. Prominent central vascular calcification is seen. There is nons
pecific mild to moderate ill-defined fluid surrounding both kidneys could be on basis of product of c
hronic medical renal disease as there is some cortical thinning noted bilaterally. There is simple ap
pearing 2.4 cm cyst anteriorly upper to mid pole level left kidney redemonstrated. Air focus nondepen
dently in bladder on axial image 110 is noted, correlation for recent catheterization advised otherwi
se other etiologies need to be excluded or considered.

 

BOWEL: Oral contrast does not reach colonic level making evaluation slightly suboptimal. No suspiciou
s small or large bowel dilatation is seen. Sigmoid colonic diverticulosis is redemonstrated.

 

GENITAL ORGANS: Uterus is surgically absent. Small to moderate free fluid in the right pelvis is note
d on axial image 103.

 

LYMPH NODES: No greater than 1cm abdominal or pelvic lymph nodes are appreciated.

 

OSSEOUS STRUCTURES: There is dextroconvex scoliosis in the lumbar spine. There is multilevel vacuum d
isc phenomenon and disc space narrowing throughout the lumbar spine. Postsurgical change with bilater
al laminectomy defects and spinous process resection at several levels in the mid lumbar spine are re
demonstrated.

 

OTHER: Mild diffuse subcutaneous edema or soft tissue anasarca in the abdomen and pelvis is present.

 

There is severe calcified atherosclerotic change of the aorta and pelvic branch vessels redemonstrate
d.

 

IMPRESSION: 

1. Mild central intrahepatic and extrahepatic biliary dilatation identified.  Degree of dilatation is
 improved from prior exam. No obstructing mass or calculus clearly seen.

2. Mild underlying emphysematous change with suspected pneumonic consolidation involving the left upp
er lobe and lingula, correlate for multi lobar pneumonia. Suspect reactive thoracic adenopathy.

## 2017-04-07 NOTE — P.PN
Subjective





This is a 77-year-old  female one of my patient with a previous 

medical history significant for CAD post-PCI of the LAD, hypertension and 

hypertensive cardio vascular disease with left ventricular hypertrophy, 

hyperlipidemia, history of the for arterial occlusive disease, carotid artery 

disease status post right carotid endarterectomy with about 70% stenosis of 

bilateral carotid arteries, has been under the care of cardiology, pulmonary 

medicine, vascular surgery, gastroenterology, she was recently hospitalized at 

[facility] in 03/16/2017 after she was admitted for acute 

respiratory failure secondary to acute diastolic heart failure and was seen and 

evaluated by pulmonary and cardiology along with gastroenterology at that time 

she was given 2 units of packed red blood cells and the patient was sent home 

as a matter fact I saw the patient Rocephin the office about a week ago and she 

was feeling a bit better with increased swelling in both lower extremities she 

was placed on Lasix along with potassium supplement, she had laboratory 

evaluation that showed significant anemia patient was scheduled to go for blood 

transfusion yesterday patient was out of it completely and she did not call for 

the transfer, her daughter was with her the bedside and she stated that nobody 

called her to tell her that she needed to go to have a blood transfusion, 

patient was quite out of it yesterday and she has not been eating much and she 

has not been taking her medicine over the last 24 hours today patient felt 

extremely weak and she was spitting up blood over the last 48 hours she ended 

up coming to the ER and she was seen in the ER and her hemoglobin was 6.8, and 

patient was found to have a left upper lobe of 100 along with hemoptysis she 

was admitted to the intensive care unit because she was hypotensive for acute 

respiratory failure secondary to left upper lobe pneumonia/hemoptysis with the 

acute exacerbation of COPD.





4/5: Patient is followed by Dr. Mendez.  She is maintained in the intensive 

care unit.  She does complain of coughing up blood today.  She is status post 2 

units packed RBC with repeat hemoglobin of 8.7.  BUN 56 and creatinine 1.4.  

She is continued on IV antibiotics with Zosyn and Levaquin.  Solu-Medrol 60 mg 

every 12 hours. Patient has been seen by gastroenterology with plan for clear 

liquid diet, consider EGD if active bleeding, MRCP if liver enzymes do not 

improve.  Abdominal ultrasound reveals right renal cyst.  No evidence of solid 

renal mass or obstruction.  No dilated ducts.  Repeat chest x-ray shows 

cardiomegaly with improved aeration of both lungs





4/6: Patient states that she is feeling better today cough is much better she 

is coughing very little at this point.  Dr. Mendez does not plan for 

bronchoscopy at this point.  Solu-Medrol to oral prednisone.  She denies any 

dizziness or lightheadedness.  She has not had a bowel movement.  Hemoglobin is 

at 8.4, BUN 53 and creatinine 1.23.  Patient does not want a nicotine patch.  

She is anxious to be discharged.  Plan to continue to monitor her overnight and 

possible discharge in the next 24-48 hours.





4/7: Patient is breathing is better today.  She did cough up some dark red 

blood and is agreeable to undergo bronchoscopy.  Hemoglobin 8.2.  CAT scan of 

the chest ordered for possible malignancy, alpha-fetoprotein, acetaminophen 

level, hepatitis panel by GI services.  Alkaline phosphatase has increased to 

2246. Dr. Mendez discontinue Levaquin and continue Zosyn.  She is on a 

prednisone taper.  Potassium replaced.





Objective





- Vital Signs


Vital signs: 


 Vital Signs











Temp  97.3 F L  04/07/17 08:00


 


Pulse  80   04/07/17 08:57


 


Resp  18   04/07/17 08:00


 


BP  150/68   04/07/17 08:00


 


Pulse Ox  91 L  04/07/17 08:00








 Intake & Output











 04/06/17 04/07/17 04/07/17





 18:59 06:59 18:59


 


Intake Total 1650  480


 


Output Total 800 550 


 


Balance 850 -550 480


 


Weight  72.8 kg 


 


Intake:   


 


  Intake, IV Titration 750  





  Amount   


 


    Levofloxacin 250Mg-D5w 50  





    Pmx 250 mg In Dextrose/   





    Water 1 50ml.bag @ 50 mls   





    /hr IVPB Q24H [facility] Rx#:   





    [id number]   


 


    Piperacillin-Tazobactam 3 100  





    .375 gm In Dextrose/Water   





    1 50ml.bag @ 12.5 mls/hr   





    IVPB Q8HR [facility]BIN Rx#:   





    [id number]   


 


    Sodium Chloride 0.9% 1, 600  





    000 ml @ 100 mls/hr IV .   





    Q10H CORBIN Rx#:[id number]   


 


  Oral 900  480


 


Output:   


 


  Urine 800 550 


 


Other:   


 


  Voiding Method Toilet Toilet Toilet


 


  # Voids 2 2 














- Exam





General appearance: average body habitus, mild distress





- EENT


Eyes: anicteric sclerae, EOMI, PERRLA, no ptosis, no scleral icterus, normal 

appearance


ENT: hard of hearing, NA/AT, normal oropharynx, no thrush


Ears: bilateral: normal





- Neck


Neck: no lymphadenopathy, normal ROM, no rigidity, no stridor, no thyromegaly


Carotids: bilateral: upstroke delayed, bruit present


Thyroid: negative: normal size





- Respiratory


Respiratory: left: rhonchi, wheezing, prolonged expiration, bilateral: 

diminished





- Cardiovascular


Rhythm: regular


Heart sounds: normal: S1, S2


Abnormal Heart Sounds: systolic murmur, no S3 Gallop, no S4 Gallop





- Gastrointestinal


General gastrointestinal: distended, normal bowel sounds, soft, no splenomegaly

, no tenderness, no umbilical hernia, no ventral hernia





- Integumentary


Integumentary: decreased turgor, pale





- Neurologic


Neurologic: CNII-XII intact





- Musculoskeletal


Musculoskeletal: generalized weakness, strength equal bilaterally





- Psychiatric


Psychiatric: A&O x's 3, appropriate affect, intact judgment & insight








- Labs


CBC & Chem 7: 


 04/07/17 06:39





 04/07/17 06:39


Labs: 


 Abnormal Lab Results - Last 24 Hours (Table)











  04/07/17 04/07/17 Range/Units





  06:39 06:39 


 


RBC  3.11 L   (3.80-5.40)  m/uL


 


Hgb  8.2 L   (11.4-16.0)  gm/dL


 


Hct  26.3 L   (34.0-46.0)  %


 


RDW  18.8 H   (11.5-15.5)  %


 


Plt Count  72 L   (150-450)  k/uL


 


Lymphocytes # (Manual)  0.1 L   (1.0-4.8)  k/uL


 


Nucleated RBCs  2 H   (0-0)  /100 WBC


 


Potassium   3.0 L*  (3.5-5.1)  mmol/L


 


BUN   46 H  (7-17)  mg/dL


 


Creatinine   1.16 H  (0.52-1.04)  mg/dL


 


Glucose   128 H  (74-99)  mg/dL


 


Calcium   8.3 L  (8.4-10.2)  mg/dL


 


AST   847 H  (14-36)  U/L


 


ALT   2246 H  (9-52)  U/L


 


Total Protein   4.9 L  (6.3-8.2)  g/dL


 


Albumin   2.6 L  (3.5-5.0)  g/dL








 Microbiology - Last 24 Hours (Table)











 04/05/17 04:15 Urine Culture - Final





 Urine,Catheterized 














Assessment and Plan


Plan: 





1.  Acute hypoxic respiratory failure secondary to hemoptysis with left upper 

lobe pneumonia with sepsis and acute exacerbation of COPD.  continue Zosyn 2.25 

g IV piggyback every 6 hours, Mucinex 600 mg orally twice every day, prednisone

, DuoNeb 3 mL nebulization 4 times every day, oxygen support, Pulmicort 0.5 mg 

nebulization twice every day.  pulmonary consultation from .





2.  Hemoptysis of unclear etiology could be related to left upper lobe 

pneumonia.  IV anabiotic, IV steroid, nebulized treatment, oxygen support, 

patient did go for bronchoscopy for possible malignancy.





3.  Acute blood loss anemia secondary to possible GI bleed as well as 

hemoptysis.  Status post transfusion of 2 units of packed red blood cells.





4. CAD post-PCI of the LAD.  Continue metoprolol 25 mg orally twice every day, 

Lipitor 40 mg orally once every day, losartan 50 mg orally once every day, 

patient was taken off her aspirin and Eliquis for now because of GI bleed.





5.  History of CVA of the left frontal lobe.  DC aspirin and Eliquis for now.





6.  Carotid artery disease as well as PAD.  Currently under the care of 

vascular surgery continue Lipitor 40 mg orally once every day for second or 

prevention patient cannot go on anticoagulation until after GI workup and 

hemoptysis workup.





7.  Left upper lobe pneumonia.  IV anabiotic Levaquin and Zosyn, IV steroids, 

nebulized treatment, oxygen support, may need bronchoscopy.





8.  Hypertension and hypertensive cardiovascular disease.  Continue losartan 50 

mg orally once every day, metoprolol 25 mg orally twice every day, hydralazine 

75 mg orally 2 times every day.





9.  Hyperlipidemia.  Continue Lipitor 40 mg orally once every day.





10.  ALLERGIC rhinitis.  Continue singular 10 mg at bedtime.





11.  Lumbar degenerative disc disease with spondylolisthesis and facet 

arthropathy.  Post epidural injection, patient is to be maintained on oxycodone 

20 mg orally 3 times every day.





12. PAF.will continue metoprolol 25 mg orally twice every day, hold Eliquis.





13.  DVT prophylaxis.  Patient will be taken off Eliquis for now she will have 

knee-high KEVIN hose as well as SCD's.





14.  GI prophylaxis.  Continue Protonix 40 mg IV push every 24 hours.





15.  Patient is full code.





16.  Elevated liver function tests due to acute hepatic cellular injury.  Alpha-

fetoprotein, acute hepatitis panel and abdominal CAT scan ordered by GI.  MRCP.





17.  Prognosis is guarded.





Discharge plan: Return home





Impression and plan of care have been directed as dictated by the signing 

physician.  Danielle Herbert nurse practitioner acting as scribe for signing 

physician.


Time with Patient: Greater than 30

## 2017-04-07 NOTE — P.PN
Subjective


Principal diagnosis: 


Anemia GI bleed





71-year-old female with a history of obscure GI bleed iron deficiency anemia 

with recent endoscopic evaluations including small bowel capsule without 

definitive source presents with symptomatic anemia, elevated liver enzymes, and 

melena.  No episodes of melena since admission.  Additionally suspect possible 

alveolar hemorrhage secondary to antiplatelet medications prior to admission.  

Reports blood-tinged phlegm this morning.  Denies abdominal pain.  Feels well.  

Hemoglobin 8.2.  Tolerating regular diet.  Antiplatelet medications on hold.  

Total bilirubin alkaline phosphatase within normal limits except transaminases 

are unchanged from 2 days ago; , ALT 2246. Patient denies history of 

ETOH abuse or recent consumption.








Objective





- Vital Signs


Vital signs: 


 Vital Signs











Temp  97.3 F L  04/07/17 08:00


 


Pulse  80   04/07/17 08:57


 


Resp  18   04/07/17 08:00


 


BP  150/68   04/07/17 08:00


 


Pulse Ox  91 L  04/07/17 08:00








 Intake & Output











 04/06/17 04/07/17 04/07/17





 18:59 06:59 18:59


 


Intake Total 1650  480


 


Output Total 800 550 


 


Balance 850 -550 480


 


Weight  72.8 kg 


 


Intake:   


 


  Intake, IV Titration 750  





  Amount   


 


    Levofloxacin 250Mg-D5w 50  





    Pmx 250 mg In Dextrose/   





    Water 1 50ml.bag @ 50 mls   





    /hr IVPB Q24H [facility] Rx#:   





    [id number]   


 


    Piperacillin-Tazobactam 3 100  





    .375 gm In Dextrose/Water   





    1 50ml.bag @ 12.5 mls/hr   





    IVPB Q8HR [facility]BIN Rx#:   





    [id number]   


 


    Sodium Chloride 0.9% 1, 600  





    000 ml @ 100 mls/hr IV .   





    Q10H CORBIN Rx#:[id number]   


 


  Oral 900  480


 


Output:   


 


  Urine 800 550 


 


Other:   


 


  Voiding Method Toilet Toilet Toilet


 


  # Voids 2 2 














- Exam


General appearance: The patient is alert, oriented, in no acute distress.


HET: Head is normocephalic and atraumatic.  Pupils are equal and reactive.  

Oropharynx is clear without lesions.


Neck: Supple without lymphadenopathy.  Trachea midline.


Heart: S1 S2.  


Lungs: No crackles or wheezes are heard.


Abdomen: Soft, nontender, nondistended with  bowel sounds.  No peritoneal 

signs.  No palpable organomegaly or masses.


Extremities: Madsen erythema. Normal skin color and turgor.  No cyanosis, rash, 

ulceration, clubbing, or edema.  Radial and pedal pulses are 2/4 bilaterally.


Neurological: No focal deficits.  Strength and sensation are grossly intact.








- Labs


CBC & Chem 7: 


 04/07/17 06:39





 04/07/17 06:39


Labs: 


 Abnormal Lab Results - Last 24 Hours (Table)











  04/07/17 04/07/17 Range/Units





  06:39 06:39 


 


RBC  3.11 L   (3.80-5.40)  m/uL


 


Hgb  8.2 L   (11.4-16.0)  gm/dL


 


Hct  26.3 L   (34.0-46.0)  %


 


RDW  18.8 H   (11.5-15.5)  %


 


Plt Count  72 L   (150-450)  k/uL


 


Lymphocytes # (Manual)  0.1 L   (1.0-4.8)  k/uL


 


Nucleated RBCs  2 H   (0-0)  /100 WBC


 


Potassium   3.0 L*  (3.5-5.1)  mmol/L


 


BUN   46 H  (7-17)  mg/dL


 


Creatinine   1.16 H  (0.52-1.04)  mg/dL


 


Glucose   128 H  (74-99)  mg/dL


 


Calcium   8.3 L  (8.4-10.2)  mg/dL


 


AST   847 H  (14-36)  U/L


 


ALT   2246 H  (9-52)  U/L


 


Total Protein   4.9 L  (6.3-8.2)  g/dL


 


Albumin   2.6 L  (3.5-5.0)  g/dL








 Microbiology - Last 24 Hours (Table)











 04/05/17 04:15 Urine Culture - Final





 Urine,Catheterized 














Assessment and Plan


(1) Acute GI bleeding


Narrative/Plan: 


Suspect chronic GI blood loss exacerbated by antiplatelet medications possible 

obscure bleeding from small bowel angiectasia AVM.


Status: Acute   





(2) Elevated liver enzymes


Narrative/Plan: 


Acute hepatocellular injury possible toxic medications etc, viral, ischemic 

hepatitis with mild hypotension on admission.  Extrahepatic process cannot be 

entirely excluded.  History of mild to moderate extrahepatic and central 

intrahepatic biliary dilatation per CT imaging June 2016.  No obstructing 

calculus or mass clearly seen at that time.





Questionable history of ETOH abuse ; evidence of madsen erythema but denies 

ETOH history.


Status: Acute   





(3) Acute blood loss anemia


Status: Acute   





(4) Hemoptysis


Narrative/Plan: 


suspected alveolar haemorrhage


Status: Acute   





(5) Elevated troponin


Status: Acute   





(6) Iron deficiency anemia


Status: Chronic   





(7) Coagulopathy


Narrative/Plan: 


Admission INR 1.7.


Status: Acute   


Plan: 


1.  Monitor CBC.  Hepatitis panel. AFP. Acetaminophen level. Case discussed 

with Dr. Mendez.


2.  Continue GI prophylaxis.


3.  MRCP; evaluation of transaminitis.  Repeat liver enzymes/PT/INR in a.m.  We'

ll continue to follow.





Assessment and plan a care discussed with Dr. Mejia.

## 2017-04-07 NOTE — P.PN
Subjective


Principal diagnosis: 





GI bleed





Patient seen and examined.  Patient states she is feeling better today.  She 

did cough up a small amount of dark red blood.  The patient states it looked 

like a blood clot.  She denies any abdominal pain, nausea, vomiting, diarrhea.  

She states her breathing is good today.





Objective





- Vital Signs


Vital signs: 


 Vital Signs











Temp  97.0 F L  04/07/17 11:04


 


Pulse  82   04/07/17 12:02


 


Resp  18   04/07/17 11:05


 


BP  144/69   04/07/17 11:04


 


Pulse Ox  92 L  04/07/17 11:04








 Intake & Output











 04/06/17 04/07/17 04/07/17





 18:59 06:59 18:59


 


Intake Total 1650  630


 


Output Total 800 550 350


 


Balance 850 -550 280


 


Weight  72.8 kg 


 


Intake:   


 


  Intake, IV Titration 750  150





  Amount   


 


    Levofloxacin 250Mg-D5w 50  50





    Pmx 250 mg In Dextrose/   





    Water 1 50ml.bag @ 50 mls   





    /hr IVPB Q24H [facility] Rx#:   





    [id number]   


 


    Piperacillin-Tazobactam 3 100  100





    .375 gm In Dextrose/Water   





    1 50ml.bag @ 12.5 mls/hr   





    IVPB Q8HR [facility] Rx#:   





    [id number]   


 


    Sodium Chloride 0.9% 1, 600  





    000 ml @ 100 mls/hr IV .   





    Q10H [facility]BIN Rx#:[id number]   


 


  Oral 900  480


 


Output:   


 


  Urine 800 550 350


 


Other:   


 


  Voiding Method Toilet Toilet Toilet


 


  # Voids 2 2 2














- Exam





Gen.: Patient is alert and oriented 3, she does appear to be acutely ill


Cardiovascular: Regular rate and rhythm, S1/S2


Lungs: Diminished breath sounds bilaterally, improved aeration


Abdomen: Soft nontender nondistended positive bowel sounds


Extremities: 1-2+ pitting edema





- Labs


CBC & Chem 7: 


 04/07/17 06:39





 04/07/17 06:39


Labs: 


 Abnormal Lab Results - Last 24 Hours (Table)











  04/07/17 04/07/17 Range/Units





  06:39 06:39 


 


RBC  3.11 L   (3.80-5.40)  m/uL


 


Hgb  8.2 L   (11.4-16.0)  gm/dL


 


Hct  26.3 L   (34.0-46.0)  %


 


RDW  18.8 H   (11.5-15.5)  %


 


Plt Count  72 L   (150-450)  k/uL


 


Lymphocytes # (Manual)  0.1 L   (1.0-4.8)  k/uL


 


Nucleated RBCs  2 H   (0-0)  /100 WBC


 


Potassium   3.0 L*  (3.5-5.1)  mmol/L


 


BUN   46 H  (7-17)  mg/dL


 


Creatinine   1.16 H  (0.52-1.04)  mg/dL


 


Glucose   128 H  (74-99)  mg/dL


 


Calcium   8.3 L  (8.4-10.2)  mg/dL


 


AST   847 H  (14-36)  U/L


 


ALT   2246 H  (9-52)  U/L


 


Total Protein   4.9 L  (6.3-8.2)  g/dL


 


Albumin   2.6 L  (3.5-5.0)  g/dL








 Microbiology - Last 24 Hours (Table)











 04/05/17 04:15 Urine Culture - Final





 Urine,Catheterized 














Assessment and Plan


Plan: 





Acute hypoxic respiratory failure, resolved


Left lung infiltrate: Pneumonia versus pulmonary hemorrhage


Acute blood loss anemia


Acute GI bleed


Acute exacerbation of COPD


Hemoptysis


Acute kidney injury


Coagulopathy secondary to novel anticoagulant


Sepsis with SIRS present on admission


Recent E. coli UTI


Elevated transaminases of unclear etiology


History of congestive heart failure, diastolic, ejection fraction 45-50%


Severe pulmonary hypertension with an RVSP of 58 mmHg


Chronic kidney disease stage III


History of coronary artery disease


Valvular heart disease


History of CVA


Dyslipidemia


History of hypertension





O2 to maintain saturation greater than or equal to 88%


Monitor hemoglobin, transfuse for hemoglobin less than 7


Hold all anticoagulation


IV fluid hydration


Antibiotics: Discontinue Levaquin, continue Zosyn


Bronchodilators


Pulmicort


Prednisone taper


Blood, urine, sputum cultures


ANCA, AntiGBM, Complement, AntiDS DNA - negative


No nephrotoxins


Avoid hypotension


GI prophylaxis with Protonix


DVT prophylaxis with SCDs


Gastroenterology consult - plan for MRCP


Nephrology recs


We will check CT of the chest today to assess for possible malignancy

## 2017-04-08 VITALS — HEART RATE: 89 BPM | TEMPERATURE: 97.2 F

## 2017-04-08 VITALS — SYSTOLIC BLOOD PRESSURE: 128 MMHG | DIASTOLIC BLOOD PRESSURE: 78 MMHG

## 2017-04-08 VITALS — RESPIRATION RATE: 18 BRPM

## 2017-04-08 LAB
ALP SERPL-CCNC: 123 U/L (ref 38–126)
ALT SERPL-CCNC: 1921 U/L (ref 9–52)
ANION GAP SERPL CALC-SCNC: 11 MMOL/L
AST SERPL-CCNC: 357 U/L (ref 14–36)
BUN SERPL-SCNC: 39 MG/DL (ref 7–17)
CALCIUM SPEC-MCNC: 8.8 MG/DL (ref 8.4–10.2)
CELLS COUNTED: 200
CH: 25.2
CHCM: 29.5
CHLORIDE SERPL-SCNC: 98 MMOL/L (ref 98–107)
CO2 SERPL-SCNC: 32 MMOL/L (ref 22–30)
ERYTHROCYTE [DISTWIDTH] IN BLOOD BY AUTOMATED COUNT: 3.51 M/UL (ref 3.8–5.4)
ERYTHROCYTE [DISTWIDTH] IN BLOOD: 18.4 % (ref 11.5–15.5)
GLUCOSE SERPL-MCNC: 108 MG/DL (ref 74–99)
HCT VFR BLD AUTO: 29.9 % (ref 34–46)
HDW: 4.24
HGB BLD-MCNC: 9.1 GM/DL (ref 11.4–16)
INR PPP: 1.3 (ref ?–1.1)
MAGNESIUM SPEC-SCNC: 1.7 MG/DL (ref 1.6–2.3)
MCH RBC QN AUTO: 26 PG (ref 25–35)
MCHC RBC AUTO-ENTMCNC: 30.6 G/DL (ref 31–37)
MCV RBC AUTO: 85.1 FL (ref 80–100)
NON-AFRICAN AMERICAN GFR(MDRD): 45
PHOSPHATE SERPL-MCNC: 3.4 MG/DL (ref 2.5–4.5)
POTASSIUM SERPL-SCNC: 3.7 MMOL/L (ref 3.5–5.1)
PROT SERPL-MCNC: 5.8 G/DL (ref 6.3–8.2)
PT BLD: 12.5 SEC (ref 9–12)
SODIUM SERPL-SCNC: 141 MMOL/L (ref 137–145)
WBC # BLD AUTO: 10 K/UL (ref 3.8–10.6)
WBC (PEROX): 10.53

## 2017-04-08 RX ADMIN — IPRATROPIUM BROMIDE AND ALBUTEROL SULFATE PRN ML: .5; 3 SOLUTION RESPIRATORY (INHALATION) at 08:41

## 2017-04-08 RX ADMIN — METOPROLOL TARTRATE SCH MG: 25 TABLET, FILM COATED ORAL at 08:20

## 2017-04-08 RX ADMIN — OXYCODONE HYDROCHLORIDE SCH MG: 20 TABLET, FILM COATED, EXTENDED RELEASE ORAL at 15:45

## 2017-04-08 RX ADMIN — POTASSIUM CHLORIDE SCH MEQ: 20 TABLET, EXTENDED RELEASE ORAL at 08:21

## 2017-04-08 RX ADMIN — PANTOPRAZOLE SODIUM SCH MG: 40 INJECTION, POWDER, FOR SOLUTION INTRAVENOUS at 08:20

## 2017-04-08 RX ADMIN — OXYCODONE HYDROCHLORIDE SCH MG: 20 TABLET, FILM COATED, EXTENDED RELEASE ORAL at 08:17

## 2017-04-08 RX ADMIN — BUDESONIDE SCH: 0.5 INHALANT ORAL at 19:58

## 2017-04-08 RX ADMIN — Medication SCH MG: at 12:12

## 2017-04-08 RX ADMIN — DEXTROSE MONOHYDRATE SCH MLS/HR: 5 INJECTION, SOLUTION INTRAVENOUS at 08:17

## 2017-04-08 RX ADMIN — FUROSEMIDE SCH MG: 40 TABLET ORAL at 08:22

## 2017-04-08 RX ADMIN — BUDESONIDE SCH MG: 0.5 INHALANT ORAL at 08:41

## 2017-04-08 RX ADMIN — DEXTROSE MONOHYDRATE SCH MLS/HR: 5 INJECTION, SOLUTION INTRAVENOUS at 15:45

## 2017-04-08 RX ADMIN — THERA TABS SCH EACH: TAB at 08:22

## 2017-04-08 NOTE — PN
DATE OF SERVICE: 04/08/2017. 



She has been hemodynamically stable. She is not complaining of 

shortness of breath. She is more awake. Her blood pressure is 131/86, 

respiratory rate 18, pulse of 78, temperature 97.2, O2 sat on room air 

is 93%. HEENT is unremarkable. Chest reveals decreased breath sounds 

at the base. Cardiovascular system reveals an S1 and S2.  ABDOMEN: 

Soft. There is trace to 1+ pedal edema.  



Sodium is 141, potassium 3.7, chloride 98, bicarb 32, BUN 39, 

creatinine of 1.16. White count 10, hemoglobin 9.1. CT scan of the 

chest, abdomen and pelvis shows evidence of mild to moderate 

emphysematous changes with ill-defined consolidation with air 

bronchograms in the left upper lobe and lingula with small bilateral 

pleural effusions and compressive atelectasis and there are hilar 

lymph nodes on the left.  



IMPRESSION:

1. Acute hypoxic respiratory failure with left lung infiltrate, which 

may be due to pneumonia versus pulmonary hemorrhage.  

2. Hemoptysis and acute blood loss anemia due to gastrointestinal 

bleed.  

3. Recent E. coli urinary tract infection. 

4. Pulmonary hypertension. 

5. Chronic kidney disease, stage III.



Continue supplemental oxygen.  Transfuse packed cells as needed. 

Continue Zosyn, prednisone taper, Pulmicort. Will most likely need 

further work-up once she is more stable and possible repeat CT once 

treatment is done for her pneumonia. Depending on how she does, we 

shall make further changes to her care.

## 2017-04-08 NOTE — P.PN
Subjective





This is a 71-year-old female followed for acute kidney injury from pneumonia.  

Because of hemoptysis there is some concern for vasculitis.





Overall patient is feeling better less short of breath less cough but continues 

to have hemoptysis.  Her creatinine is down to normal at 1.16 yesterday and 

1.19 this morning.





She denies any epistaxis immature area joint pains or rash.





Or chills.  No dizziness no nausea vomiting abdominal pain or diarrhea.





.





Objective





- Vital Signs


Vital signs: 


 Vital Signs











Temp  97.2 F L  04/08/17 08:00


 


Pulse  86   04/08/17 08:58


 


Resp  18   04/08/17 08:00


 


BP  136/76   04/08/17 08:00


 


Pulse Ox  93 L  04/08/17 08:45








 Intake & Output











 04/07/17 04/08/17 04/08/17





 18:59 06:59 18:59


 


Intake Total 1300 50 360


 


Output Total 850 900 


 


Balance 450 -850 360


 


Weight 72.8 kg 72.4 kg 


 


Intake:   


 


  Intake, IV Titration 100 50 





  Amount   


 


    Piperacillin-Tazobactam 3 100 50 





    .375 gm In Dextrose/Water   





    1 50ml.bag @ 12.5 mls/hr   





    IVPB Q8HR [facility] Rx#:   





    [id number]   


 


  Oral 1200  360


 


Output:   


 


  Urine 850 900 


 


Other:   


 


  Voiding Method Toilet Toilet 


 


  # Voids 3 1 








On examination she is awake alert oriented comfortable








HEENT exam slightly elevated JVP around 7-8 cm, neck is supple no facial 

asymmetry





Lungs are significant for an occasional fine crackle mostly at left than on the 

right.  Normal to percussion fair air entry bilaterally





Heart sounds are unremarkable for any murmur rub gallop





Abdomen soft nontender





Extremity exam significant for mild or minimal edema.





Neurologically awake alert oriented but





- Labs


CBC & Chem 7: 


 04/08/17 06:17





 04/08/17 06:17


Labs: 


 Abnormal Lab Results - Last 24 Hours (Table)











  04/08/17 04/08/17 04/08/17 Range/Units





  06:17 06:17 06:17 


 


RBC  3.51 L    (3.80-5.40)  m/uL


 


Hgb  9.1 L    (11.4-16.0)  gm/dL


 


Hct  29.9 L    (34.0-46.0)  %


 


MCHC  30.6 L    (31.0-37.0)  g/dL


 


RDW  18.4 H    (11.5-15.5)  %


 


Plt Count  108 L    (150-450)  k/uL


 


Neutrophils # (Manual)  8.7 H    (1.3-7.7)  k/uL


 


Lymphocytes # (Manual)  0.7 L    (1.0-4.8)  k/uL


 


PT    12.5 H  (9.0-12.0)  sec


 


Carbon Dioxide   32 H   (22-30)  mmol/L


 


BUN   39 H   (7-17)  mg/dL


 


Creatinine   1.19 H   (0.52-1.04)  mg/dL


 


Glucose   108 H   (74-99)  mg/dL


 


AST   357 H   (14-36)  U/L


 


ALT   1921 H   (9-52)  U/L


 


Total Protein   5.8 L   (6.3-8.2)  g/dL


 


Albumin   3.1 L   (3.5-5.0)  g/dL














Assessment and Plan


Plan: 





Impression.





1.  Acute kidney injury from prerenal from pneumonia resolving creatinine down 

to 1.19 with good urine output.  Trace proteinuria.





2.  Hemoptysis with pneumonia and left upper lobe involvement.  Improving chest 

x-ray.  Unlikely to be pulmonary renal syndromes.  Patient has trace 

proteinuria improving creatinine improving chest x-ray.





Recommendation.





We'll watch it for right now no further nephrological intervention

## 2017-04-09 NOTE — P.DS
Providers


Date of admission: 


04/04/17 15:35





Expected date of discharge: 04/08/17


Attending physician: 


Kinjal Hood





Consults: 





 





04/04/17 17:24


Consult Physician Routine 


   Consulting Provider: Cy Romero


   Consult Reason/Comments: BLACK CKD 3


   Do you want consulting provider notified?: Yes











Primary care physician: 


Kinjal Hood





[facility] Course: 





This is a 77-year-old  female one of my patient with a previous 

medical history significant for CAD post-PCI of the LAD, hypertension and 

hypertensive cardio vascular disease with left ventricular hypertrophy, 

hyperlipidemia, history of the for arterial occlusive disease, carotid artery 

disease status post right carotid endarterectomy with about 70% stenosis of 

bilateral carotid arteries, has been under the care of cardiology, pulmonary 

medicine, vascular surgery, gastroenterology, she was recently hospitalized at 

[facility] in 03/16/2017 after she was admitted for acute 

respiratory failure secondary to acute diastolic heart failure and was seen and 

evaluated by pulmonary and cardiology along with gastroenterology at that time 

she was given 2 units of packed red blood cells and the patient was sent home 

as a matter fact I saw the patient Rocephin the office about a week ago and she 

was feeling a bit better with increased swelling in both lower extremities she 

was placed on Lasix along with potassium supplement, she had laboratory 

evaluation that showed significant anemia patient was scheduled to go for blood 

transfusion yesterday patient was out of it completely and she did not call for 

the transfer, her daughter was with her the bedside and she stated that nobody 

called her to tell her that she needed to go to have a blood transfusion, 

patient was quite out of it yesterday and she has not been eating much and she 

has not been taking her medicine over the last 24 hours today patient felt 

extremely weak and she was spitting up blood over the last 48 hours she ended 

up coming to the ER and she was seen in the ER and her hemoglobin was 6.8, and 

patient was found to have a left upper lobe of 100 along with hemoptysis she 

was admitted to the intensive care unit because she was hypotensive for acute 

respiratory failure secondary to left upper lobe pneumonia/hemoptysis with the 

acute exacerbation of COPD.





4/5: Patient is followed by Dr. Mendez.  She is maintained in the intensive 

care unit.  She does complain of coughing up blood today.  She is status post 2 

units packed RBC with repeat hemoglobin of 8.7.  BUN 56 and creatinine 1.4.  

She is continued on IV antibiotics with Zosyn and Levaquin.  Solu-Medrol 60 mg 

every 12 hours. Patient has been seen by gastroenterology with plan for clear 

liquid diet, consider EGD if active bleeding, MRCP if liver enzymes do not 

improve.  Abdominal ultrasound reveals right renal cyst.  No evidence of solid 

renal mass or obstruction.  No dilated ducts.  Repeat chest x-ray shows 

cardiomegaly with improved aeration of both lungs





4/6: Patient states that she is feeling better today cough is much better she 

is coughing very little at this point.  Dr. Mendez does not plan for 

bronchoscopy at this point.  Solu-Medrol to oral prednisone.  She denies any 

dizziness or lightheadedness.  She has not had a bowel movement.  Hemoglobin is 

at 8.4, BUN 53 and creatinine 1.23.  Patient does not want a nicotine patch.  

She is anxious to be discharged.  Plan to continue to monitor her overnight and 

possible discharge in the next 24-48 hours.





4/7: Patient is breathing is better today.  She did cough up some dark red 

blood and is agreeable to undergo bronchoscopy.  Hemoglobin 8.2.  CAT scan of 

the chest ordered for possible malignancy, alpha-fetoprotein, acetaminophen 

level, hepatitis panel by GI services.  Alkaline phosphatase has increased to 

2246. Dr. Mendez discontinue Levaquin and continue Zosyn.  She is on a 

prednisone taper.  Potassium replaced.





4/8: Patient has been seen by nephrology with no plan for any further 

intervention.  Acute kidney injury has resolved with creatinine down to 1.19 

with good urine output.  There is trace proteinuria.  Liver function tests have 

improved from yesterday with AST of 357 and ALT 1921.  She has been cleared for 

discharge from gastroenterology.  She will be discharged home today in stable 

condition.





Discharge Diagnoses:


1.  Acute hypoxic respiratory failure secondary to hemoptysis with left upper 

lobe pneumonia with sepsis and acute exacerbation of COPD


2.  Hemoptysis of unclear etiology could be related to left upper lobe pneumonia


3.  Acute blood loss anemia secondary to possible GI bleed as well as hemoptysis


4. CAD post-PCI of the LAD


5.  History of CVA of the left frontal lobe


6.  Carotid artery disease as well as PAD.  


7.  Left upper lobe pneumonia. 


8.  Hypertension and hypertensive cardiovascular disease.  


9.  Hyperlipidemia.  


10.  ALLERGIC rhinitis.  


11.  Lumbar degenerative disc disease with spondylolisthesis and facet 

arthropathy.  Post epidural injection


12. PAF


13. Elevated liver function tests due to acute hepatic cellular injury.  





Discharge plan: Return home





Impression and plan of care have been directed as dictated by the signing 

physician.  Danielle Herbert nurse practitioner acting as scribe for signing 

physician.





Patient Condition at Discharge: Good





Plan - Discharge Summary


New Discharge Prescriptions: 


predniSONE 40 mg PO DAILY #10 tab


Discharge Medication List





Montelukast Sodium [Singulair] 10 mg PO HS 09/20/14 [History]


ALPRAZolam [Xanax] 0.5 mg PO HS 12/05/15 [History]


Apixaban [Eliquis] 2.5 mg PO BID 12/05/15 [History]


Metoprolol Tartrate [Lopressor] 25 mg PO BID 12/05/15 [History]


Nitroglycerin Sl Tabs [Nitrostat] 0.4 mg SUBLINGUAL Q5M PRN 12/05/15 [History]


Atorvastatin [Lipitor] 40 mg PO HS 12/06/15 [History]


Ergocalciferol [Vitamin D2 (DRISDOL)] 50,000 unit PO Q7D 03/15/17 [History]


Ipratropium-Albuterol Nebulize [Duoneb 0.5 mg-3 mg/3 ml Soln] 3 ml INHALATION RT

-QID PRN 03/15/17 [History]


Melatonin 3 mg PO HS 03/15/17 [History]


Multivitamins, Thera [Multivitamin (formulary)] 1 tab PO DAILY 03/15/17 [History

]


Omeprazole 40 mg PO DAILY 03/15/17 [History]


amLODIPine [Norvasc] 10 mg PO DAILY 03/15/17 [History]


hydrALAZINE HCL [Apresoline] 75 mg PO BID 03/15/17 [History]


oxyCODONE HCL 20 mg PO TID 03/15/17 [History]


Sucralfate [Carafate] 1 gm PO BID #600 ml 03/20/17 [Rx]


Aspirin 81 mg PO DAILY 04/04/17 [History]


Furosemide [Lasix] 40 mg PO DAILY 04/04/17 [History]


Losartan Potassium [Cozaar] 100 mg PO DAILY 04/04/17 [History]


Potassium Chloride ER [K-Dur 20] 20 meq PO DAILY 04/04/17 [History]


Aspirin 81 mg PO DAILY #0  04/08/17 [Rx]


Ferrous Sulfate [Iron (65 MG Elemental)] 325 mg PO W/LUNCH  tab 04/08/17 [Rx]


predniSONE 40 mg PO DAILY #10 tab 04/08/17 [Rx]








Follow up Appointment(s)/Referral(s): 


Kinjal Hood MD [Primary Care Provider] - 1-2 days


Andrew Bolton MD [STAFF PHYSICIAN] - 1 Week


Patient Instructions/Handouts:  Viral Pneumonia (DC), Hemoptysis (GEN)


Discharge Disposition: HOME SELF-CARE

## 2017-04-25 ENCOUNTER — HOSPITAL ENCOUNTER (INPATIENT)
Dept: HOSPITAL 47 - EC | Age: 72
LOS: 6 days | Discharge: NURSING, REHAB OR LONG-TERM CARE | DRG: 871 | End: 2017-05-01
Payer: MEDICARE

## 2017-04-25 VITALS — BODY MASS INDEX: 23.4 KG/M2

## 2017-04-25 DIAGNOSIS — K21.9: ICD-10-CM

## 2017-04-25 DIAGNOSIS — D50.0: ICD-10-CM

## 2017-04-25 DIAGNOSIS — I48.2: ICD-10-CM

## 2017-04-25 DIAGNOSIS — E78.5: ICD-10-CM

## 2017-04-25 DIAGNOSIS — M12.9: ICD-10-CM

## 2017-04-25 DIAGNOSIS — G93.41: ICD-10-CM

## 2017-04-25 DIAGNOSIS — I13.0: ICD-10-CM

## 2017-04-25 DIAGNOSIS — Z88.2: ICD-10-CM

## 2017-04-25 DIAGNOSIS — I27.2: ICD-10-CM

## 2017-04-25 DIAGNOSIS — J45.909: ICD-10-CM

## 2017-04-25 DIAGNOSIS — M43.16: ICD-10-CM

## 2017-04-25 DIAGNOSIS — I34.1: ICD-10-CM

## 2017-04-25 DIAGNOSIS — Z95.5: ICD-10-CM

## 2017-04-25 DIAGNOSIS — J44.9: ICD-10-CM

## 2017-04-25 DIAGNOSIS — Z88.5: ICD-10-CM

## 2017-04-25 DIAGNOSIS — Z82.49: ICD-10-CM

## 2017-04-25 DIAGNOSIS — Z86.73: ICD-10-CM

## 2017-04-25 DIAGNOSIS — D62: ICD-10-CM

## 2017-04-25 DIAGNOSIS — F17.200: ICD-10-CM

## 2017-04-25 DIAGNOSIS — A41.51: Primary | ICD-10-CM

## 2017-04-25 DIAGNOSIS — T40.605A: ICD-10-CM

## 2017-04-25 DIAGNOSIS — Z79.899: ICD-10-CM

## 2017-04-25 DIAGNOSIS — N39.0: ICD-10-CM

## 2017-04-25 DIAGNOSIS — J84.9: ICD-10-CM

## 2017-04-25 DIAGNOSIS — N18.9: ICD-10-CM

## 2017-04-25 DIAGNOSIS — K92.2: ICD-10-CM

## 2017-04-25 DIAGNOSIS — I48.1: ICD-10-CM

## 2017-04-25 DIAGNOSIS — Z79.82: ICD-10-CM

## 2017-04-25 DIAGNOSIS — I50.32: ICD-10-CM

## 2017-04-25 DIAGNOSIS — I25.10: ICD-10-CM

## 2017-04-25 DIAGNOSIS — M51.36: ICD-10-CM

## 2017-04-25 LAB
ALP SERPL-CCNC: 92 U/L (ref 38–126)
ALT SERPL-CCNC: 50 U/L (ref 9–52)
ANION GAP SERPL CALC-SCNC: 6 MMOL/L
APTT BLD: 26.2 SEC (ref 22–30)
AST SERPL-CCNC: 34 U/L (ref 14–36)
BUN SERPL-SCNC: 52 MG/DL (ref 7–17)
CALCIUM SPEC-MCNC: 8.1 MG/DL (ref 8.4–10.2)
CELLS COUNTED: 100
CH: 24.7
CHCM: 30.3
CHLORIDE SERPL-SCNC: 96 MMOL/L (ref 98–107)
CK SERPL-CCNC: 53 U/L (ref 30–135)
CK SERPL-CCNC: 69 U/L (ref 30–135)
CO2 SERPL-SCNC: 33 MMOL/L (ref 22–30)
ERYTHROCYTE [DISTWIDTH] IN BLOOD BY AUTOMATED COUNT: 4.06 M/UL (ref 3.8–5.4)
ERYTHROCYTE [DISTWIDTH] IN BLOOD: 19.1 % (ref 11.5–15.5)
GLUCOSE BLD-MCNC: 98 MG/DL (ref 75–99)
GLUCOSE SERPL-MCNC: 91 MG/DL (ref 74–99)
HCT VFR BLD AUTO: 33.1 % (ref 34–46)
HDW: 3.77
HGB BLD-MCNC: 9.9 GM/DL (ref 11.4–16)
INR PPP: 1.1 (ref ?–1.1)
MCH RBC QN AUTO: 24.4 PG (ref 25–35)
MCHC RBC AUTO-ENTMCNC: 29.9 G/DL (ref 31–37)
MCV RBC AUTO: 81.3 FL (ref 80–100)
NON-AFRICAN AMERICAN GFR(MDRD): 38
PARTICLE COUNT: (no result)
PH UR: 5.5 [PH] (ref 5–8)
POTASSIUM SERPL-SCNC: 4.4 MMOL/L (ref 3.5–5.1)
PROT SERPL-MCNC: 5.7 G/DL (ref 6.3–8.2)
PT BLD: 11.1 SEC (ref 9–12)
SODIUM SERPL-SCNC: 135 MMOL/L (ref 137–145)
SP GR UR: 1.01 (ref 1–1.03)
TROPONIN I SERPL-MCNC: 0.04 NG/ML (ref 0–0.03)
TROPONIN I SERPL-MCNC: 0.04 NG/ML (ref 0–0.03)
UA BILLING (MACRO VS. MICRO): (no result)
UROBILINOGEN UR QL STRIP: 2 MG/DL (ref ?–2)
WBC # BLD AUTO: 7.9 K/UL (ref 3.8–10.6)
WBC #/AREA URNS HPF: 10 /HPF (ref 0–5)
WBC (PEROX): 7.89

## 2017-04-25 PROCEDURE — 96366 THER/PROPH/DIAG IV INF ADDON: CPT

## 2017-04-25 PROCEDURE — 87086 URINE CULTURE/COLONY COUNT: CPT

## 2017-04-25 PROCEDURE — 93005 ELECTROCARDIOGRAM TRACING: CPT

## 2017-04-25 PROCEDURE — 83550 IRON BINDING TEST: CPT

## 2017-04-25 PROCEDURE — 81001 URINALYSIS AUTO W/SCOPE: CPT

## 2017-04-25 PROCEDURE — 96361 HYDRATE IV INFUSION ADD-ON: CPT

## 2017-04-25 PROCEDURE — 99285 EMERGENCY DEPT VISIT HI MDM: CPT

## 2017-04-25 PROCEDURE — 85025 COMPLETE CBC W/AUTO DIFF WBC: CPT

## 2017-04-25 PROCEDURE — 71020: CPT

## 2017-04-25 PROCEDURE — 84484 ASSAY OF TROPONIN QUANT: CPT

## 2017-04-25 PROCEDURE — 82728 ASSAY OF FERRITIN: CPT

## 2017-04-25 PROCEDURE — 82272 OCCULT BLD FECES 1-3 TESTS: CPT

## 2017-04-25 PROCEDURE — 80306 DRUG TEST PRSMV INSTRMNT: CPT

## 2017-04-25 PROCEDURE — 87186 SC STD MICRODIL/AGAR DIL: CPT

## 2017-04-25 PROCEDURE — 85610 PROTHROMBIN TIME: CPT

## 2017-04-25 PROCEDURE — 94640 AIRWAY INHALATION TREATMENT: CPT

## 2017-04-25 PROCEDURE — 96365 THER/PROPH/DIAG IV INF INIT: CPT

## 2017-04-25 PROCEDURE — 83880 ASSAY OF NATRIURETIC PEPTIDE: CPT

## 2017-04-25 PROCEDURE — 70450 CT HEAD/BRAIN W/O DYE: CPT

## 2017-04-25 PROCEDURE — 74000: CPT

## 2017-04-25 PROCEDURE — 36415 COLL VENOUS BLD VENIPUNCTURE: CPT

## 2017-04-25 PROCEDURE — 87077 CULTURE AEROBIC IDENTIFY: CPT

## 2017-04-25 PROCEDURE — 82550 ASSAY OF CK (CPK): CPT

## 2017-04-25 PROCEDURE — 83540 ASSAY OF IRON: CPT

## 2017-04-25 PROCEDURE — 80053 COMPREHEN METABOLIC PANEL: CPT

## 2017-04-25 PROCEDURE — 51701 INSERT BLADDER CATHETER: CPT

## 2017-04-25 PROCEDURE — 82553 CREATINE MB FRACTION: CPT

## 2017-04-25 PROCEDURE — 83735 ASSAY OF MAGNESIUM: CPT

## 2017-04-25 PROCEDURE — 96376 TX/PRO/DX INJ SAME DRUG ADON: CPT

## 2017-04-25 PROCEDURE — 80048 BASIC METABOLIC PNL TOTAL CA: CPT

## 2017-04-25 PROCEDURE — 85027 COMPLETE CBC AUTOMATED: CPT

## 2017-04-25 PROCEDURE — 85730 THROMBOPLASTIN TIME PARTIAL: CPT

## 2017-04-25 PROCEDURE — 96375 TX/PRO/DX INJ NEW DRUG ADDON: CPT

## 2017-04-25 RX ADMIN — CEFAZOLIN SCH MLS/HR: 330 INJECTION, POWDER, FOR SOLUTION INTRAMUSCULAR; INTRAVENOUS at 17:17

## 2017-04-25 RX ADMIN — Medication SCH: at 20:56

## 2017-04-25 RX ADMIN — ATORVASTATIN CALCIUM SCH MG: 40 TABLET, FILM COATED ORAL at 20:56

## 2017-04-25 RX ADMIN — FAMOTIDINE SCH MG: 20 TABLET, FILM COATED ORAL at 20:56

## 2017-04-25 RX ADMIN — MONTELUKAST SODIUM SCH MG: 10 TABLET, FILM COATED ORAL at 20:56

## 2017-04-25 RX ADMIN — METOPROLOL TARTRATE SCH MG: 25 TABLET, FILM COATED ORAL at 23:20

## 2017-04-25 RX ADMIN — SUCRALFATE SCH GM: 1 TABLET ORAL at 20:56

## 2017-04-25 NOTE — XR
EXAMINATION TYPE: XR abdomen 1V

 

DATE OF EXAM: 4/25/2017 12:26 PM

 

COMPARISON: NONE

 

HISTORY: Pain and rectal bleeding

 

TECHNIQUE: One view abdominal series

 

FINDINGS:  

The osseous structures are intact.  The bowel gas pattern is nonspecific. Scoliotic curvature of the 
spine and severe degenerative multilevel disc disease noted.

 

Extensive calcifications in the abdomen and pelvis likely vascular. Vascular stent noted. Bony protub
erance along the left acetabulum appears chronic. May be related to previous trauma or possibly osteo
chondroma. Hypertrophic change and arthropathy of the hip joints.

 

IMPRESSION:  

1.  Nonspecific abdomen.

## 2017-04-25 NOTE — ED
General Adult HPI





- General


Chief complaint: Altered Mental Status


Stated complaint: altered mental status


Time Seen by Provider: 04/25/17 11:35


Source: patient, family, RN notes reviewed, old records reviewed


Mode of arrival: wheelchair





- History of Present Illness


Initial comments: 





Chief complaint history of present illness 71-year-old female was recently in 

hospital for a GI bleed and confusion.  Patient has multiple medical problems.  

She lives at home with her son.  The daughter is here in emergency room.  She 

reports that for the past 4 days her mother has had similar symptoms that she's 

had the past including increased confusion, weakness and fatigue.  She needs 

help to get up but that she can use her walker to get to the toilet but she 

needs help getting off the toilet.  She also reportedly had blood in her stool 

for 2 days 2 days ago.  Daughter reports that she did not see any blood in the 

stool today.





- Related Data


 Home Medications











 Medication  Instructions  Recorded  Confirmed


 


Montelukast Sodium [Singulair] 10 mg PO HS 09/20/14 04/25/17


 


ALPRAZolam [Xanax] 0.5 mg PO HS 12/05/15 04/25/17


 


Apixaban [Eliquis] 2.5 mg PO BID 12/05/15 04/25/17


 


Metoprolol Tartrate [Lopressor] 25 mg PO BID 12/05/15 04/25/17


 


Nitroglycerin Sl Tabs [Nitrostat] 0.4 mg SUBLINGUAL Q5M PRN 12/05/15 04/25/17


 


Atorvastatin [Lipitor] 40 mg PO HS 12/06/15 04/25/17


 


Ergocalciferol [Vitamin D2 50,000 unit PO Q7D 03/15/17 04/25/17





(DRISDOL)]   


 


Ipratropium-Albuterol Nebulize 3 ml INHALATION RT-QID PRN 03/15/17 04/25/17





[Duoneb 0.5 mg-3 mg/3 ml Soln]   


 


Melatonin 3 mg PO HS 03/15/17 04/25/17


 


Multivitamins, Thera [Multivitamin 1 tab PO DAILY 03/15/17 04/25/17





(formulary)]   


 


Omeprazole 40 mg PO DAILY 03/15/17 04/25/17


 


amLODIPine [Norvasc] 10 mg PO DAILY 03/15/17 04/25/17


 


hydrALAZINE HCL [Apresoline] 75 mg PO BID 03/15/17 04/25/17


 


oxyCODONE HCL 20 mg PO TID 03/15/17 04/25/17


 


Aspirin 81 mg PO DAILY 04/04/17 04/25/17


 


Furosemide [Lasix] 40 mg PO DAILY 04/04/17 04/25/17


 


Losartan Potassium [Cozaar] 100 mg PO DAILY 04/04/17 04/25/17


 


Potassium Chloride ER [K-Dur 20] 20 meq PO DAILY 04/04/17 04/25/17








 Previous Rx's











 Medication  Instructions  Recorded


 


Sucralfate [Carafate] 1 gm PO BID #600 ml 03/20/17


 


Ferrous Sulfate [Iron (65  mg PO W/LUNCH  tab 04/08/17





Elemental)]  











 Allergies











Allergy/AdvReac Type Severity Reaction Status Date / Time


 


diphenhydramine Allergy  Unknown Verified 04/25/17 13:45





[From Benadryl]     


 


furosemide [From Lasix] Allergy  Unknown Verified 04/25/17 13:45


 


latex Allergy  Rash/Hives Verified 04/25/17 13:45


 


morphine Allergy  Rash/Hives Verified 04/25/17 13:45


 


pregabalin [From Lyrica] Allergy  Unknown Verified 04/25/17 13:45


 


Sulfa (Sulfonamide Allergy  Unknown Verified 04/25/17 13:45





Antibiotics)     














Review of Systems


ROS Statement: 


Those systems with pertinent positive or pertinent negative responses have been 

documented in the HPI.


Review of systems.  Patient's alertness to she is in where she is at.  Daughter 

states that she's had developing dementia with difficulty and confusion over 

the past year slightly worse his past 4 days.  Patient denies any chest pain or 

shortness of breath this time complains of mild abdominal discomfort.  He had 

bleeding several weeks ago and reportedly 2 days ago as well which since 

stopped.  The patient is on L Oquist for A. fib.  She had stopped it for a 

period of time and then restarted it recently.  Patient generally weak and 

needs assistance in getting up and moving is able to use a walker but then 

needs help getting off the toilet.  All systems are reviewed.





Past medical problems significant for A. fib on L Oquist.  Also COPD but 

daughter reports she's increased her smoking lately.  Patient's had a stroke 

last several months.   Daughter states that she has not significantly changed 

her speech pattern since that stroke.  Also history of GI bleed with 

diverticulitis.  Hyperlipidemia, hypertension, mitral valve prolapse, chronic 

back pain.  The patient's also history of DVT.  Her surgeries include cardiac 

catheterization.  Patient daughter does not remember if she had a cardiac stent 

but she does think she had a carotid stent and a femoral stent.  He also had a 

hysterectomy, orthopedic surgery and back surgery.  Family history not 

respiratory.  The patient's ALLERGIES are diphenhydramine, Lasix, latex, 

morphine, bring about 1, and sulfa.


ROS Other: All systems not noted in ROS Statement are negative.





Past Medical History


Past Medical History: Atrial Fibrillation, Asthma, Coronary Artery Disease (CAD)

, COPD, CVA/TIA, GERD/Reflux, GI Bleed, Hyperlipidemia, Hypertension, Mitral 

Valve Prolapse (MVP), Musculoskeletal Disorder, Osteoarthritis (OA), 

Respiratory Disorder, Vascular Disorder


Additional Past Medical History / Comment(s): diverticulitits


Last Myocardial Infarction Date:: unknown


History of Any Multi-Drug Resistant Organisms: None Reported


Past Surgical History: Cholecystectomy, Heart Catheterization With Stent, 

Hysterectomy, Orthopedic Surgery


Additional Past Surgical History / Comment(s): carotid, back sx


Past Anesthesia/Blood Transfusion Reactions: No Reported Reaction


Date of Last Stent Placement:: 2011


Past Psychological History: No Psychological Hx Reported


Smoking Status: Current every day smoker


Past Alcohol Use History: None Reported


Past Drug Use History: None Reported





- Past Family History


  ** Son(s)


Family Medical History: No Reported History





  ** Daughter(s)


Family Medical History: No Reported History





  ** Mother


Family Medical History: Congestive Heart Failure (CHF)





  ** Father


Family Medical History: Cancer


Additional Family Medical History / Comment(s): Bone ca





  ** Sister(s)


Family Medical History: Coronary Artery Disease (CAD)





  ** Brother(s)


Family Medical History: Coronary Artery Disease (CAD), Myocardial Infarction (MI

)





General Exam





- General Exam Comments


Initial Comments: 





General:


The patient is awake and answering questions appropriately.  Daughter reports 

she seems more confused over the past 4 days.  Patient denies any visual acuity 

changes she does appear pale.  Denying any chest pain or shortness of breath.  

Mild discomfort with palpation of the abdomen.  Generally weak needs assistance 

in getting up but she is able to ambulate with a walker.  Vital signs show 

temperature 98.4 pulse 103 respiratory rate 18 pulse ox 91% on room air.  Blood 

pressure 143/63


Eye:


Pupils are equal, round and reactive to light, extra-ocular movements are intact

; there is normal conjunctiva bilaterally. No signs of icterus. 


Ears, nose, mouth and throat:


There are moist mucous membranes and no oral lesions. 


Neck:


The neck is supple, there is no tenderness .


Cardiovascular:


Tachycardic heart rate, 103.  No murmur, rub or gallop is appreciated.


Respiratory:


Lungs are clear to auscultation, respirations are non-labored, breath sounds 

are equal. No wheezes, stridor, rales, or rhonchi.


Gastrointestinal:


Mild tenderness with deep palpation of the lower abdomen.  No guarding no 

referred pain rebound.


Back:


There is no tenderness to palpation in the midline. There is no obvious 

deformity. No rashes noted. 


Musculoskeletal:


Normal ROM, no tenderness, chronic mild peripheral edema.  Daughter says is not 

changed since discharge from hospital .


Neurological:


Neurologically the patient oriented 4.  Able to hold arms up without drifting 

but states she is tired and drops them.  Able to bend her left knee but refuses 

to bend the right because it hurts.  Can wiggle her toes and move her ankles.


Skin:


Pale skin color


 





Course


 Vital Signs











  04/25/17 04/25/17 04/25/17





  11:11 11:30 12:34


 


Temperature 98.4 F  


 


Pulse Rate 103 H 92 88


 


Respiratory 18  





Rate   


 


Blood Pressure 143/63 108/52 116/52


 


O2 Sat by Pulse 91 L  95





Oximetry   














  04/25/17 04/25/17





  13:00 13:30


 


Temperature  


 


Pulse Rate 88 86


 


Respiratory  





Rate  


 


Blood Pressure 113/54 109/54


 


O2 Sat by Pulse 96 90 L





Oximetry  














EKG Findings





- EKG Comments:


EKG Findings:: EKG was done and reviewed at 1231 showing atrial fibrillation 

age undetermined septal inferior infarct.  Rate 88 , QRS duration 82  QTc 

447.  No acute ST elevation.  Dr. Avila.  This EKG was compared to one done on 

April 7 of this year and they are similar.  Dr. Avila





Medical Decision Making





- Medical Decision Making





Medical decision making the patient's white count 7.9 hemoglobin 9.9 hematocrit 

33.  INR 1.1.  The patient's potassium 4.4, BUN is 52 creatinine 1.3 with a GFR 

of 38.  Troponin 0.039.  Total bilirubin mildly elevated 1.4.  Urine shows 10 

WBCs and large leuk esterase.  Urine drug screen positive for opiates and 

benzodiazepines.  Stool is guaiac positive at bedside.











Chest x-ray was done and reviewed radiologist entire report was reviewed his 

final impression is improving interstitial changes suggestive of resolving 

congestion and areas of infiltrate.  COPD underlying chronic interstitial lung 

disease persist.  As read by Dr. Lovett





X-ray of the abdomen was done and reviewed by radiologist entire report was 

reviewed and his final impression is nonspecific abdomen as read by Dr. Lovett





I discussed the case with the patient's attending Dr. Hood.  Patient will be 

admitted his service with cardiology consultation.  Also be treated for urinary 

tract infection.





- Lab Data


Result diagrams: 


 04/25/17 11:52





 04/25/17 11:52


 Lab Results











  04/25/17 04/25/17 04/25/17 Range/Units





  11:52 11:52 11:52 


 


WBC   7.9   (3.8-10.6)  k/uL


 


RBC   4.06   (3.80-5.40)  m/uL


 


Hgb   9.9 L   (11.4-16.0)  gm/dL


 


Hct   33.1 L   (34.0-46.0)  %


 


MCV   81.3   (80.0-100.0)  fL


 


MCH   24.4 L   (25.0-35.0)  pg


 


MCHC   29.9 L   (31.0-37.0)  g/dL


 


RDW   19.1 H   (11.5-15.5)  %


 


Plt Count   123 L   (150-450)  k/uL


 


Neutrophils % (Manual)   81.0   %


 


Band Neutrophils %   2.0   %


 


Lymphocytes % (Manual)   11.0   %


 


Monocytes % (Manual)   5.0   %


 


Eosinophils % (Manual)   1.0   %


 


Neutrophils # (Manual)   6.6   (1.3-7.7)  k/uL


 


Lymphocytes # (Manual)   0.9 L   (1.0-4.8)  k/uL


 


Monocytes # (Manual)   0.4   (0-1.0)  k/uL


 


Eosinophils # (Manual)   0.1   (0-0.7)  k/uL


 


Nucleated RBCs   0   (0-0)  /100 WBC


 


Manual Slide Review   Performed   


 


Hypochromasia   Marked   


 


Poikilocytosis   Slight   


 


Anisocytosis   Slight   


 


Microcytosis   Slight   


 


PT     (9.0-12.0)  sec


 


INR     (<1.1)  


 


APTT     (22.0-30.0)  sec


 


Sodium    135 L  (137-145)  mmol/L


 


Potassium    4.4  (3.5-5.1)  mmol/L


 


Chloride    96 L  ()  mmol/L


 


Carbon Dioxide    33 H  (22-30)  mmol/L


 


Anion Gap    6  mmol/L


 


BUN    52 H  (7-17)  mg/dL


 


Creatinine    1.38 H  (0.52-1.04)  mg/dL


 


Est GFR (MDRD) Af Amer    46  (>60 ml/min/1.73 sqM)  


 


Est GFR (MDRD) Non-Af    38  (>60 ml/min/1.73 sqM)  


 


Glucose    91  (74-99)  mg/dL


 


POC Glucose (mg/dL)     (75-99)  mg/dL


 


POC Glu Operater ID     


 


Calcium    8.1 L  (8.4-10.2)  mg/dL


 


Total Bilirubin    1.4 H  (0.2-1.3)  mg/dL


 


AST    34  (14-36)  U/L


 


ALT    50  (9-52)  U/L


 


Alkaline Phosphatase    92  ()  U/L


 


Total Creatine Kinase  69    ()  U/L


 


CK-MB (CK-2)  2.0    (0.0-2.4)  ng/mL


 


CK-MB (CK-2) Rel Index  2.9    


 


Troponin I  0.039 H*    (0.000-0.034)  ng/mL


 


Total Protein    5.7 L  (6.3-8.2)  g/dL


 


Albumin    2.8 L  (3.5-5.0)  g/dL


 


Urine Color     


 


Urine Appearance     (Clear)  


 


Urine pH     (5.0-8.0)  


 


Ur Specific Gravity     (1.001-1.035)  


 


Urine Protein     (Negative)  


 


Urine Glucose (UA)     (Negative)  


 


Urine Ketones     (Negative)  


 


Urine Blood     (Negative)  


 


Urine Nitrite     (Negative)  


 


Urine Bilirubin     (Negative)  


 


Urine Urobilinogen     (<2.0)  mg/dL


 


Ur Leukocyte Esterase     (Negative)  


 


Urine WBC     (0-5)  /hpf


 


Urine WBC Clumps     (None)  /hpf


 


Urine Bacteria     (None)  /hpf


 


Urine Mucus     (None)  /hpf


 


Stool Occult Blood     (Negative)  


 


Urine Opiates Screen     (NotDetected)  


 


Ur Oxycodone Screen     (NotDetected)  


 


Urine Methadone Screen     (NotDetected)  


 


Ur Propoxyphene Screen     (NotDetected)  


 


Ur Barbiturates Screen     (NotDetected)  


 


U Tricyclic Antidepress     (NotDetected)  


 


Ur Phencyclidine Scrn     (NotDetected)  


 


Ur Amphetamines Screen     (NotDetected)  


 


U Methamphetamines Scrn     (NotDetected)  


 


U Benzodiazepines Scrn     (NotDetected)  


 


Urine Cocaine Screen     (NotDetected)  


 


U Marijuana (THC) Screen     (NotDetected)  














  04/25/17 04/25/17 04/25/17 Range/Units





  11:52 11:53 12:47 


 


WBC     (3.8-10.6)  k/uL


 


RBC     (3.80-5.40)  m/uL


 


Hgb     (11.4-16.0)  gm/dL


 


Hct     (34.0-46.0)  %


 


MCV     (80.0-100.0)  fL


 


MCH     (25.0-35.0)  pg


 


MCHC     (31.0-37.0)  g/dL


 


RDW     (11.5-15.5)  %


 


Plt Count     (150-450)  k/uL


 


Neutrophils % (Manual)     %


 


Band Neutrophils %     %


 


Lymphocytes % (Manual)     %


 


Monocytes % (Manual)     %


 


Eosinophils % (Manual)     %


 


Neutrophils # (Manual)     (1.3-7.7)  k/uL


 


Lymphocytes # (Manual)     (1.0-4.8)  k/uL


 


Monocytes # (Manual)     (0-1.0)  k/uL


 


Eosinophils # (Manual)     (0-0.7)  k/uL


 


Nucleated RBCs     (0-0)  /100 WBC


 


Manual Slide Review     


 


Hypochromasia     


 


Poikilocytosis     


 


Anisocytosis     


 


Microcytosis     


 


PT  11.1    (9.0-12.0)  sec


 


INR  1.1    (<1.1)  


 


APTT  26.2    (22.0-30.0)  sec


 


Sodium     (137-145)  mmol/L


 


Potassium     (3.5-5.1)  mmol/L


 


Chloride     ()  mmol/L


 


Carbon Dioxide     (22-30)  mmol/L


 


Anion Gap     mmol/L


 


BUN     (7-17)  mg/dL


 


Creatinine     (0.52-1.04)  mg/dL


 


Est GFR (MDRD) Af Amer     (>60 ml/min/1.73 sqM)  


 


Est GFR (MDRD) Non-Af     (>60 ml/min/1.73 sqM)  


 


Glucose     (74-99)  mg/dL


 


POC Glucose (mg/dL)   98   (75-99)  mg/dL


 


POC Glu Operater ID   Ariella Price   


 


Calcium     (8.4-10.2)  mg/dL


 


Total Bilirubin     (0.2-1.3)  mg/dL


 


AST     (14-36)  U/L


 


ALT     (9-52)  U/L


 


Alkaline Phosphatase     ()  U/L


 


Total Creatine Kinase     ()  U/L


 


CK-MB (CK-2)     (0.0-2.4)  ng/mL


 


CK-MB (CK-2) Rel Index     


 


Troponin I     (0.000-0.034)  ng/mL


 


Total Protein     (6.3-8.2)  g/dL


 


Albumin     (3.5-5.0)  g/dL


 


Urine Color    Yellow  


 


Urine Appearance    Cloudy H  (Clear)  


 


Urine pH    5.5  (5.0-8.0)  


 


Ur Specific Gravity    1.011  (1.001-1.035)  


 


Urine Protein    Trace H  (Negative)  


 


Urine Glucose (UA)    Negative  (Negative)  


 


Urine Ketones    Negative  (Negative)  


 


Urine Blood    Negative  (Negative)  


 


Urine Nitrite    Negative  (Negative)  


 


Urine Bilirubin    Negative  (Negative)  


 


Urine Urobilinogen    2.0  (<2.0)  mg/dL


 


Ur Leukocyte Esterase    Large H  (Negative)  


 


Urine WBC    10 H  (0-5)  /hpf


 


Urine WBC Clumps    Few H  (None)  /hpf


 


Urine Bacteria    Rare H  (None)  /hpf


 


Urine Mucus    Rare H  (None)  /hpf


 


Stool Occult Blood     (Negative)  


 


Urine Opiates Screen    Not Detected  (NotDetected)  


 


Ur Oxycodone Screen    Detected H  (NotDetected)  


 


Urine Methadone Screen    Not Detected  (NotDetected)  


 


Ur Propoxyphene Screen    Not Detected  (NotDetected)  


 


Ur Barbiturates Screen    Not Detected  (NotDetected)  


 


U Tricyclic Antidepress    Not Detected  (NotDetected)  


 


Ur Phencyclidine Scrn    Not Detected  (NotDetected)  


 


Ur Amphetamines Screen    Not Detected  (NotDetected)  


 


U Methamphetamines Scrn    Not Detected  (NotDetected)  


 


U Benzodiazepines Scrn    Detected H  (NotDetected)  


 


Urine Cocaine Screen    Not Detected  (NotDetected)  


 


U Marijuana (THC) Screen    Not Detected  (NotDetected)  














  04/25/17 Range/Units





  13:41 


 


WBC   (3.8-10.6)  k/uL


 


RBC   (3.80-5.40)  m/uL


 


Hgb   (11.4-16.0)  gm/dL


 


Hct   (34.0-46.0)  %


 


MCV   (80.0-100.0)  fL


 


MCH   (25.0-35.0)  pg


 


MCHC   (31.0-37.0)  g/dL


 


RDW   (11.5-15.5)  %


 


Plt Count   (150-450)  k/uL


 


Neutrophils % (Manual)   %


 


Band Neutrophils %   %


 


Lymphocytes % (Manual)   %


 


Monocytes % (Manual)   %


 


Eosinophils % (Manual)   %


 


Neutrophils # (Manual)   (1.3-7.7)  k/uL


 


Lymphocytes # (Manual)   (1.0-4.8)  k/uL


 


Monocytes # (Manual)   (0-1.0)  k/uL


 


Eosinophils # (Manual)   (0-0.7)  k/uL


 


Nucleated RBCs   (0-0)  /100 WBC


 


Manual Slide Review   


 


Hypochromasia   


 


Poikilocytosis   


 


Anisocytosis   


 


Microcytosis   


 


PT   (9.0-12.0)  sec


 


INR   (<1.1)  


 


APTT   (22.0-30.0)  sec


 


Sodium   (137-145)  mmol/L


 


Potassium   (3.5-5.1)  mmol/L


 


Chloride   ()  mmol/L


 


Carbon Dioxide   (22-30)  mmol/L


 


Anion Gap   mmol/L


 


BUN   (7-17)  mg/dL


 


Creatinine   (0.52-1.04)  mg/dL


 


Est GFR (MDRD) Af Amer   (>60 ml/min/1.73 sqM)  


 


Est GFR (MDRD) Non-Af   (>60 ml/min/1.73 sqM)  


 


Glucose   (74-99)  mg/dL


 


POC Glucose (mg/dL)   (75-99)  mg/dL


 


POC Glu Operater ID   


 


Calcium   (8.4-10.2)  mg/dL


 


Total Bilirubin   (0.2-1.3)  mg/dL


 


AST   (14-36)  U/L


 


ALT   (9-52)  U/L


 


Alkaline Phosphatase   ()  U/L


 


Total Creatine Kinase   ()  U/L


 


CK-MB (CK-2)   (0.0-2.4)  ng/mL


 


CK-MB (CK-2) Rel Index   


 


Troponin I   (0.000-0.034)  ng/mL


 


Total Protein   (6.3-8.2)  g/dL


 


Albumin   (3.5-5.0)  g/dL


 


Urine Color   


 


Urine Appearance   (Clear)  


 


Urine pH   (5.0-8.0)  


 


Ur Specific Gravity   (1.001-1.035)  


 


Urine Protein   (Negative)  


 


Urine Glucose (UA)   (Negative)  


 


Urine Ketones   (Negative)  


 


Urine Blood   (Negative)  


 


Urine Nitrite   (Negative)  


 


Urine Bilirubin   (Negative)  


 


Urine Urobilinogen   (<2.0)  mg/dL


 


Ur Leukocyte Esterase   (Negative)  


 


Urine WBC   (0-5)  /hpf


 


Urine WBC Clumps   (None)  /hpf


 


Urine Bacteria   (None)  /hpf


 


Urine Mucus   (None)  /hpf


 


Stool Occult Blood  Negative  (Negative)  


 


Urine Opiates Screen   (NotDetected)  


 


Ur Oxycodone Screen   (NotDetected)  


 


Urine Methadone Screen   (NotDetected)  


 


Ur Propoxyphene Screen   (NotDetected)  


 


Ur Barbiturates Screen   (NotDetected)  


 


U Tricyclic Antidepress   (NotDetected)  


 


Ur Phencyclidine Scrn   (NotDetected)  


 


Ur Amphetamines Screen   (NotDetected)  


 


U Methamphetamines Scrn   (NotDetected)  


 


U Benzodiazepines Scrn   (NotDetected)  


 


Urine Cocaine Screen   (NotDetected)  


 


U Marijuana (THC) Screen   (NotDetected)  














Disposition


Clinical Impression: 


 GI bleed, Chronic renal failure, Urinary tract infection





Disposition: ADMITTED AS IP TO THIS [facility] [place]


Condition: Poor

## 2017-04-25 NOTE — XR
EXAMINATION TYPE: XR chest 2V

 

DATE OF EXAM: 4/25/2017 12:19 PM

 

COMPARISON: 4/5/2017

 

TECHNIQUE: PA and lateral views submitted.

 

HISTORY: Mental status change

 

FINDINGS:

Chronic pectus deformity. Degenerative change of the spine. Heart is enlarged. There is a persistent 
area of increased nodular density along the anterior margin of the left first rib. Recent CT scan doc
uments this is part of a hypertrophic portion of the anterior first rib.

 

There is interval improvement of the interstitium with persistent underlying changes of COPD and susp
ected pulmonary fibrosis. Vague area of nodularity or residual consolidation left upper lobe perihila
r region. Follow-up to resolution recommended.

 

Arthropathy of the shoulder noted.

 

IMPRESSION:

1. Improving interstitial changes suggestive of resolving congestion and areas of infiltrate. COPD an
d underlying chronic interstitial lung disease persist.

## 2017-04-26 LAB
CK SERPL-CCNC: 36 U/L (ref 30–135)
CK SERPL-CCNC: 37 U/L (ref 30–135)
GLUCOSE BLD-MCNC: 122 MG/DL (ref 75–99)
TROPONIN I SERPL-MCNC: 0.04 NG/ML (ref 0–0.03)
TROPONIN I SERPL-MCNC: 0.04 NG/ML (ref 0–0.03)

## 2017-04-26 RX ADMIN — FAMOTIDINE SCH MG: 20 TABLET, FILM COATED ORAL at 20:17

## 2017-04-26 RX ADMIN — Medication SCH MG: at 12:31

## 2017-04-26 RX ADMIN — ATORVASTATIN CALCIUM SCH MG: 40 TABLET, FILM COATED ORAL at 20:17

## 2017-04-26 RX ADMIN — FUROSEMIDE SCH MG: 10 INJECTION, SOLUTION INTRAMUSCULAR; INTRAVENOUS at 20:17

## 2017-04-26 RX ADMIN — ACETAMINOPHEN PRN MG: 325 TABLET, FILM COATED ORAL at 07:01

## 2017-04-26 RX ADMIN — ACETAMINOPHEN PRN MG: 325 TABLET, FILM COATED ORAL at 23:28

## 2017-04-26 RX ADMIN — MONTELUKAST SODIUM SCH MG: 10 TABLET, FILM COATED ORAL at 20:17

## 2017-04-26 RX ADMIN — POTASSIUM CHLORIDE SCH MEQ: 20 TABLET, EXTENDED RELEASE ORAL at 10:18

## 2017-04-26 RX ADMIN — FAMOTIDINE SCH MG: 20 TABLET, FILM COATED ORAL at 08:25

## 2017-04-26 RX ADMIN — METOPROLOL TARTRATE SCH MG: 25 TABLET, FILM COATED ORAL at 20:17

## 2017-04-26 RX ADMIN — METOPROLOL TARTRATE SCH MG: 25 TABLET, FILM COATED ORAL at 08:26

## 2017-04-26 RX ADMIN — FUROSEMIDE SCH MG: 10 INJECTION, SOLUTION INTRAMUSCULAR; INTRAVENOUS at 10:18

## 2017-04-26 RX ADMIN — CEFAZOLIN SCH MLS/HR: 330 INJECTION, POWDER, FOR SOLUTION INTRAMUSCULAR; INTRAVENOUS at 05:21

## 2017-04-26 RX ADMIN — FUROSEMIDE SCH: 40 TABLET ORAL at 08:37

## 2017-04-26 RX ADMIN — IPRATROPIUM BROMIDE AND ALBUTEROL SULFATE PRN ML: .5; 3 SOLUTION RESPIRATORY (INHALATION) at 21:25

## 2017-04-26 RX ADMIN — SUCRALFATE SCH GM: 1 TABLET ORAL at 20:17

## 2017-04-26 RX ADMIN — SUCRALFATE SCH GM: 1 TABLET ORAL at 08:26

## 2017-04-26 RX ADMIN — LOSARTAN POTASSIUM SCH: 50 TABLET, FILM COATED ORAL at 12:28

## 2017-04-26 RX ADMIN — LEVOFLOXACIN SCH MLS/HR: 5 INJECTION, SOLUTION INTRAVENOUS at 13:50

## 2017-04-26 NOTE — CT
EXAMINATION TYPE: CT brain wo con

 

DATE OF EXAM: 4/26/2017 7:33 AM

 

COMPARISON: 3/15/2017

 

HISTORY: Lethargic, altered mental status

 

CT DLP: 1106 mGycm

 

Unenhanced CT of the brain was performed.  

 

The ventricles, basal cisterns and sulci overlying the cerebral convexities demonstrate mild to moder
ate enlargement. Areas of remote insult does include the left frontal region in the right occipital l
obe.

 

There is no evidence for intracranial hemorrhage or sulcal effacement.  

 

There is decreased attenuation about the periventricular white matter and deep white matter of both c
erebral hemispheres, compatible with chronic small vessel ischemia. Differential diagnosis does inclu
de demyelination. 

 

No mass effects are seen.No midline shift.

 

Osseous calvarium is intact.  

 

If symptoms persist consider MRI.  

 

IMPRESSION:

 

1. Age related atrophic and chronic small vessel ischemic change without acute intracranial process s
een at this time.

## 2017-04-26 NOTE — P.PN
Subjective


This is a 77-year-old  female one of my patient with a previous 

medical history significant for CAD post-PCI of the LAD, hypertension and 

hypertensive cardio vascular disease with left ventricular hypertrophy, 

hyperlipidemia, history of the for arterial occlusive disease, carotid artery 

disease status post right carotid endarterectomy with about 70% stenosis of 

bilateral carotid arteries, has been under the care of cardiology, pulmonary 

medicine, vascular surgery, gastroenterology, she was recently hospitalized at 

[facility] in 03/16/2017 after she was admitted for acute 

respiratory failure secondary to acute diastolic heart failure and was seen and 

evaluated by pulmonary and cardiology along with gastroenterology at that time 

she was given 2 units of packed red blood cells and the patient was sent home , 

subsequently she followed with me as an outpatient, patient developed to have a 

significant anemia with significant GI bleed at that time she was sent to the 

ER in 04/08/2017 she was treated there and the patient was sent home however he 

was recommended for the patient to go back and her Eliquis despite the fact 

that she has been anemic and has been having some blood loss with recurrent 

transfusion, patient was very weak to day after she had a bowel movement and 

stated that there is blood in stool ,so her son Ivan brought her to the 

emergency department at [facility] today with mental status changes 

she was found to have a urinary tract infection as well as encephalopathy at 

the same time stated that she has been taking her Eliquis at this time on the 

recommendations of her cardiologist, patient did have a hemoglobin of 9.9, she 

did have a urinary tract infection and she shows some signs of encephalopathy 

thought to be due to pain medication.





4/26: Patient has been seen by cardiology with recommendations to resume 

eliquis and aspirin, 2 doses of IV Lasix and change back to oral in the morning 

at her home dose.  Troponins have been 0.044, 0.035 and 0.045.  ProBNP was 3540.











Objective





- Vital Signs


Vital signs: 


 Vital Signs











Temp  100.2 F H  04/26/17 04:00


 


Pulse  83   04/26/17 04:00


 


Resp  20   04/26/17 04:00


 


BP  121/56   04/26/17 04:00


 


Pulse Ox  97   04/26/17 04:00








 Intake & Output











 04/25/17 04/26/17 04/26/17





 18:59 06:59 18:59


 


Intake Total 


 


Output Total  100 


 


Balance 


 


Weight 70.307 kg 65.4 kg 


 


Intake:   


 


  IV   960


 


    Sodium Chloride 0.9% 1,   960





    000 ml @ 80 mls/hr IV .   





    [id number] CORBIN Rx#:[id number]   


 


  Intake, IV Titration 160  





  Amount   


 


    Sodium Chloride 0.9% 1, 160  





    000 ml @ 80 mls/hr IV .   





    [id number] CORBIN Rx#:[id number]   


 


  Oral 100 236 118


 


Output:   


 


  Urine  100 


 


Other:   


 


  Voiding Method Bedside Commode Bedside Commode 





  Incontinent 


 


  # Voids  1 0














- Exam





General appearance: average body habitus, mild distress





- EENT


Eyes: anicteric sclerae, EOMI, PERRLA, no ptosis, no scleral icterus, normal 

appearance


ENT: hard of hearing, normal oropharynx, no thrush


Ears: bilateral: normal





- Neck


Neck: no lymphadenopathy, normal ROM, no rigidity, no stridor, no thyromegaly


Carotids: bilateral: upstroke delayed, bruit present


Thyroid: bilateral: normal size





- Respiratory


Respiratory: bilateral: diminished, rhonchi, wheezing, prolonged expiration, 

negative: dullness, rales





- Cardiovascular


Rhythm: regular


Heart sounds: normal: S1, S2


Abnormal Heart Sounds: systolic murmur





- Gastrointestinal


General gastrointestinal: normal bowel sounds, soft, no tenderness, no 

umbilical hernia, no ventral hernia





- Integumentary


Integumentary: normal, normal turgor





- Neurologic


Neurologic: CNII-XII intact





- Musculoskeletal


Musculoskeletal: generalized weakness, strength equal bilaterally





- Psychiatric


Psychiatric: A&O x's 3, appropriate affect, intact judgment & insight





- Labs


CBC & Chem 7: 


 04/25/17 11:52





 04/25/17 11:52


Labs: 


 Abnormal Lab Results - Last 24 Hours (Table)











  04/25/17 04/26/17 04/26/17 Range/Units





  18:54 00:06 05:46 


 


Troponin I  0.044 H*  0.035 H*  0.045 H*  (0.000-0.034)  ng/mL














Assessment and Plan


Plan: 





1.  Metabolic encephalopathy due to pain management and possible UTI with 

sepsis.  We will continue to monitor the patient very closely, urine culture, 

blood culture, start the patient on IV antibiotic in the form of levaquin 500 

mg IVPB q 48 h,urine culture was sent.





2.  Acute/chronic blood loss anemia secondary to possible GI bleed as well as 

hemoptysis.  Hemoglobin stable at 9.9.





3. CAD post-PCI of the LAD.  Continue metoprolol 25 mg orally twice every day, 

Lipitor 40 mg orally once every day, losartan 50 mg orally once every day, and 

metoprolol 25 mg orally twice every day.





4.  History of CVA of the left frontal lobe.resume Eliquis 2.5 mg po bid and 

monitor HGB in the hospital.





5.  Carotid artery disease as well as PAD.  Currently under the care of 

vascular surgery continue Lipitor 40 mg orally once every day for secondary 

prevention.





6.  Hypertension and hypertensive cardiovascular disease.  Continue losartan 50 

mg orally once every day, metoprolol 25 mg orally twice every day, hydralazine 

75 mg orally 2 times every day.





7.  Hyperlipidemia.  Continue Lipitor 40 mg orally once every day.





8.  ALLERGIC rhinitis.  Continue singular 10 mg at bedtime.





9.  Lumbar degenerative disc disease with spondylolisthesis and facet 

arthropathy.  Post epidural injection.





9. Chronic atrial fibrillation.will continue metoprolol 25 mg orally twice 

every day and restart Eliquis .





10.  DVT prophylaxis.  Patient will be on Eliquis for now she will have knee-

high KEVIN hose as well as SCD's.





11.  GI prophylaxis.  Continue Protonix 40 mg orally once every day along with 

Carafate 1 g orally twice every day.





12.  Patient is full code.





13.  Admit to inpatient.  Estimate length of stay 2 midnights.





14.  Prognosis is guarded.





Discharge plan: Return home tomorrow





Impression and plan of care have been directed as dictated by the signing 

physician.  Danielle Herbert nurse practitioner acting as scribe for signing 

physician.


Time with Patient: Greater than 30

## 2017-04-26 NOTE — P.CRDCN
History of Present Illness


Consult date: 04/26/17


Requesting physician: Kinjal Hood


Consult reason: atrial fibrillation


Chief complaint: Weakness


History of present illness: 


This is a pleasant 71-year-old  female with known history of coronary 

artery disease and prior LAD stenting, hypertension, hyperlipidemia, chronic 

renal failure,prior CVA, PAD with prior carotid endarterectomy, chronic 

persistent atrial fibrillation, on Eliquis, who was recently in the hospital on 

the fourth of this month with acute respiratory failure secondary to hemoptysis 

and left upper lobe pneumonia as well as exacerbation of COPD.  Patient at that 

time also had acute blood loss anemia and was transfused with 2 units of packed 

red blood cells.  She was discharged home from the hospital on that occasion on 

Eliquis 2-1/2 mg one tablet by mouth twice a day, and a baby aspirin daily.  

She presents to the hospital with recurrent symptoms of extreme weakness, she 

also states that when she had a bowel movement, she noticed some blood on the 

tissue.  She does state that her bowel movement was normal in color.  EKG shows 

atrial fibrillation with a controlled ventricular response.  Chest x-ray shows 

improving interstitial changes suggestive of resolving congestion and areas of 

infiltrate.  Abdominal x-ray negative.  CAT scan of the brain revealed age-

related atrophic and chronic small vessel ischemic change without acute 

intracranial process.  The pressure 120/60, heart rate in the 80s, temperature 

100.2.  A blood cell count normal, hemoglobin 9.9 hemoglobin at time of 

discharge 9.1.  Potassium 4.4, BUN 52, creatinine 1.3.  Troponins 0.039, 0.044, 

0.035, 0.045.  Stool for occult blood is negative.  At the time of my 

examination this morning, patient's only complaint is that of feeling extremely 

tired, she states she hasn't slept well for the past couple nights.  She 

continues to have a low-grade temperature this morning. 





Past Medical History


Past Medical History: Atrial Fibrillation, Asthma, Coronary Artery Disease (CAD)

, COPD, CVA/TIA, GERD/Reflux, GI Bleed, Hyperlipidemia, Hypertension, Mitral 

Valve Prolapse (MVP), Musculoskeletal Disorder, Osteoarthritis (OA), 

Respiratory Disorder, Vascular Disorder


Additional Past Medical History / Comment(s): diverticulitits


Last Myocardial Infarction Date:: unknown


History of Any Multi-Drug Resistant Organisms: None Reported


Past Surgical History: Cholecystectomy, Heart Catheterization With Stent, 

Hysterectomy, Orthopedic Surgery


Additional Past Surgical History / Comment(s): carotid, back sx


Past Anesthesia/Blood Transfusion Reactions: No Reported Reaction


Date of Last Stent Placement:: 2011


Past Psychological History: No Psychological Hx Reported


Smoking Status: Current some day smoker


Past Alcohol Use History: None Reported


Past Drug Use History: None Reported





- Past Family History


  ** Son(s)


Family Medical History: No Reported History





  ** Daughter(s)


Family Medical History: No Reported History





  ** Mother


Family Medical History: Congestive Heart Failure (CHF)





  ** Father


Family Medical History: Cancer


Additional Family Medical History / Comment(s): Bone ca





  ** Sister(s)


Family Medical History: Coronary Artery Disease (CAD)





  ** Brother(s)


Family Medical History: Coronary Artery Disease (CAD), Myocardial Infarction (MI

)





Medications and Allergies


 Home Medications











 Medication  Instructions  Recorded  Confirmed  Type


 


Montelukast Sodium [Singulair] 10 mg PO HS 09/20/14 04/25/17 History


 


ALPRAZolam [Xanax] 0.5 mg PO HS 12/05/15 04/25/17 History


 


Apixaban [Eliquis] 2.5 mg PO BID 12/05/15 04/25/17 History


 


Metoprolol Tartrate [Lopressor] 25 mg PO BID 12/05/15 04/25/17 History


 


Nitroglycerin Sl Tabs [Nitrostat] 0.4 mg SUBLINGUAL Q5M PRN 12/05/15 04/25/17 

History


 


Atorvastatin [Lipitor] 40 mg PO HS 12/06/15 04/25/17 History


 


Ergocalciferol [Vitamin D2 50,000 unit PO ARGUETA 03/15/17 04/25/17 History





(DRISDOL)]    


 


Ipratropium-Albuterol Nebulize 3 ml INHALATION RT-QID PRN 03/15/17 04/25/17 

History





[Duoneb 0.5 mg-3 mg/3 ml Soln]    


 


Melatonin 3 mg PO HS 03/15/17 04/25/17 History


 


Multivitamins, Thera [Multivitamin 1 tab PO DAILY 03/15/17 04/25/17 History





(formulary)]    


 


Omeprazole 40 mg PO DAILY 03/15/17 04/25/17 History


 


amLODIPine [Norvasc] 10 mg PO DAILY 03/15/17 04/25/17 History


 


hydrALAZINE HCL [Apresoline] 75 mg PO BID 03/15/17 04/25/17 History


 


oxyCODONE HCL 20 mg PO TID 03/15/17 04/25/17 History


 


Aspirin 81 mg PO DAILY 04/04/17 04/25/17 History


 


Furosemide [Lasix] 40 mg PO DAILY 04/04/17 04/25/17 History


 


Losartan Potassium [Cozaar] 100 mg PO DAILY 04/04/17 04/25/17 History


 


Potassium Chloride ER [K-Dur 20] 20 meq PO DAILY 04/04/17 04/25/17 History











 Allergies











Allergy/AdvReac Type Severity Reaction Status Date / Time


 


diphenhydramine Allergy  Unknown Verified 04/25/17 13:45





[From Benadryl]     


 


furosemide [From Lasix] Allergy  Unknown Verified 04/25/17 13:45


 


latex Allergy  Rash/Hives Verified 04/25/17 13:45


 


morphine Allergy  Rash/Hives Verified 04/25/17 13:45


 


pregabalin [From Lyrica] Allergy  Unknown Verified 04/25/17 13:45


 


Sulfa (Sulfonamide Allergy  Unknown Verified 04/25/17 13:45





Antibiotics)     














Physical Exam


Vitals: 


 Vital Signs











  Temp Pulse Pulse Resp BP BP Pulse Ox


 


 04/26/17 04:00  100.2 F H   83  18   121/56  97


 


 04/26/17 00:00  99.8 F H   81  18   115/63  93 L


 


 04/25/17 20:00  98.5 F   89  18   102/46  96


 


 04/25/17 16:00  97.6 F   83  20   115/87  97


 


 04/25/17 15:32  97.6 F  91   18  119/56   95


 


 04/25/17 15:28  97.6 F   83  20   115/87  97


 


 04/25/17 15:00  97.6 F  91   18  119/56   95








 Intake and Output











 04/25/17 04/26/17 04/26/17





 22:59 06:59 14:59


 


Intake Total 496  960


 


Output Total 100  


 


Balance 396  960


 


Intake:   


 


  IV   960


 


    Sodium Chloride 0.9% 1,   960





    000 ml @ 80 mls/hr IV .   





    [id number] CORBIN Rx#:[id number]   


 


  Intake, IV Titration 160  





  Amount   


 


    Sodium Chloride 0.9% 1, 160  





    000 ml @ 80 mls/hr IV .   





    [id number] CORBIN Rx#:[id number]   


 


  Oral 336  


 


Output:   


 


  Urine 100  


 


Other:   


 


  Voiding Method Bedside Commode Bedside Commode 





  Incontinent 


 


  # Voids 1 1 0


 


  Weight 70.307 kg 65.4 kg 











PHYSICAL EXAMINATION: 





HEENT: Head is atraumatic, normocephalic.  Pupils equal, round.  Neck is 

supple.  There is  elevated jugular venous pressure.  Right carotid bruit.





HEART EXAMINATION: S1 and S2 irregularly irregular





CHEST EXAMINATION: On's reveal decreased air exchange bilaterally.





ABDOMEN:  Soft, nontender. Bowel sounds are heard. No organomegaly noted.


 


EXTREMITIES: 2+ peripheral pulses with 2+  evidence of peripheral edema and no 

calf tenderness noted.





NEUROLOGIC patient is sleepy , alert and oriented -3.


 


.


 











Results





 04/25/17 11:52





 04/25/17 11:52


 Cardiac Enzymes











  04/25/17 04/26/17 04/26/17 Range/Units





  18:54 00:06 05:46 


 


CK-MB (CK-2)  1.9  1.3  1.2  (0.0-2.4)  ng/mL


 


Troponin I  0.044 H*  0.035 H*  0.045 H*  (0.000-0.034)  ng/mL








 Current Medications











Generic Name Dose Route Start Last Admin





  Trade Name Freq  PRN Reason Stop Dose Admin


 


Acetaminophen  650 mg  04/26/17 06:47  04/26/17 07:01





  Tylenol Tab  PO   650 mg





  Q6HR PRN   Administration





  Fever and/ or Mild Pain   


 


Albuterol/Ipratropium  3 ml  04/25/17 15:03  





  Duoneb 0.5 Mg-3 Mg/3 Ml Soln  INHALATION   





  RT-QID PRN   





  Shortness Of Breath Or Wheezing   


 


Alprazolam  0.5 mg  04/25/17 21:00  04/25/17 20:56





  Xanax  PO   Not Given





  HS CORBIN   


 


Amlodipine Besylate  10 mg  04/26/17 09:00  





  Norvasc  PO   





  DAILY CORBIN   


 


Atorvastatin Calcium  40 mg  04/25/17 21:00  04/25/17 20:56





  Lipitor  PO   40 mg





  HS CORBIN   Administration


 


Ergocalciferol  50,000 unit  04/30/17 09:00  





  Vitamin D2  PO   





  Q7D CORBIN   


 


Famotidine  20 mg  04/25/17 21:00  04/25/17 20:56





  Pepcid  PO   20 mg





  BID CORBIN   Administration


 


Ferrous Sulfate  325 mg  04/26/17 12:30  





  Feosol  PO   





  W/LUNCH CORBIN   


 


Furosemide  40 mg  04/26/17 09:00  





  Lasix  PO   





  DAILY CORBIN   


 


Hydralazine HCl  75 mg  04/25/17 21:00  04/25/17 20:55





  Apresoline  PO   Not Given





  BID CORBIN   


 


Sodium Chloride  1,000 mls @ 80 mls/hr  04/25/17 15:00  04/26/17 05:21





  Saline 0.9%  IV   80 mls/hr





  .[id number] CORBIN   Administration


 


Losartan Potassium  100 mg  04/26/17 09:00  





  Cozaar  PO   





  DAILY CORBIN   


 


Melatonin  3 mg  04/25/17 21:00  04/25/17 20:56





  Melatonin  PO   Not Given





  HS CORBIN   


 


Metoprolol Tartrate  25 mg  04/25/17 21:00  04/25/17 23:20





  Lopressor  PO   25 mg





  BID CORBIN   Administration


 


Montelukast Sodium  10 mg  04/25/17 21:00  04/25/17 20:56





  Singulair  PO   10 mg





  HS CORBIN   Administration


 


Naloxone HCl  0.2 mg  04/25/17 14:58  





  Narcan  IV   





  Q2M PRN   





  Opioid Reversal   


 


Potassium Chloride  20 meq  04/26/17 09:00  





  K-Dur 20  PO   





  DAILY CORBIN   


 


Sucralfate  1 gm  04/25/17 21:00  04/25/17 20:56





  Carafate  PO   1 gm





  BID CORBIN   Administration








 Intake and Output











 04/25/17 04/26/17 04/26/17





 22:59 06:59 14:59


 


Intake Total 496  960


 


Output Total 100  


 


Balance 396  960


 


Intake:   


 


  IV   960


 


    Sodium Chloride 0.9% 1,   960





    000 ml @ 80 mls/hr IV .   





    [id number] CORBIN Rx#:[id number]   


 


  Intake, IV Titration 160  





  Amount   


 


    Sodium Chloride 0.9% 1, 160  





    000 ml @ 80 mls/hr IV .   





    [id number] CORBIN Rx#:[id number]   


 


  Oral 336  


 


Output:   


 


  Urine 100  


 


Other:   


 


  Voiding Method Bedside Commode Bedside Commode 





  Incontinent 


 


  # Voids 1 1 0


 


  Weight 70.307 kg 65.4 kg 














EKG Interpretations (text)





EKG shows atrial fibrillation with a controlled ventricular response





Assessment and Plan


Plan: 


Assessment and plan


#1 symptoms of weakness and tiredness with associated fever.  Recent admission 

with left upper lobe pneumonia


#2 questionable GI bleed.  Patient's  bowel movements have been  normal in color

, she did notice some blood on the tissue.  Stool focal black negative.  

Hemoglobin 9.9.


#3 recent hospital admission with a GI bleed requiring blood transfusion


#4 chronic persistent atrial fibrillation on Eliquis


# 5 known history of coronary artery disease with prior stenting of the LAD


#6 hypertension


#7 hyperlipidemia


#8 PAD with prior carotid endarterectomy


#9 prior history of stroke


#10 diastolic congestive heart failure acute on chronic, most recent 

echocardiogram with Doppler study was performed in March which revealed an 

ejection fraction of 45-50%, moderate to severe pulmonary hypertension.


#11 abnormal troponins, not consistent with acute coronary syndrome


#12 acute on chronic renal failure


#13 positive UTI








Plan


We will recommend to resume the patient's Eliquis at 2-1/2 mg one tablet by 

mouth twice a day along with the baby aspirin.  We will also give the patient 2 

doses of IV Lasix.  Then resume her 40 mg daily.  We will hold the patient's 

Norvasc this morning because of blood pressure of 100 systolic.  Check daily 

lytes BUN and creatinine as well as daily CBCs.  Obtain BNP level .We will 

continue to follow.








DNP note has been reviewed, I agree with a documented findings and plan of 

care.  Patient was seen and examined.

## 2017-04-26 NOTE — P.HPIM
History of Present Illness


H&P Date: 04/25/17


Chief Complaint: sepsis/encephalopathy/UTI





This is a 77-year-old  female one of my patient with a previous 

medical history significant for CAD post-PCI of the LAD, hypertension and 

hypertensive cardio vascular disease with left ventricular hypertrophy, 

hyperlipidemia, history of the for arterial occlusive disease, carotid artery 

disease status post right carotid endarterectomy with about 70% stenosis of 

bilateral carotid arteries, has been under the care of cardiology, pulmonary 

medicine, vascular surgery, gastroenterology, she was recently hospitalized at 

[facility] in 03/16/2017 after she was admitted for acute 

respiratory failure secondary to acute diastolic heart failure and was seen and 

evaluated by pulmonary and cardiology along with gastroenterology at that time 

she was given 2 units of packed red blood cells and the patient was sent home , 

subsequently she followed with me as an outpatient, patient developed to have a 

significant anemia with significant GI bleed at that time she was sent to the 

ER in 04/08/2017 she was treated there and the patient was sent home however he 

was recommended for the patient to go back and her Eliquis despite the fact 

that she has been anemic and has been having some blood loss with recurrent 

transfusion, patient was very weak to day after she had a bowel movement and 

stated that there is blood in stool ,so her son Ivan brought her to the 

emergency department at [facility] today with mental status changes 

she was found to have a urinary tract infection as well as encephalopathy at 

the same time stated that she has been taking her Eliquis at this time on the 

recommendations of her cardiologist, patient did have a hemoglobin of 9.9, she 

did have a urinary tract infection and she shows some signs of encephalopathy 

thought to be due to pain management.





Review of Systems


Constitutional: Reports anorexia, Reports chronic headaches, Reports chronic 

pain, Reports lethargy, Reports malaise, Reports weakness


Eyes: bilateral blurred vision, bilateral decreased vision, denies bulging eye


Ears: bilateral: decreased hearing


Ears, nose, mouth and throat: Denies dysphagia, Denies neck lump, Denies 

swelling in throat


Cardiovascular: Reports chest pain, Reports dyspnea on exertion, Reports 

lightheadedness, Reports orthopnea, Reports shortness of breath, Denies syncope


Respiratory: Reports cough, Reports cough with sputum, Reports home oxygen, 

Reports wheezing, Denies congestion, Denies sleep apnea, Denies snoring


Gastrointestinal: Reports abdominal pain, Reports BRBPR, Reports change in 

bowel habits, Reports constipation, Reports heartburn, Reports hematemesis, 

Reports hematochezia, Reports melena, Reports nausea, Denies bloating, Denies 

vomiting


Genitourinary: Reports dysuria, Reports urgency


Menstruation: Reports postmenopausal


Musculoskeletal: Reports gait dysfunction, Reports low back pain, Reports 

shooting leg pain


Musculoskeletal: bilateral: ankle swelling, foot pain, foot stiffness, foot 

swelling, hip pain, hip stiffness, absent: ankle pain, ankle stiffness, elbow 

pain, elbow stiffness, elbow swelling, hand pain, hand stiffness, hand swelling

, hip swelling, knee pain, knee stiffness, knee swelling, shoulder pain, 

shoulder stiffness, shoulder swelling, wrist pain, wrist stiffness, wrist 

swelling


Integumentary: Denies pruritus, Denies rash


Neurological: Denies numbness, Denies weakness


Psychiatric: Reports anxiety, Reports change in sleep habits, Reports insomnia, 

Reports memory loss, Reports sleep disturbances


Endocrine: Denies fatigue, Denies weight change





Past Medical History


Past Medical History: Atrial Fibrillation, Asthma, Coronary Artery Disease (CAD)

, COPD, CVA/TIA, GERD/Reflux, GI Bleed, Hyperlipidemia, Hypertension, Mitral 

Valve Prolapse (MVP), Musculoskeletal Disorder, Osteoarthritis (OA), 

Respiratory Disorder, Vascular Disorder


Additional Past Medical History / Comment(s): diverticulitits


Last Myocardial Infarction Date:: unknown


History of Any Multi-Drug Resistant Organisms: None Reported


Past Surgical History: Cholecystectomy, Heart Catheterization With Stent, 

Hysterectomy, Orthopedic Surgery


Additional Past Surgical History / Comment(s): carotid, back sx


Past Anesthesia/Blood Transfusion Reactions: No Reported Reaction


Date of Last Stent Placement:: 2011


Past Psychological History: No Psychological Hx Reported


Smoking Status: Current some day smoker


Past Alcohol Use History: None Reported


Past Drug Use History: None Reported





- Past Family History


  ** Son(s)


Family Medical History: No Reported History





  ** Daughter(s)


Family Medical History: No Reported History





  ** Mother


Family Medical History: Congestive Heart Failure (CHF)





  ** Father


Family Medical History: Cancer


Additional Family Medical History / Comment(s): Bone ca





  ** Sister(s)


Family Medical History: Coronary Artery Disease (CAD)





  ** Brother(s)


Family Medical History: Coronary Artery Disease (CAD), Myocardial Infarction (MI

)





Medications and Allergies


 Home Medications











 Medication  Instructions  Recorded  Confirmed  Type


 


Montelukast Sodium [Singulair] 10 mg PO HS 09/20/14 04/25/17 History


 


ALPRAZolam [Xanax] 0.5 mg PO HS 12/05/15 04/25/17 History


 


Apixaban [Eliquis] 2.5 mg PO BID 12/05/15 04/25/17 History


 


Metoprolol Tartrate [Lopressor] 25 mg PO BID 12/05/15 04/25/17 History


 


Nitroglycerin Sl Tabs [Nitrostat] 0.4 mg SUBLINGUAL Q5M PRN 12/05/15 04/25/17 

History


 


Atorvastatin [Lipitor] 40 mg PO HS 12/06/15 04/25/17 History


 


Ergocalciferol [Vitamin D2 50,000 unit PO ARGUETA 03/15/17 04/25/17 History





(DRISDOL)]    


 


Ipratropium-Albuterol Nebulize 3 ml INHALATION RT-QID PRN 03/15/17 04/25/17 

History





[Duoneb 0.5 mg-3 mg/3 ml Soln]    


 


Melatonin 3 mg PO HS 03/15/17 04/25/17 History


 


Multivitamins, Thera [Multivitamin 1 tab PO DAILY 03/15/17 04/25/17 History





(formulary)]    


 


Omeprazole 40 mg PO DAILY 03/15/17 04/25/17 History


 


amLODIPine [Norvasc] 10 mg PO DAILY 03/15/17 04/25/17 History


 


hydrALAZINE HCL [Apresoline] 75 mg PO BID 03/15/17 04/25/17 History


 


oxyCODONE HCL 20 mg PO TID 03/15/17 04/25/17 History


 


Aspirin 81 mg PO DAILY 04/04/17 04/25/17 History


 


Furosemide [Lasix] 40 mg PO DAILY 04/04/17 04/25/17 History


 


Losartan Potassium [Cozaar] 100 mg PO DAILY 04/04/17 04/25/17 History


 


Potassium Chloride ER [K-Dur 20] 20 meq PO DAILY 04/04/17 04/25/17 History











 Allergies











Allergy/AdvReac Type Severity Reaction Status Date / Time


 


diphenhydramine Allergy  Unknown Verified 04/25/17 13:45





[From Benadryl]     


 


morphine Allergy  Rash/Hives Verified 04/25/17 13:45


 


pregabalin [From Lyrica] Allergy  Unknown Verified 04/25/17 13:45


 


Sulfa (Sulfonamide Allergy  Unknown Verified 04/25/17 13:45





Antibiotics)     














Physical Exam


Vitals: 


 Vital Signs











  Temp Pulse Pulse Resp BP BP Pulse Ox


 


 04/25/17 16:00  97.6 F   83  20   115/87  97


 


 04/25/17 15:32  97.6 F  91   18  119/56   95


 


 04/25/17 15:28  97.6 F   83  20   115/87  97


 


 04/25/17 15:00  97.6 F  91   18  119/56   95








 Intake and Output











 04/25/17 04/25/17 04/25/17





 06:59 14:59 22:59


 


Intake Total   160


 


Balance   160


 


Intake:   


 


  Intake, IV Titration   160





  Amount   


 


    Sodium Chloride 0.9% 1,   160





    000 ml @ 80 mls/hr IV .   





    [id number] [facility] Rx#:[id number]   


 


Other:   


 


  Voiding Method   Bedside Commode


 


  Weight   70.307 kg








 Patient Weight











 04/26/17





 06:59


 


Weight 70.307 kg














- Constitutional


General appearance: average body habitus, mild distress





- EENT


Eyes: anicteric sclerae, EOMI, PERRLA, no ptosis, no scleral icterus, normal 

appearance


ENT: hard of hearing, normal oropharynx, no thrush


Ears: bilateral: normal





- Neck


Neck: no lymphadenopathy, normal ROM, no rigidity, no stridor, no thyromegaly


Carotids: bilateral: upstroke delayed, bruit present


Thyroid: bilateral: normal size





- Respiratory


Respiratory: bilateral: diminished, rhonchi, wheezing, prolonged expiration, 

negative: dullness, rales





- Cardiovascular


Rhythm: regular


Heart sounds: normal: S1, S2


Abnormal Heart Sounds: systolic murmur





- Gastrointestinal


General gastrointestinal: normal bowel sounds, soft, no tenderness, no 

umbilical hernia, no ventral hernia





- Integumentary


Integumentary: normal, normal turgor





- Neurologic


Neurologic: CNII-XII intact





- Musculoskeletal


Musculoskeletal: generalized weakness, strength equal bilaterally





- Psychiatric


Psychiatric: A&O x's 3, appropriate affect, intact judgment & insight





Results


CBC & Chem 7: 


 04/25/17 11:52





 04/25/17 11:52





Thrombosis Risk Factor Assmnt





- DVT/VTE Prophylaxis


DVT/VTE Prophylaxis: Pharmacologic Prophylaxis ordered, Mechanical Prophylaxis 

ordered





- Choose All That Apply


Each Factor Represents 1 point: Abnormal pulmonary function (COPD), Obesity (

BMI >25), Swollen legs (current)


Other Risk Factors: Yes


Each Risk Factor Represents 2 Points: Age 61-74 years


Other congenital or acquired thrombophilia - If yes, enter type in comment: No


Thrombosis Risk Factor Assessment Total Risk Factor Score: 5


Thrombosis Risk Factor Assessment Level: High Risk





Assessment and Plan


Plan: 





Assessment and plan:





1.  Metabolic encephalopathy due to pain management and possible UTI with 

sepsis.  We will continue to monitor the patient very closely, urine culture, 

blood culture, start the patient on IV antibiotic in the form of levaquin 500 

mg IVPB q 48 h,urine culture was sent.





2.  Acute/chronic blood loss anemia secondary to possible GI bleed as well as 

hemoptysis.  Hemoglobin stable at 9.9.





3. CAD post-PCI of the LAD.  Continue metoprolol 25 mg orally twice every day, 

Lipitor 40 mg orally once every day, losartan 50 mg orally once every day, and 

metoprolol 25 mg orally twice every day.





4.  History of CVA of the left frontal lobe.resume Eliquis 2.5 mg po bid and 

monitor HGB in the hospital.





5.  Carotid artery disease as well as PAD.  Currently under the care of 

vascular surgery continue Lipitor 40 mg orally once every day for secondary 

prevention.





6.  Hypertension and hypertensive cardiovascular disease.  Continue losartan 50 

mg orally once every day, metoprolol 25 mg orally twice every day, hydralazine 

75 mg orally 2 times every day.





7.  Hyperlipidemia.  Continue Lipitor 40 mg orally once every day.





8.  ALLERGIC rhinitis.  Continue singular 10 mg at bedtime.





9.  Lumbar degenerative disc disease with spondylolisthesis and facet 

arthropathy.  Post epidural injection.





9. Chronic atrial fibrillation.will continue metoprolol 25 mg orally twice 

every day and restart Eliquis .





10.  DVT prophylaxis.  Patient will be on Eliquis for now she will have knee-

high KEVIN hose as well as SCD's.





11.  GI prophylaxis.  Continue Protonix 40 mg orally once every day along with 

Carafate 1 g orally twice every day.





12.  Patient is full code.





13.  Admit to inpatient.  Estimate length of stay 2 midnights.





14.  Prognosis is guarded.

## 2017-04-27 LAB
ALP SERPL-CCNC: 78 U/L (ref 38–126)
ALT SERPL-CCNC: 41 U/L (ref 9–52)
ANION GAP SERPL CALC-SCNC: 4 MMOL/L
AST SERPL-CCNC: 22 U/L (ref 14–36)
BUN SERPL-SCNC: 36 MG/DL (ref 7–17)
CALCIUM SPEC-MCNC: 7.7 MG/DL (ref 8.4–10.2)
CELLS COUNTED: 100
CH: 24.3
CH: 24.5
CHCM: 29.3
CHCM: 29.4
CHLORIDE SERPL-SCNC: 101 MMOL/L (ref 98–107)
CO2 SERPL-SCNC: 34 MMOL/L (ref 22–30)
ERYTHROCYTE [DISTWIDTH] IN BLOOD BY AUTOMATED COUNT: 3.34 M/UL (ref 3.8–5.4)
ERYTHROCYTE [DISTWIDTH] IN BLOOD BY AUTOMATED COUNT: 3.48 M/UL (ref 3.8–5.4)
ERYTHROCYTE [DISTWIDTH] IN BLOOD: 19.2 % (ref 11.5–15.5)
ERYTHROCYTE [DISTWIDTH] IN BLOOD: 19.4 % (ref 11.5–15.5)
GLUCOSE BLD-MCNC: 89 MG/DL (ref 75–99)
GLUCOSE SERPL-MCNC: 76 MG/DL (ref 74–99)
HCT VFR BLD AUTO: 27.6 % (ref 34–46)
HCT VFR BLD AUTO: 29 % (ref 34–46)
HDW: 3.57
HDW: 3.58
HGB BLD-MCNC: 8.2 GM/DL (ref 11.4–16)
HGB BLD-MCNC: 8.7 GM/DL (ref 11.4–16)
MAGNESIUM SPEC-SCNC: 1.8 MG/DL (ref 1.6–2.3)
MCH RBC QN AUTO: 24.7 PG (ref 25–35)
MCH RBC QN AUTO: 24.9 PG (ref 25–35)
MCHC RBC AUTO-ENTMCNC: 29.9 G/DL (ref 31–37)
MCHC RBC AUTO-ENTMCNC: 29.9 G/DL (ref 31–37)
MCV RBC AUTO: 82.5 FL (ref 80–100)
MCV RBC AUTO: 83.5 FL (ref 80–100)
NON-AFRICAN AMERICAN GFR(MDRD): 48
POTASSIUM SERPL-SCNC: 4 MMOL/L (ref 3.5–5.1)
PROT SERPL-MCNC: 4.5 G/DL (ref 6.3–8.2)
SODIUM SERPL-SCNC: 139 MMOL/L (ref 137–145)
WBC # BLD AUTO: 7.4 K/UL (ref 3.8–10.6)
WBC # BLD AUTO: 8.9 K/UL (ref 3.8–10.6)
WBC (PEROX): 7.81

## 2017-04-27 RX ADMIN — POTASSIUM CHLORIDE SCH MEQ: 20 TABLET, EXTENDED RELEASE ORAL at 09:23

## 2017-04-27 RX ADMIN — Medication SCH: at 22:20

## 2017-04-27 RX ADMIN — METOPROLOL TARTRATE SCH MG: 25 TABLET, FILM COATED ORAL at 09:23

## 2017-04-27 RX ADMIN — SUCRALFATE SCH GM: 1 TABLET ORAL at 21:18

## 2017-04-27 RX ADMIN — MONTELUKAST SODIUM SCH MG: 10 TABLET, FILM COATED ORAL at 21:19

## 2017-04-27 RX ADMIN — ATORVASTATIN CALCIUM SCH MG: 40 TABLET, FILM COATED ORAL at 21:18

## 2017-04-27 RX ADMIN — METOPROLOL TARTRATE SCH MG: 25 TABLET, FILM COATED ORAL at 21:19

## 2017-04-27 RX ADMIN — Medication SCH MG: at 12:09

## 2017-04-27 RX ADMIN — SUCRALFATE SCH GM: 1 TABLET ORAL at 09:22

## 2017-04-27 RX ADMIN — FAMOTIDINE SCH MG: 20 TABLET, FILM COATED ORAL at 09:23

## 2017-04-27 RX ADMIN — IPRATROPIUM BROMIDE AND ALBUTEROL SULFATE PRN ML: .5; 3 SOLUTION RESPIRATORY (INHALATION) at 09:00

## 2017-04-27 RX ADMIN — LOSARTAN POTASSIUM SCH MG: 50 TABLET, FILM COATED ORAL at 12:09

## 2017-04-27 NOTE — P.PN
Subjective


This is a 77-year-old  female one of my patient with a previous 

medical history significant for CAD post-PCI of the LAD, hypertension and 

hypertensive cardio vascular disease with left ventricular hypertrophy, 

hyperlipidemia, history of the for arterial occlusive disease, carotid artery 

disease status post right carotid endarterectomy with about 70% stenosis of 

bilateral carotid arteries, has been under the care of cardiology, pulmonary 

medicine, vascular surgery, gastroenterology, she was recently hospitalized at 

[facility] in 03/16/2017 after she was admitted for acute 

respiratory failure secondary to acute diastolic heart failure and was seen and 

evaluated by pulmonary and cardiology along with gastroenterology at that time 

she was given 2 units of packed red blood cells and the patient was sent home , 

subsequently she followed with me as an outpatient, patient developed to have a 

significant anemia with significant GI bleed at that time she was sent to the 

ER in 04/08/2017 she was treated there and the patient was sent home however he 

was recommended for the patient to go back and her Eliquis despite the fact 

that she has been anemic and has been having some blood loss with recurrent 

transfusion, patient was very weak to day after she had a bowel movement and 

stated that there is blood in stool ,so her son Ivan brought her to the 

emergency department at [facility] today with mental status changes 

she was found to have a urinary tract infection as well as encephalopathy at 

the same time stated that she has been taking her Eliquis at this time on the 

recommendations of her cardiologist, patient did have a hemoglobin of 9.9, she 

did have a urinary tract infection and she shows some signs of encephalopathy 

thought to be due to pain medication.





4/26: Patient has been seen by cardiology with recommendations to resume 

eliquis and aspirin, 2 doses of IV Lasix and change back to oral in the morning 

at her home dose.  Troponins have been 0.044, 0.035 and 0.045.  ProBNP was 3540.





4/27: Patient's oxycodone was resumed at twice daily versus 3 times daily.  

Hemoglobin has dropped to 8.2.  She has not had a bowel movement.  We'll plan 

to repeat hemoglobin tonight at 6 PM.  Platelet count is dropped also to 87.  

Her culture is showing greater than 100,000 colonies of gram-negative bacilli.  

Patient is on Levaquin.  PT and OT consults have been added.





Objective





- Vital Signs


Vital signs: 


 Vital Signs











Temp  98.1 F   04/27/17 03:49


 


Pulse  90   04/27/17 09:00


 


Resp  18   04/27/17 03:49


 


BP  112/56   04/27/17 03:49


 


Pulse Ox  94 L  04/27/17 03:49








 Intake & Output











 04/26/17 04/27/17 04/27/17





 18:59 06:59 18:59


 


Intake Total   236


 


Balance   236


 


Intake:   


 


  Oral   236














- Exam





General appearance: average body habitus, mild distress





- EENT


Eyes: anicteric sclerae, EOMI, PERRLA, no ptosis, no scleral icterus, normal 

appearance


ENT: hard of hearing, normal oropharynx, no thrush


Ears: bilateral: normal





- Neck


Neck: no lymphadenopathy, normal ROM, no rigidity, no stridor, no thyromegaly


Carotids: bilateral: upstroke delayed, bruit present


Thyroid: bilateral: normal size





- Respiratory


Respiratory: bilateral: diminished, rhonchi, wheezing, prolonged expiration, 

negative: dullness, rales





- Cardiovascular


Rhythm: regular


Heart sounds: normal: S1, S2


Abnormal Heart Sounds: systolic murmur





- Gastrointestinal


General gastrointestinal: normal bowel sounds, soft, no tenderness, no 

umbilical hernia, no ventral hernia





- Integumentary


Integumentary: normal, normal turgor





- Neurologic


Neurologic: CNII-XII intact





- Musculoskeletal


Musculoskeletal: generalized weakness, strength equal bilaterally





- Psychiatric


Psychiatric: A&O x's 3, appropriate affect, intact judgment & insight





- Labs


CBC & Chem 7: 


 04/27/17 05:36





 04/27/17 05:36





Assessment and Plan


Plan: 





1.  Metabolic encephalopathy due to pain management and possible UTI with 

sepsis.  We will continue to monitor the patient very closely, urine culture, 

blood culture, start the patient on IV antibiotic in the form of levaquin 500 

mg IVPB q 48 h,urine culture was sent.





2.  Acute/chronic blood loss anemia secondary to possible GI bleed as well as 

hemoptysis.  Hemoglobin stable at 9.9.





3. CAD post-PCI of the LAD.  Continue metoprolol 25 mg orally twice every day, 

Lipitor 40 mg orally once every day, losartan 50 mg orally once every day, and 

metoprolol 25 mg orally twice every day.





4.  History of CVA of the left frontal lobe.resume Eliquis 2.5 mg po bid and 

monitor HGB in the hospital.





5.  Carotid artery disease as well as PAD.  Currently under the care of 

vascular surgery continue Lipitor 40 mg orally once every day for secondary 

prevention.





6.  Hypertension and hypertensive cardiovascular disease.  Continue losartan 50 

mg orally once every day, metoprolol 25 mg orally twice every day, hydralazine 

75 mg orally 2 times every day.





7.  Hyperlipidemia.  Continue Lipitor 40 mg orally once every day.





8.  ALLERGIC rhinitis.  Continue singular 10 mg at bedtime.





9.  Lumbar degenerative disc disease with spondylolisthesis and facet 

arthropathy.  Post epidural injection.





9. Chronic atrial fibrillation.will continue metoprolol 25 mg orally twice 

every day and restart Eliquis .





10.  DVT prophylaxis.  Patient will be on Eliquis for now she will have knee-

high KEVIN hose as well as SCD's.





11.  GI prophylaxis.  Continue Protonix 40 mg orally once every day along with 

Carafate 1 g orally twice every day.





12.  Patient is full code.





13.  Admit to inpatient.  Estimate length of stay 2 midnights.





14.  Prognosis is guarded.





Discharge plan: Return home 





Impression and plan of care have been directed as dictated by the signing 

physician.  Danielle Herbert nurse practitioner acting as scribe for signing 

physician.


Time with Patient: Greater than 30

## 2017-04-27 NOTE — P.PN
Subjective





This is a pleasant 71-year-old  female with known history of coronary 

artery disease and prior LAD stenting, hypertension, hyperlipidemia, chronic 

renal failure,prior CVA, PAD with prior carotid endarterectomy, chronic 

persistent atrial fibrillation, on Eliquis, who was recently in the hospital on 

the fourth of this month with acute respiratory failure secondary to hemoptysis 

and left upper lobe pneumonia as well as exacerbation of COPD.  Patient at that 

time also had acute blood loss anemia and was transfused with 2 units of packed 

red blood cells.  She was discharged home from the hospital on that occasion on 

Eliquis 2-1/2 mg one tablet by mouth twice a day, and a baby aspirin daily.  

She presents to the hospital with recurrent symptoms of extreme weakness, she 

also states that when she had a bowel movement, she noticed some blood on the 

tissue.  She does state that her bowel movement was normal in color.  EKG shows 

atrial fibrillation with a controlled ventricular response.  Chest x-ray shows 

improving interstitial changes suggestive of resolving congestion and areas of 

infiltrate.  Abdominal x-ray negative.  CAT scan of the brain revealed age-

related atrophic and chronic small vessel ischemic change without acute 

intracranial process.  She was resumed back on her Eliquis yesterday, 

hemoglobin yesterday 9.9, today 8.2.  Denies any shortness of breath today.  

Overall feeling well.  Creatinine 1.1.  No bowel movements.  Urine culture 

reveals greater than 100,000 colonies of gram-negative bacilli.





Objective





- Vital Signs


Vital signs: 


 Vital Signs











Temp  98.6 F   04/27/17 09:30


 


Pulse  93   04/27/17 09:30


 


Resp  18   04/27/17 09:30


 


BP  145/65   04/27/17 09:30


 


Pulse Ox  93 L  04/27/17 09:30








 Intake & Output











 04/26/17 04/27/17 04/27/17





 18:59 06:59 18:59


 


Intake Total   236


 


Balance   236


 


Weight   65.9 kg


 


Intake:   


 


  Oral   236


 


Other:   


 


  Voiding Method   Toilet














- Exam





PHYSICAL EXAMINATION: 





HEENT: Head is atraumatic, normocephalic.  Pupils equal, round.  Neck is 

supple.  There is  elevated jugular venous pressure.  Right carotid bruit.





HEART EXAMINATION: S1 and S2 irregularly irregular





CHEST EXAMINATION: On's reveal decreased air exchange bilaterally.





ABDOMEN:  Soft, nontender. Bowel sounds are heard. No organomegaly noted.


 


EXTREMITIES: 2+ peripheral pulses with 2+  evidence of peripheral edema and no 

calf tenderness noted.





NEUROLOGIC patient is sleepy , alert and oriented -3.


 





- Labs


CBC & Chem 7: 


 04/27/17 05:36





 04/27/17 05:36





Assessment and Plan


Plan: 


Assessment and plan


#1 symptoms of weakness and tiredness with associated fever.  Recent admission 

with left upper lobe pneumonia


#2 questionable GI bleed.  Patient's  bowel movements have been  normal in color

, she did notice some blood on the tissue.  Stool ffor occult blood negative.  

Hemoglobin 9.9 and admission 8.2 this morning.


#3 recent hospital admission with a GI bleed requiring blood transfusion


#4 chronic persistent atrial fibrillation on Eliquis


# 5 known history of coronary artery disease with prior stenting of the LAD


#6 hypertension


#7 hyperlipidemia


#8 PAD with prior carotid endarterectomy


#9 prior history of stroke


#10 diastolic congestive heart failure acute on chronic, most recent 

echocardiogram with Doppler study was performed in March which revealed an 

ejection fraction of 45-50%, moderate to severe pulmonary hypertension.


#11 abnormal troponins, not consistent with acute coronary syndrome


#12 acute on chronic renal failure


#13 positive UTI








Plan


Repeat  hemoglobin has been requested for 6 PM tonight.  At this time we will 

continue the Eliquis along with the baby aspirin, recommend reevaluation from 

GI service.


DNP note has been reviewed, I agree with a documented findings and plan of 

care.  Patient was seen and examined.

## 2017-04-28 LAB
ALP SERPL-CCNC: 84 U/L (ref 38–126)
ALT SERPL-CCNC: 39 U/L (ref 9–52)
ANION GAP SERPL CALC-SCNC: 2 MMOL/L
AST SERPL-CCNC: 20 U/L (ref 14–36)
BUN SERPL-SCNC: 34 MG/DL (ref 7–17)
CALCIUM SPEC-MCNC: 8 MG/DL (ref 8.4–10.2)
CH: 24.3
CHCM: 29.4
CHLORIDE SERPL-SCNC: 105 MMOL/L (ref 98–107)
CO2 SERPL-SCNC: 33 MMOL/L (ref 22–30)
ERYTHROCYTE [DISTWIDTH] IN BLOOD BY AUTOMATED COUNT: 3.13 M/UL (ref 3.8–5.4)
ERYTHROCYTE [DISTWIDTH] IN BLOOD: 19.4 % (ref 11.5–15.5)
GLUCOSE SERPL-MCNC: 82 MG/DL (ref 74–99)
HCT VFR BLD AUTO: 25.8 % (ref 34–46)
HDW: 3.53
HGB BLD-MCNC: 7.7 GM/DL (ref 11.4–16)
MCH RBC QN AUTO: 24.5 PG (ref 25–35)
MCHC RBC AUTO-ENTMCNC: 29.7 G/DL (ref 31–37)
MCV RBC AUTO: 82.4 FL (ref 80–100)
NON-AFRICAN AMERICAN GFR(MDRD): 49
POTASSIUM SERPL-SCNC: 4 MMOL/L (ref 3.5–5.1)
PROT SERPL-MCNC: 4.8 G/DL (ref 6.3–8.2)
SODIUM SERPL-SCNC: 140 MMOL/L (ref 137–145)
WBC # BLD AUTO: 6.7 K/UL (ref 3.8–10.6)

## 2017-04-28 RX ADMIN — ATORVASTATIN CALCIUM SCH MG: 40 TABLET, FILM COATED ORAL at 20:53

## 2017-04-28 RX ADMIN — ACETAMINOPHEN PRN MG: 325 TABLET, FILM COATED ORAL at 16:58

## 2017-04-28 RX ADMIN — IPRATROPIUM BROMIDE AND ALBUTEROL SULFATE PRN ML: .5; 3 SOLUTION RESPIRATORY (INHALATION) at 08:27

## 2017-04-28 RX ADMIN — SUCRALFATE SCH GM: 1 TABLET ORAL at 07:54

## 2017-04-28 RX ADMIN — FAMOTIDINE SCH MG: 20 TABLET, FILM COATED ORAL at 07:53

## 2017-04-28 RX ADMIN — METOPROLOL TARTRATE SCH MG: 25 TABLET, FILM COATED ORAL at 20:53

## 2017-04-28 RX ADMIN — FUROSEMIDE SCH MG: 40 TABLET ORAL at 07:53

## 2017-04-28 RX ADMIN — METOPROLOL TARTRATE SCH MG: 25 TABLET, FILM COATED ORAL at 07:53

## 2017-04-28 RX ADMIN — MONTELUKAST SODIUM SCH MG: 10 TABLET, FILM COATED ORAL at 20:52

## 2017-04-28 RX ADMIN — LOSARTAN POTASSIUM SCH MG: 50 TABLET, FILM COATED ORAL at 13:25

## 2017-04-28 RX ADMIN — IPRATROPIUM BROMIDE AND ALBUTEROL SULFATE PRN ML: .5; 3 SOLUTION RESPIRATORY (INHALATION) at 15:12

## 2017-04-28 RX ADMIN — SUCRALFATE SCH GM: 1 TABLET ORAL at 20:52

## 2017-04-28 RX ADMIN — LEVOFLOXACIN SCH MLS/HR: 5 INJECTION, SOLUTION INTRAVENOUS at 13:25

## 2017-04-28 RX ADMIN — IPRATROPIUM BROMIDE AND ALBUTEROL SULFATE PRN ML: .5; 3 SOLUTION RESPIRATORY (INHALATION) at 12:00

## 2017-04-28 RX ADMIN — Medication SCH MG: at 13:25

## 2017-04-28 RX ADMIN — Medication SCH MG: at 20:52

## 2017-04-28 RX ADMIN — POTASSIUM CHLORIDE SCH MEQ: 20 TABLET, EXTENDED RELEASE ORAL at 07:54

## 2017-04-28 NOTE — P.PN
Subjective


This is a 77-year-old  female one of my patient with a previous 

medical history significant for CAD post-PCI of the LAD, hypertension and 

hypertensive cardio vascular disease with left ventricular hypertrophy, 

hyperlipidemia, history of the for arterial occlusive disease, carotid artery 

disease status post right carotid endarterectomy with about 70% stenosis of 

bilateral carotid arteries, has been under the care of cardiology, pulmonary 

medicine, vascular surgery, gastroenterology, she was recently hospitalized at 

[facility] in 03/16/2017 after she was admitted for acute 

respiratory failure secondary to acute diastolic heart failure and was seen and 

evaluated by pulmonary and cardiology along with gastroenterology at that time 

she was given 2 units of packed red blood cells and the patient was sent home , 

subsequently she followed with me as an outpatient, patient developed to have a 

significant anemia with significant GI bleed at that time she was sent to the 

ER in 04/08/2017 she was treated there and the patient was sent home however he 

was recommended for the patient to go back and her Eliquis despite the fact 

that she has been anemic and has been having some blood loss with recurrent 

transfusion, patient was very weak to day after she had a bowel movement and 

stated that there is blood in stool ,so her son Ivan brought her to the 

emergency department at [facility] today with mental status changes 

she was found to have a urinary tract infection as well as encephalopathy at 

the same time stated that she has been taking her Eliquis at this time on the 

recommendations of her cardiologist, patient did have a hemoglobin of 9.9, she 

did have a urinary tract infection and she shows some signs of encephalopathy 

thought to be due to pain medication.





4/26: Patient has been seen by cardiology with recommendations to resume 

eliquis and aspirin, 2 doses of IV Lasix and change back to oral in the morning 

at her home dose.  Troponins have been 0.044, 0.035 and 0.045.  ProBNP was 3540.





4/27: Patient's oxycodone was resumed at twice daily versus 3 times daily.  

Hemoglobin has dropped to 8.2.  She has not had a bowel movement.  We'll plan 

to repeat hemoglobin tonight at 6 PM.  Platelet count is dropped also to 87.  

Her culture is showing greater than 100,000 colonies of gram-negative bacilli.  

Patient is on Levaquin.  PT and OT consults have been added.





4/28: Hemoglobin is now down to 7.7.  Patient states she has not had a bowel 

movement.  She denies any shortness of breath.  She has been off aspirin and 

eliquis.  She does have some generalized upper abdominal tenderness.  Awaiting 

consult from Dr. Clarke.  Patient may require transfer to [facility] 

for further evaluation.  Patient's nurse states that she was confused during 

the night but currently at baseline, alert and oriented 3.





Objective





- Vital Signs


Vital signs: 


 Vital Signs











Temp  98.4 F   04/28/17 07:37


 


Pulse  92   04/28/17 08:37


 


Resp  17   04/28/17 07:37


 


BP  137/63   04/28/17 07:37


 


Pulse Ox  96   04/28/17 07:37








 Intake & Output











 04/27/17 04/28/17 04/28/17





 18:59 06:59 18:59


 


Intake Total 1184 240 


 


Balance 1184 240 


 


Weight 65.9 kg 67.5 kg 


 


Intake:   


 


  Oral 1184 240 


 


Other:   


 


  Voiding Method Toilet Toilet 


 


  # Voids 1 2 


 


  # Bowel Movements 0  














- Exam





General appearance: average body habitus, mild distress





- EENT


Eyes: anicteric sclerae, EOMI, PERRLA, no ptosis, no scleral icterus, normal 

appearance


ENT: hard of hearing, normal oropharynx, no thrush


Ears: bilateral: normal





- Neck


Neck: no lymphadenopathy, normal ROM, no rigidity, no stridor, no thyromegaly


Carotids: bilateral: upstroke delayed, bruit present


Thyroid: bilateral: normal size





- Respiratory


Respiratory: bilateral: diminished, rhonchi, wheezing, prolonged expiration, 

negative: dullness, rales





- Cardiovascular


Rhythm: regular


Heart sounds: normal: S1, S2


Abnormal Heart Sounds: systolic murmur





- Gastrointestinal


General gastrointestinal: normal bowel sounds, soft, no tenderness, no 

umbilical hernia, no ventral hernia





- Integumentary


Integumentary: normal, normal turgor





- Neurologic


Neurologic: CNII-XII intact





- Musculoskeletal


Musculoskeletal: generalized weakness, strength equal bilaterally





- Psychiatric


Psychiatric: A&O x's 3, appropriate affect, intact judgment & insight





- Labs


CBC & Chem 7: 


 04/28/17 07:01





 04/28/17 06:58


Labs: 


 Abnormal Lab Results - Last 24 Hours (Table)











  04/27/17 04/28/17 04/28/17 Range/Units





  17:45 06:58 07:01 


 


RBC  3.48 L   3.13 L  (3.80-5.40)  m/uL


 


Hgb  8.7 L   7.7 L  (11.4-16.0)  gm/dL


 


Hct  29.0 L   25.8 L  (34.0-46.0)  %


 


MCH  24.9 L   24.5 L  (25.0-35.0)  pg


 


MCHC  29.9 L   29.7 L  (31.0-37.0)  g/dL


 


RDW  19.4 H   19.4 H  (11.5-15.5)  %


 


Plt Count  110 L   88 L  (150-450)  k/uL


 


Carbon Dioxide   33 H   (22-30)  mmol/L


 


BUN   34 H   (7-17)  mg/dL


 


Creatinine   1.09 H   (0.52-1.04)  mg/dL


 


Calcium   8.0 L   (8.4-10.2)  mg/dL


 


Total Protein   4.8 L   (6.3-8.2)  g/dL


 


Albumin   2.2 L   (3.5-5.0)  g/dL














Assessment and Plan


Plan: 





1.  Metabolic encephalopathy due to pain management and possible UTI with 

sepsis.  We will continue to monitor the patient very closely, urine culture, 

blood culture, start the patient on IV antibiotic in the form of levaquin 500 

mg IVPB q 48 h,urine culture was sent.





2.  Acute and chronic blood loss anemia secondary to possible GI bleed as well 

as hemoptysis.  Consult with Dr. Jennifer Llamas and aspirin discontinued.





3. CAD post-PCI of the LAD.  Continue metoprolol 25 mg orally twice every day, 

Lipitor 40 mg orally once every day, losartan 50 mg orally once every day, and 

metoprolol 25 mg orally twice every day.





4.  History of CVA of the left frontal lobe.resume Eliquis 2.5 mg po bid --on 

hold and monitor HGB in the hospital.





5.  Carotid artery disease as well as PAD.  Currently under the care of 

vascular surgery continue Lipitor 40 mg orally once every day for secondary 

prevention.





6.  Hypertension and hypertensive cardiovascular disease.  Continue losartan 50 

mg orally once every day, metoprolol 25 mg orally twice every day, hydralazine 

75 mg orally 2 times every day.





7.  Hyperlipidemia.  Continue Lipitor 40 mg orally once every day.





8.  ALLERGIC rhinitis.  Continue singular 10 mg at bedtime.





9.  Lumbar degenerative disc disease with spondylolisthesis and facet 

arthropathy.  Post epidural injection.





9. Chronic atrial fibrillation.will continue metoprolol 25 mg orally twice 

every day and hold Eliquis .





10.  DVT prophylaxis.  Patient will be on Eliquis for now she will have knee-

high KEVIN hose as well as SCD's.





11.  GI prophylaxis.  Continue Protonix 40 mg orally once every day along with 

Carafate 1 g orally twice every day.





12.  Patient is full code.





13.  Admit to inpatient.  Estimate length of stay 2 midnights.





14.  Prognosis is guarded.





Discharge plan: Return home 





Impression and plan of care have been directed as dictated by the signing 

physician.  Danielle Herbert nurse practitioner acting as scribe for signing 

physician.


Time with Patient: Greater than 30

## 2017-04-29 LAB
IRON SERPL-MCNC: <10 UG/DL (ref 37–170)
TIBC SERPL-MCNC: 217 UG/DL (ref 265–497)

## 2017-04-29 RX ADMIN — SODIUM FERRIC GLUCONATE COMPLEX SCH MLS/HR: 12.5 INJECTION INTRAVENOUS at 13:56

## 2017-04-29 RX ADMIN — POTASSIUM CHLORIDE SCH MEQ: 20 TABLET, EXTENDED RELEASE ORAL at 08:56

## 2017-04-29 RX ADMIN — METOPROLOL TARTRATE SCH MG: 25 TABLET, FILM COATED ORAL at 21:17

## 2017-04-29 RX ADMIN — SUCRALFATE SCH GM: 1 TABLET ORAL at 21:17

## 2017-04-29 RX ADMIN — CEPHALEXIN SCH MG: 500 CAPSULE ORAL at 21:17

## 2017-04-29 RX ADMIN — METOPROLOL TARTRATE SCH MG: 25 TABLET, FILM COATED ORAL at 08:56

## 2017-04-29 RX ADMIN — FUROSEMIDE SCH MG: 40 TABLET ORAL at 08:56

## 2017-04-29 RX ADMIN — Medication SCH MG: at 21:17

## 2017-04-29 RX ADMIN — Medication SCH MG: at 12:47

## 2017-04-29 RX ADMIN — MONTELUKAST SODIUM SCH MG: 10 TABLET, FILM COATED ORAL at 21:17

## 2017-04-29 RX ADMIN — ATORVASTATIN CALCIUM SCH MG: 40 TABLET, FILM COATED ORAL at 21:17

## 2017-04-29 RX ADMIN — LOSARTAN POTASSIUM SCH MG: 50 TABLET, FILM COATED ORAL at 12:47

## 2017-04-29 RX ADMIN — CEPHALEXIN SCH MG: 500 CAPSULE ORAL at 13:57

## 2017-04-29 RX ADMIN — SUCRALFATE SCH GM: 1 TABLET ORAL at 08:56

## 2017-04-29 RX ADMIN — FAMOTIDINE SCH MG: 20 TABLET, FILM COATED ORAL at 08:56

## 2017-04-29 NOTE — P.PN
Subjective





This is a 77-year-old  female one of my patient with a previous 

medical history significant for CAD post-PCI of the LAD, hypertension and 

hypertensive cardio vascular disease with left ventricular hypertrophy, 

hyperlipidemia, history of the for arterial occlusive disease, carotid artery 

disease status post right carotid endarterectomy with about 70% stenosis of 

bilateral carotid arteries, has been under the care of cardiology, pulmonary 

medicine, vascular surgery, gastroenterology, she was recently hospitalized at 

[facility] in 03/16/2017 after she was admitted for acute 

respiratory failure secondary to acute diastolic heart failure and was seen and 

evaluated by pulmonary and cardiology along with gastroenterology at that time 

she was given 2 units of packed red blood cells and the patient was sent home , 

subsequently she followed with me as an outpatient, patient developed to have a 

significant anemia with significant GI bleed at that time she was sent to the 

ER in 04/08/2017 she was treated there and the patient was sent home however he 

was recommended for the patient to go back and her Eliquis despite the fact 

that she has been anemic and has been having some blood loss with recurrent 

transfusion, patient was very weak to day after she had a bowel movement and 

stated that there is blood in stool ,so her son Ivan brought her to the 

emergency department at [facility] today with mental status changes 

she was found to have a urinary tract infection as well as encephalopathy at 

the same time stated that she has been taking her Eliquis at this time on the 

recommendations of her cardiologist, patient did have a hemoglobin of 9.9, she 

did have a urinary tract infection and she shows some signs of encephalopathy 

thought to be due to pain medication.





4/26: Patient has been seen by cardiology with recommendations to resume 

eliquis and aspirin, 2 doses of IV Lasix and change back to oral in the morning 

at her home dose.  Troponins have been 0.044, 0.035 and 0.045.  ProBNP was 3540.





4/27: Patient's oxycodone was resumed at twice daily versus 3 times daily.  

Hemoglobin has dropped to 8.2.  She has not had a bowel movement.  We'll plan 

to repeat hemoglobin tonight at 6 PM.  Platelet count is dropped also to 87.  

Her culture is showing greater than 100,000 colonies of gram-negative bacilli.  

Patient is on Levaquin.  PT and OT consults have been added.





4/28: Hemoglobin is now down to 7.7.  Patient states she has not had a bowel 

movement.  She denies any shortness of breath.  She has been off aspirin and 

eliquis.  She does have some generalized upper abdominal tenderness.  Awaiting 

consult from Dr. Clarke.  Patient may require transfer to [facility] 

for further evaluation.  Patient's nurse states that she was confused during 

the night but currently at baseline, alert and oriented 3.





4/28: Patient denies any melanocytic stools or bright red blood per rectum, GI 

consult were reviewed, and agrees currently is on hold indefinitely, patient is 

reluctant ongoing to any anticoagulation,  Dr. Jaime have recommended a double 

balloon pump enteroscopy in the future should anticoagulation be ENFORCED to 

restarted, iron studies are requested, 2 doses Ferrlecit infusion for a 

previous iron is studies off 3.5% iron and ferritin of 10 and transfuse for 

hemoglobin under 7.0





Objective





- Vital Signs


Vital signs: 


 Vital Signs











Temp  97.0 F L  04/29/17 07:00


 


Pulse  84   04/29/17 08:00


 


Resp  16   04/29/17 08:00


 


BP  117/58   04/29/17 07:00


 


Pulse Ox  93 L  04/29/17 07:00








 Intake & Output











 04/28/17 04/29/17 04/29/17





 18:59 06:59 18:59


 


Intake Total 480  1400


 


Output Total 100  


 


Balance 380  1400


 


Weight 67.5 kg 71.5 kg 


 


Intake:   


 


  Intake, IV Titration   200





  Amount   


 


    Levofloxacin 500Mg-D5w   100





    Pmx 500 mg In Dextrose/   





    Water 1 100ml.bag @ 100   





    mls/hr IVPB Q48H CORBIN Rx#:   





    [id number]   


 


    Sodium Ferric Gluconat-   100





    Sucrose 125 mg In Sodium   





    Chloride 0.9% 100 ml @   





    100 mls/hr IVPB Q24H CORBIN   





    Rx#:[id number]   


 


  Oral 480  1200


 


Output:   


 


  Urine 100  


 


Other:   


 


  Voiding Method Toilet Toilet Toilet


 


  # Voids 6 3 240


 


  # Bowel Movements 0  1














- Constitutional


General appearance: Present: cooperative, no acute distress





- EENT


Eyes: Present: anicteric sclerae, PERRLA, dentition normal, normal appearance


ENT: Present: NA/AT, normal oropharynx





- Neck


Neck: Present: normal ROM





- Respiratory


Respiratory: bilateral: CTA, negative: diminished, dullness, rales, rhonchi





- Cardiovascular


Rhythm: irregularly irregular


Heart sounds: normal: S1, S2


Abnormal Heart Sounds: Present: systolic murmur.  Absent: diastolic murmur, rub

, S3 Gallop, S4 Gallop, click, other





- Gastrointestinal


General gastrointestinal: Present: normal bowel sounds.  Absent: absent bowel 

sounds, decreased bowel sounds, distended, hepatomegaly, hyperactive bowel 

sounds, organomegaly, rigid, scaphoid, soft, splenomegaly, tenderness, 

umbilical hernia, ventral hernia





- Integumentary


Integumentary: Present: normal, normal turgor





- Neurologic


Neurologic: Present: CNII-XII intact





- Musculoskeletal


Musculoskeletal: Present: gait normal, strength equal bilaterally





- Psychiatric


Psychiatric: Present: A&O x's 3, intact judgment & insight





- Labs


CBC & Chem 7: 


 04/28/17 07:01





 04/28/17 06:58





Assessment and Plan


Plan: 





1.  Metabolic encephalopathy due to pain management and possible UTI with 

sepsis multidrug resistant E. coli.  We will continue to monitor the patient 

very closely, urine culture, blood culture, start the patient on IV antibiotic 

in the form of levaquin 500 mg IVPB q 48 h,urine culture was sent.,  E. coli 

resistant to Levaquin, this was changed to oral cephalexin





2.  Acute and chronic blood loss anemia secondary to possible GI bleed as well 

as hemoptysis.  Consult with Dr. Jennifer Llamas  discontinued.  Multiple workup 

including capsule endoscopy was performed without any pathologies





3. CAD post-PCI of the LAD.  Continue metoprolol 25 mg orally twice every day, 

Lipitor 40 mg orally once every day, losartan 50 mg orally once every day, and 

metoprolol 25 mg orally twice every day.





4.  History of CVA of the left frontal lobe.resume Eliquis 2.5 mg po bid --on 

hold and monitor HGB in the hospital.





5.  Carotid artery disease as well as PAD.  Currently under the care of 

vascular surgery continue Lipitor 40 mg orally once every day for secondary 

prevention.





6.  Hypertension and hypertensive cardiovascular disease.  Continue losartan 50 

mg orally once every day, metoprolol 25 mg orally twice every day, hydralazine 

75 mg orally 2 times every day.





7.  Hyperlipidemia.  Continue Lipitor 40 mg orally once every day.





8.  ALLERGIC rhinitis.  Continue singular 10 mg at bedtime.





9.  Lumbar degenerative disc disease with spondylolisthesis and facet 

arthropathy.  Post epidural injection.





9. Chronic atrial fibrillation.will continue metoprolol 25 mg orally twice 

every day and hold Eliquis .,  Would need alternative treatments to include 

left atrial appendage surgery.  Cardiology is to decide on this matter





10.  DVT prophylaxis.  Patient will be on Eliquis for now she will have knee-

high KEVIN hose as well as SCD's.





11.  GI prophylaxis.  Continue Protonix 40 mg orally once every day along with 

Carafate 1 g orally twice every day.





12.  Patient is full code.





13.  Admit to inpatient.  Estimate length of stay 2 midnights.





14.  Prognosis is guarded.





Discharge plan: Return home in a.m. Sunday if hemoglobin stable

## 2017-04-29 NOTE — P.CONS
History of Present Illness





- Reason for Consult


Consult date: 04/28/17


Anemia





- History of Present Illness





The patient is a 71-year old female who was admited through the ER because of 

urinary infection and metabolic encephalopathy. The patient has history of 

anemia believed to be secondary to blood loss. She has history of CAHD and has 

been on eliquis. Recent GI workup included EGD and colonoscopy in December with 

finding of diverticular disease and gastritis and a capsule endoscopy around 1-

2 months which was normal. The patient was apparently having blood in her 

stools on admission. Hb gradually dropped to 7.7 at this time. 





Review of Systems





Constitutional: Denies fever, chills, sweats, weight gain, or loss.


HEENT: Negative for migraines, blurred vision or loss, earaches, drainage, 

tinnitus, oral mucosal lesions, dysphagia, or odynophagia. Had mental changes 

on admission and history of TIA/CVA


Cardiac: Negative for chest pain. History of CAHD, HTN, hyperlipidemia and Afib


Respiratory: Negative for shortness of breath, hemoptysis, cough, or sputum 

production. History of asthma/COPD


Gastrointestinal: See HPI for pertinent findings.


Genitourinary: Negative for hematuria, urgency, frequency, polyuria or dysuria. 

Finding of UTI on admission


Musculoskeletal: History of OA.  


Neurologic: Histtory of TIA/CVA.


Endocrine: No DM or thyroid problems.


Skin: Negative for rash or itching.


Psychiatric: Negative history for depression and anxiety





Past Medical History


Past Medical History: Atrial Fibrillation, Asthma, Coronary Artery Disease (CAD)

, COPD, CVA/TIA, GERD/Reflux, GI Bleed, Hyperlipidemia, Hypertension, Mitral 

Valve Prolapse (MVP), Musculoskeletal Disorder, Osteoarthritis (OA), 

Respiratory Disorder, Vascular Disorder


Additional Past Medical History / Comment(s): diverticulitits


Last Myocardial Infarction Date:: unknown


History of Any Multi-Drug Resistant Organisms: None Reported


Past Surgical History: Cholecystectomy, Heart Catheterization With Stent, 

Hysterectomy, Orthopedic Surgery


Additional Past Surgical History / Comment(s): carotid, back sx


Past Anesthesia/Blood Transfusion Reactions: No Reported Reaction


Date of Last Stent Placement:: 2011


Past Psychological History: No Psychological Hx Reported


Smoking Status: Current some day smoker


Past Alcohol Use History: None Reported


Past Drug Use History: None Reported





- Past Family History


  ** Son(s)


Family Medical History: No Reported History





  ** Daughter(s)


Family Medical History: No Reported History





  ** Mother


Family Medical History: Congestive Heart Failure (CHF)





  ** Father


Family Medical History: Cancer


Additional Family Medical History / Comment(s): Bone ca





  ** Sister(s)


Family Medical History: Coronary Artery Disease (CAD)





  ** Brother(s)


Family Medical History: Coronary Artery Disease (CAD), Myocardial Infarction (MI

)





Medications and Allergies


 Home Medications











 Medication  Instructions  Recorded  Confirmed  Type


 


Montelukast Sodium [Singulair] 10 mg PO HS 09/20/14 04/25/17 History


 


ALPRAZolam [Xanax] 0.5 mg PO HS 12/05/15 04/25/17 History


 


Apixaban [Eliquis] 2.5 mg PO BID 12/05/15 04/25/17 History


 


Metoprolol Tartrate [Lopressor] 25 mg PO BID 12/05/15 04/25/17 History


 


Nitroglycerin Sl Tabs [Nitrostat] 0.4 mg SUBLINGUAL Q5M PRN 12/05/15 04/25/17 

History


 


Atorvastatin [Lipitor] 40 mg PO HS 12/06/15 04/25/17 History


 


Ergocalciferol [Vitamin D2 50,000 unit PO ARGUETA 03/15/17 04/25/17 History





(DRISDOL)]    


 


Ipratropium-Albuterol Nebulize 3 ml INHALATION RT-QID PRN 03/15/17 04/25/17 

History





[Duoneb 0.5 mg-3 mg/3 ml Soln]    


 


Melatonin 3 mg PO HS 03/15/17 04/25/17 History


 


Multivitamins, Thera [Multivitamin 1 tab PO DAILY 03/15/17 04/25/17 History





(formulary)]    


 


Omeprazole 40 mg PO DAILY 03/15/17 04/25/17 History


 


amLODIPine [Norvasc] 10 mg PO DAILY 03/15/17 04/25/17 History


 


hydrALAZINE HCL [Apresoline] 75 mg PO BID 03/15/17 04/25/17 History


 


oxyCODONE HCL 20 mg PO TID 03/15/17 04/25/17 History


 


Aspirin 81 mg PO DAILY 04/04/17 04/25/17 History


 


Losartan Potassium [Cozaar] 100 mg PO DAILY 04/04/17 04/25/17 History


 


Potassium Chloride ER [K-Dur 20] 20 meq PO DAILY 04/04/17 04/25/17 History











 Allergies











Allergy/AdvReac Type Severity Reaction Status Date / Time


 


diphenhydramine Allergy  Unknown Verified 04/25/17 13:45





[From Benadryl]     


 


morphine Allergy  Rash/Hives Verified 04/25/17 13:45


 


pregabalin [From Lyrica] Allergy  Unknown Verified 04/25/17 13:45


 


Sulfa (Sulfonamide Allergy  Unknown Verified 04/25/17 13:45





Antibiotics)     














Physical Exam


Vitals: 


 Vital Signs











  Temp Pulse Pulse Pulse Resp BP Pulse Ox


 


 04/28/17 20:56     93   106/61  95


 


 04/28/17 17:45  97.9 F   97   16  114/53  96


 


 04/28/17 16:00    94  94  18  


 


 04/28/17 15:21   94     


 


 04/28/17 15:12   94     


 


 04/28/17 12:10   96     


 


 04/28/17 12:00   96  96  96  18  


 


 04/28/17 08:37   92     


 


 04/28/17 08:27   88     


 


 04/28/17 08:00    88  88  18  


 


 04/28/17 07:37  98.4 F   74   17  137/63  96


 


 04/27/17 23:16        92 L


 


 04/27/17 22:45  99.6 F    84  18  122/56  85 L


 


 04/27/17 21:09       103/49 








 Intake and Output











 04/28/17 04/28/17 04/28/17





 06:59 14:59 22:59


 


Intake Total 240 480 


 


Output Total  100 


 


Balance 240 380 


 


Intake:   


 


  Oral 240 480 


 


Output:   


 


  Urine  100 


 


Other:   


 


  Voiding Method  Toilet Toilet


 


  # Voids 2 1 6


 


  # Bowel Movements  0 0


 


  Weight 67.5 kg  67.5 kg








 Patient Weight











 04/29/17





 06:59


 


Weight 67.5 kg














General appearance: The patient is alert, oriented, in no acute distress.


HET: Head is normocephalic and atraumatic.  Pupils are equal and reactive.  

Oropharynx is clear without lesions.


Neck: Supple without lymphadenopathy.  Trachea midline.


Heart: No abnormal sounds, murmurs or friction rubs.


Lungs: Clear to auscultation, no dullness to percussion.


Abdomen: Soft, nontender, nondistended with  bowel sounds.  No peritoneal 

signs.  No palpable organomegaly or masses.


Extremities: Normal skin color and turgor.  No cyanosis, rash, ulceration, 

clubbing, or edema.  Radial and pedal pulses are 2/4 bilaterally.


Neurological: No focal deficits.  Strength and sensation are grossly intact.





Results


CBC & Chem 7: 


 04/28/17 07:01





 04/28/17 06:58


Labs: 


 Abnormal Lab Results - Last 24 Hours (Table)











  04/28/17 04/28/17 Range/Units





  06:58 07:01 


 


RBC   3.13 L  (3.80-5.40)  m/uL


 


Hgb   7.7 L  (11.4-16.0)  gm/dL


 


Hct   25.8 L  (34.0-46.0)  %


 


MCH   24.5 L  (25.0-35.0)  pg


 


MCHC   29.7 L  (31.0-37.0)  g/dL


 


RDW   19.4 H  (11.5-15.5)  %


 


Plt Count   88 L  (150-450)  k/uL


 


Carbon Dioxide  33 H   (22-30)  mmol/L


 


BUN  34 H   (7-17)  mg/dL


 


Creatinine  1.09 H   (0.52-1.04)  mg/dL


 


Calcium  8.0 L   (8.4-10.2)  mg/dL


 


Total Protein  4.8 L   (6.3-8.2)  g/dL


 


Albumin  2.2 L   (3.5-5.0)  g/dL














Assessment and Plan


Plan: 





Anemia, likely related to blood loss while on eliquis. Doubt we are dealing 

with significant source such as PUD based on her prior workup. Intestinal 

ectasias that we have failed to demonstrate previously are likely etiology. 


Agree with your current management. She could be considered for double balloon 

enteroscopy especially if she needs to be on anticoagulation in the future.

## 2017-04-30 LAB
ANION GAP SERPL CALC-SCNC: 4 MMOL/L
BUN SERPL-SCNC: 28 MG/DL (ref 7–17)
CALCIUM SPEC-MCNC: 8.3 MG/DL (ref 8.4–10.2)
CELLS COUNTED: 200
CH: 24.8
CHCM: 30.3
CHLORIDE SERPL-SCNC: 104 MMOL/L (ref 98–107)
CO2 SERPL-SCNC: 34 MMOL/L (ref 22–30)
ERYTHROCYTE [DISTWIDTH] IN BLOOD BY AUTOMATED COUNT: 3.3 M/UL (ref 3.8–5.4)
ERYTHROCYTE [DISTWIDTH] IN BLOOD: 19.8 % (ref 11.5–15.5)
GLUCOSE SERPL-MCNC: 81 MG/DL (ref 74–99)
HCT VFR BLD AUTO: 26.9 % (ref 34–46)
HDW: 3.76
HGB BLD-MCNC: 8.3 GM/DL (ref 11.4–16)
MCH RBC QN AUTO: 25.1 PG (ref 25–35)
MCHC RBC AUTO-ENTMCNC: 30.7 G/DL (ref 31–37)
MCV RBC AUTO: 81.6 FL (ref 80–100)
NON-AFRICAN AMERICAN GFR(MDRD): 49
POTASSIUM SERPL-SCNC: 3.7 MMOL/L (ref 3.5–5.1)
SODIUM SERPL-SCNC: 142 MMOL/L (ref 137–145)
WBC # BLD AUTO: 5.7 K/UL (ref 3.8–10.6)
WBC (PEROX): 5.63

## 2017-04-30 RX ADMIN — MONTELUKAST SODIUM SCH MG: 10 TABLET, FILM COATED ORAL at 20:55

## 2017-04-30 RX ADMIN — Medication SCH MG: at 20:55

## 2017-04-30 RX ADMIN — FAMOTIDINE SCH MG: 20 TABLET, FILM COATED ORAL at 08:01

## 2017-04-30 RX ADMIN — SODIUM FERRIC GLUCONATE COMPLEX SCH: 12.5 INJECTION INTRAVENOUS at 14:26

## 2017-04-30 RX ADMIN — SUCRALFATE SCH GM: 1 TABLET ORAL at 08:01

## 2017-04-30 RX ADMIN — METOPROLOL TARTRATE SCH MG: 25 TABLET, FILM COATED ORAL at 08:01

## 2017-04-30 RX ADMIN — CEPHALEXIN SCH MG: 500 CAPSULE ORAL at 16:55

## 2017-04-30 RX ADMIN — SUCRALFATE SCH GM: 1 TABLET ORAL at 20:55

## 2017-04-30 RX ADMIN — ACETAMINOPHEN PRN MG: 325 TABLET, FILM COATED ORAL at 16:55

## 2017-04-30 RX ADMIN — LOSARTAN POTASSIUM SCH MG: 50 TABLET, FILM COATED ORAL at 13:22

## 2017-04-30 RX ADMIN — FUROSEMIDE SCH MG: 40 TABLET ORAL at 08:01

## 2017-04-30 RX ADMIN — CEPHALEXIN SCH MG: 500 CAPSULE ORAL at 21:18

## 2017-04-30 RX ADMIN — POTASSIUM CHLORIDE SCH MEQ: 20 TABLET, EXTENDED RELEASE ORAL at 08:01

## 2017-04-30 RX ADMIN — METOPROLOL TARTRATE SCH MG: 25 TABLET, FILM COATED ORAL at 21:18

## 2017-04-30 RX ADMIN — CEPHALEXIN SCH MG: 500 CAPSULE ORAL at 08:01

## 2017-04-30 RX ADMIN — ATORVASTATIN CALCIUM SCH MG: 40 TABLET, FILM COATED ORAL at 20:55

## 2017-04-30 NOTE — P.DS
Providers


Date of admission: 


04/27/17 08:09





Attending physician: 


Kinjal Hood





Consults: 





 





04/27/17 13:06


Consult Physician Routine 


   Consulting Provider: Trey Mejia


   Consult Reason/Comments: gi bleed


   Do you want consulting provider notified?: Yes











Primary care physician: 


Kinjal Hood





Hospital Course: 





This is a 77-year-old  female one of my patient with a previous 

medical history significant for CAD post-PCI of the LAD, hypertension and 

hypertensive cardio vascular disease with left ventricular hypertrophy, 

hyperlipidemia, history of the for arterial occlusive disease, carotid artery 

disease status post right carotid endarterectomy with about 70% stenosis of 

bilateral carotid arteries, has been under the care of cardiology, pulmonary 

medicine, vascular surgery, gastroenterology, she was recently hospitalized at 

[facility] in 03/16/2017 after she was admitted for acute 

respiratory failure secondary to acute diastolic heart failure and was seen and 

evaluated by pulmonary and cardiology along with gastroenterology at that time 

she was given 2 units of packed red blood cells and the patient was sent home , 

subsequently she followed with me as an outpatient, patient developed to have a 

significant anemia with significant GI bleed at that time she was sent to the 

ER in 04/08/2017 she was treated there and the patient was sent home however he 

was recommended for the patient to go back and her Eliquis despite the fact 

that she has been anemic and has been having some blood loss with recurrent 

transfusion, patient was very weak to day after she had a bowel movement and 

stated that there is blood in stool ,so her son Ivan brought her to the 

emergency department at [facility] today with mental status changes 

she was found to have a urinary tract infection as well as encephalopathy at 

the same time stated that she has been taking her Eliquis at this time on the 

recommendations of her cardiologist, patient did have a hemoglobin of 9.9, she 

did have a urinary tract infection and she shows some signs of encephalopathy 

thought to be due to pain medication.





4/26: Patient has been seen by cardiology with recommendations to resume 

eliquis and aspirin, 2 doses of IV Lasix and change back to oral in the morning 

at her home dose.  Troponins have been 0.044, 0.035 and 0.045.  ProBNP was 3540.





4/27: Patient's oxycodone was resumed at twice daily versus 3 times daily.  

Hemoglobin has dropped to 8.2.  She has not had a bowel movement.  We'll plan 

to repeat hemoglobin tonight at 6 PM.  Platelet count is dropped also to 87.  

Her culture is showing greater than 100,000 colonies of gram-negative bacilli.  

Patient is on Levaquin.  PT and OT consults have been added.





4/28: Hemoglobin is now down to 7.7.  Patient states she has not had a bowel 

movement.  She denies any shortness of breath.  She has been off aspirin and 

eliquis.  She does have some generalized upper abdominal tenderness.  Awaiting 

consult from Dr. Clarke.  Patient may require transfer to [facility] 

for further evaluation.  Patient's nurse states that she was confused during 

the night but currently at baseline, alert and oriented 3.





4/29: Patient denies any melanocytic stools or bright red blood per rectum, GI 

consult were reviewed, and agrees currently is on hold indefinitely, patient is 

reluctant ongoing to any anticoagulation,  Dr. Jaime have recommended a double 

balloon pump enteroscopy in the future should anticoagulation be ENFORCED to 

restarted, iron studies are requested, 2 doses Ferrlecit infusion for a 

previous iron is studies off 3.5% iron and ferritin of 10 and transfuse for 

hemoglobin under 7.0





FINAL DIAGNOSIS





1.  Metabolic encephalopathy due to pain management and possible UTI with 

sepsis multidrug resistant E. coli.  We will continue to monitor the patient 

very closely, urine culture, blood culture, started   IV antibiotic in the form 

of levaquin 500 mg IVPB q 48 h,urine culture was sent.,  E. coli resistant to 

Levaquin, this was changed to oral cephalexin





2.  Acute and chronic blood loss anemia secondary to possible GI bleed as well 

as hemoptysis.  Consult with Dr. Mejia.  Eliquis  discontinued.  Multiple workup 

including capsule endoscopy was performed without any pathologies Eliquis 

discontinued





3. CAD post-PCI of the LAD.  Continue metoprolol 25 mg orally twice every day, 

Lipitor 40 mg orally once every day, losartan 50 mg orally once every day, and 

metoprolol 25 mg orally twice every day.





4.  History of CVA of the left frontal lobe.resume Eliquis 2.5 mg po bid --on 

hold and monitor HGB in the hospital and discontinued





5.  Carotid artery disease as well as PAD.  Currently under the care of 

vascular surgery continue Lipitor 40 mg orally once every day for secondary 

prevention.





6.  Hypertension and hypertensive cardiovascular disease.  Continue losartan 50 

mg orally once every day, metoprolol 25 mg orally twice every day, hydralazine 

75 mg orally 2 times every day.





7.  Hyperlipidemia.  Continue Lipitor 40 mg orally once every day.





8.  ALLERGIC rhinitis.  Continue singular 10 mg at bedtime.





9.  Lumbar degenerative disc disease with spondylolisthesis and facet 

arthropathy.  Post epidural injection.





9. Chronic atrial fibrillation.will continue metoprolol 25 mg orally increased 

to 3 times a day and discontinued Eliquis .,  Would need alternative treatments 

to include left atrial appendage surgery.  Cardiology is to decide on this 

matter





10.  DVT prophylaxis.  Patient will be on Eliquis for now she will have knee-

high KEIVN hose as well as SCD's.





11.  GI prophylaxis.  Continue Protonix 40 mg orally once every day along with 

Carafate 1 g orally twice every day.





12.  Iron deficiency anemia, patient completed 2 doses of for ferrlecit 125 mg/

dose





12.  Patient is full code.





Discharge condition stable and improved








Discharge Medication List





Montelukast Sodium [Singulair] 10 mg PO HS 09/20/14 [History]


ALPRAZolam [Xanax] 0.5 mg PO HS 12/05/15 [History]


Metoprolol Tartrate [Lopressor] 25 mg PO TID 12/05/15 [History]


Nitroglycerin Sl Tabs [Nitrostat] 0.4 mg SUBLINGUAL Q5M PRN 12/05/15 [History]


Atorvastatin [Lipitor] 40 mg PO HS 12/06/15 [History]


Ergocalciferol [Vitamin D2 (DRISDOL)] 50,000 unit PO ARGUETA 03/15/17 [History]


Ipratropium-Albuterol Nebulize [Duoneb 0.5 mg-3 mg/3 ml Soln] 3 ml INHALATION RT

-QID PRN 03/15/17 [History]


Melatonin 3 mg PO HS 03/15/17 [History]


Multivitamins, Thera [Multivitamin (formulary)] 1 tab PO DAILY 03/15/17 [History

]


Omeprazole 40 mg PO DAILY 03/15/17 [History]


amLODIPine [Norvasc] 10 mg PO DAILY 03/15/17 [History]


hydrALAZINE HCL [Apresoline] 75 mg PO BID 03/15/17 [History]


oxyCODONE HCL 20 mg PO TID 03/15/17 [History]


Sucralfate [Carafate] 1 gm PO BID #600 ml 03/20/17 [Rx]


Aspirin 81 mg PO DAILY 04/04/17 [History]


Losartan Potassium [Cozaar] 100 mg PO DAILY 04/04/17 [History]


Potassium Chloride ER [K-Dur 20] 20 meq PO DAILY 04/04/17 [History]


Ferrous Sulfate [Iron (65 MG Elemental)] 325 mg PO W/LUNCH  tab 04/08/17 [Rx]


Cephalexin [Keflex] 500 mg PO Q8HR #21 cap 04/29/17 [Rx]


Metoprolol Tartrate [Lopressor] 25 mg PO TID #0 tab 04/30/17 [Rx]








Patient Condition at Discharge: Good





Plan - Discharge Summary


New Discharge Prescriptions: 


Cephalexin [Keflex] 500 mg PO Q8HR #21 cap


Discharge Medication List





Montelukast Sodium [Singulair] 10 mg PO HS 09/20/14 [History]


ALPRAZolam [Xanax] 0.5 mg PO HS 12/05/15 [History]


Metoprolol Tartrate [Lopressor] 25 mg PO TID 12/05/15 [History]


Nitroglycerin Sl Tabs [Nitrostat] 0.4 mg SUBLINGUAL Q5M PRN 12/05/15 [History]


Atorvastatin [Lipitor] 40 mg PO HS 12/06/15 [History]


Ergocalciferol [Vitamin D2 (DRISDOL)] 50,000 unit PO ARGUETA 03/15/17 [History]


Ipratropium-Albuterol Nebulize [Duoneb 0.5 mg-3 mg/3 ml Soln] 3 ml INHALATION RT

-QID PRN 03/15/17 [History]


Melatonin 3 mg PO HS 03/15/17 [History]


Multivitamins, Thera [Multivitamin (formulary)] 1 tab PO DAILY 03/15/17 [History

]


Omeprazole 40 mg PO DAILY 03/15/17 [History]


amLODIPine [Norvasc] 10 mg PO DAILY 03/15/17 [History]


hydrALAZINE HCL [Apresoline] 75 mg PO BID 03/15/17 [History]


oxyCODONE HCL 20 mg PO TID 03/15/17 [History]


Sucralfate [Carafate] 1 gm PO BID #600 ml 03/20/17 [Rx]


Aspirin 81 mg PO DAILY 04/04/17 [History]


Losartan Potassium [Cozaar] 100 mg PO DAILY 04/04/17 [History]


Potassium Chloride ER [K-Dur 20] 20 meq PO DAILY 04/04/17 [History]


Ferrous Sulfate [Iron (65 MG Elemental)] 325 mg PO W/LUNCH  tab 04/08/17 [Rx]


Cephalexin [Keflex] 500 mg PO Q8HR #21 cap 04/29/17 [Rx]


Metoprolol Tartrate [Lopressor] 25 mg PO TID #0 tab 04/30/17 [Rx]








Follow up Appointment(s)/Referral(s): 


Montrell Mccord MD [STAFF PHYSICIAN] - 1 Week (patient to call Dr. Mccord's 

office Monday morning to schedule follow up appointment. The office is closed 

at time of discharge.)


Kinjal Hood MD [Primary Care Provider] - 1-2 days (patient to call Dr. Hood's 

office Monday morning to schedule follow up appointment. The office is closed 

at time of discharge.)


Patient Instructions/Handouts:  Cephalexin (By mouth), Gastrointestinal 

Bleeding (DC), Urinary Tract Infection in Women (DC), Chronic Kidney Disease (DC

)

## 2017-05-01 VITALS
DIASTOLIC BLOOD PRESSURE: 63 MMHG | TEMPERATURE: 98.2 F | SYSTOLIC BLOOD PRESSURE: 151 MMHG | HEART RATE: 93 BPM | RESPIRATION RATE: 16 BRPM

## 2017-05-01 RX ADMIN — CEPHALEXIN SCH MG: 500 CAPSULE ORAL at 08:31

## 2017-05-01 RX ADMIN — FUROSEMIDE SCH MG: 40 TABLET ORAL at 08:31

## 2017-05-01 RX ADMIN — SUCRALFATE SCH GM: 1 TABLET ORAL at 08:31

## 2017-05-01 RX ADMIN — FAMOTIDINE SCH MG: 20 TABLET, FILM COATED ORAL at 08:31

## 2017-05-01 RX ADMIN — LOSARTAN POTASSIUM SCH MG: 50 TABLET, FILM COATED ORAL at 12:40

## 2017-05-01 RX ADMIN — POTASSIUM CHLORIDE SCH MEQ: 20 TABLET, EXTENDED RELEASE ORAL at 08:31

## 2017-05-01 RX ADMIN — METOPROLOL TARTRATE SCH MG: 25 TABLET, FILM COATED ORAL at 08:31

## 2017-05-07 ENCOUNTER — HOSPITAL ENCOUNTER (INPATIENT)
Dept: HOSPITAL 47 - EC | Age: 72
LOS: 3 days | Discharge: NURSING, REHAB OR LONG-TERM CARE | DRG: 291 | End: 2017-05-10
Payer: MEDICARE

## 2017-05-07 VITALS — BODY MASS INDEX: 24.7 KG/M2

## 2017-05-07 DIAGNOSIS — I65.23: ICD-10-CM

## 2017-05-07 DIAGNOSIS — I73.9: ICD-10-CM

## 2017-05-07 DIAGNOSIS — Z82.49: ICD-10-CM

## 2017-05-07 DIAGNOSIS — Z95.5: ICD-10-CM

## 2017-05-07 DIAGNOSIS — I48.2: ICD-10-CM

## 2017-05-07 DIAGNOSIS — D50.0: ICD-10-CM

## 2017-05-07 DIAGNOSIS — M19.90: ICD-10-CM

## 2017-05-07 DIAGNOSIS — J44.0: ICD-10-CM

## 2017-05-07 DIAGNOSIS — M12.88: ICD-10-CM

## 2017-05-07 DIAGNOSIS — I47.2: ICD-10-CM

## 2017-05-07 DIAGNOSIS — E78.5: ICD-10-CM

## 2017-05-07 DIAGNOSIS — K57.90: ICD-10-CM

## 2017-05-07 DIAGNOSIS — I34.1: ICD-10-CM

## 2017-05-07 DIAGNOSIS — K21.9: ICD-10-CM

## 2017-05-07 DIAGNOSIS — K92.2: ICD-10-CM

## 2017-05-07 DIAGNOSIS — I25.10: ICD-10-CM

## 2017-05-07 DIAGNOSIS — F03.90: ICD-10-CM

## 2017-05-07 DIAGNOSIS — J45.909: ICD-10-CM

## 2017-05-07 DIAGNOSIS — Z88.8: ICD-10-CM

## 2017-05-07 DIAGNOSIS — H91.90: ICD-10-CM

## 2017-05-07 DIAGNOSIS — M43.16: ICD-10-CM

## 2017-05-07 DIAGNOSIS — F17.200: ICD-10-CM

## 2017-05-07 DIAGNOSIS — Z88.2: ICD-10-CM

## 2017-05-07 DIAGNOSIS — M51.36: ICD-10-CM

## 2017-05-07 DIAGNOSIS — Z79.82: ICD-10-CM

## 2017-05-07 DIAGNOSIS — J30.9: ICD-10-CM

## 2017-05-07 DIAGNOSIS — J96.21: ICD-10-CM

## 2017-05-07 DIAGNOSIS — J18.9: ICD-10-CM

## 2017-05-07 DIAGNOSIS — G93.41: ICD-10-CM

## 2017-05-07 DIAGNOSIS — I11.0: Primary | ICD-10-CM

## 2017-05-07 DIAGNOSIS — I50.33: ICD-10-CM

## 2017-05-07 DIAGNOSIS — Z79.899: ICD-10-CM

## 2017-05-07 LAB
ALP SERPL-CCNC: 96 U/L (ref 38–126)
ALT SERPL-CCNC: 32 U/L (ref 9–52)
ANION GAP SERPL CALC-SCNC: 7 MMOL/L
APTT BLD: 23.6 SEC (ref 22–30)
AST SERPL-CCNC: 29 U/L (ref 14–36)
BUN SERPL-SCNC: 39 MG/DL (ref 7–17)
CALCIUM SPEC-MCNC: 8.4 MG/DL (ref 8.4–10.2)
CELLS COUNTED: 100
CH: 24.8
CHCM: 30.1
CHLORIDE SERPL-SCNC: 104 MMOL/L (ref 98–107)
CK SERPL-CCNC: 154 U/L (ref 30–135)
CO2 SERPL-SCNC: 27 MMOL/L (ref 22–30)
ERYTHROCYTE [DISTWIDTH] IN BLOOD BY AUTOMATED COUNT: 3.16 M/UL (ref 3.8–5.4)
ERYTHROCYTE [DISTWIDTH] IN BLOOD: 20.9 % (ref 11.5–15.5)
GLUCOSE SERPL-MCNC: 86 MG/DL (ref 74–99)
HCT VFR BLD AUTO: 26 % (ref 34–46)
HDW: 3.71
HGB BLD-MCNC: 8 GM/DL (ref 11.4–16)
INR PPP: 1.2 (ref ?–1.1)
MAGNESIUM SPEC-SCNC: 2.2 MG/DL (ref 1.6–2.3)
MCH RBC QN AUTO: 25.3 PG (ref 25–35)
MCHC RBC AUTO-ENTMCNC: 30.7 G/DL (ref 31–37)
MCV RBC AUTO: 82.4 FL (ref 80–100)
NON-AFRICAN AMERICAN GFR(MDRD): 36
PH UR: 5.5 [PH] (ref 5–8)
POTASSIUM SERPL-SCNC: 5.2 MMOL/L (ref 3.5–5.1)
PROT SERPL-MCNC: 5.6 G/DL (ref 6.3–8.2)
PT BLD: 11.8 SEC (ref 9–12)
SODIUM SERPL-SCNC: 138 MMOL/L (ref 137–145)
SP GR UR: 1.01 (ref 1–1.03)
TROPONIN I SERPL-MCNC: 0.01 NG/ML (ref 0–0.03)
UA BILLING (MACRO VS. MICRO): (no result)
UROBILINOGEN UR QL STRIP: <2 MG/DL (ref ?–2)
WBC # BLD AUTO: 8.9 K/UL (ref 3.8–10.6)
WBC (PEROX): 8.43

## 2017-05-07 PROCEDURE — 99285 EMERGENCY DEPT VISIT HI MDM: CPT

## 2017-05-07 PROCEDURE — 82550 ASSAY OF CK (CPK): CPT

## 2017-05-07 PROCEDURE — 36415 COLL VENOUS BLD VENIPUNCTURE: CPT

## 2017-05-07 PROCEDURE — 85025 COMPLETE CBC W/AUTO DIFF WBC: CPT

## 2017-05-07 PROCEDURE — 84484 ASSAY OF TROPONIN QUANT: CPT

## 2017-05-07 PROCEDURE — 80053 COMPREHEN METABOLIC PANEL: CPT

## 2017-05-07 PROCEDURE — 83735 ASSAY OF MAGNESIUM: CPT

## 2017-05-07 PROCEDURE — 93005 ELECTROCARDIOGRAM TRACING: CPT

## 2017-05-07 PROCEDURE — 86901 BLOOD TYPING SEROLOGIC RH(D): CPT

## 2017-05-07 PROCEDURE — 96374 THER/PROPH/DIAG INJ IV PUSH: CPT

## 2017-05-07 PROCEDURE — 86900 BLOOD TYPING SEROLOGIC ABO: CPT

## 2017-05-07 PROCEDURE — 94640 AIRWAY INHALATION TREATMENT: CPT

## 2017-05-07 PROCEDURE — 83880 ASSAY OF NATRIURETIC PEPTIDE: CPT

## 2017-05-07 PROCEDURE — 85610 PROTHROMBIN TIME: CPT

## 2017-05-07 PROCEDURE — 71020: CPT

## 2017-05-07 PROCEDURE — 80048 BASIC METABOLIC PNL TOTAL CA: CPT

## 2017-05-07 PROCEDURE — 51701 INSERT BLADDER CATHETER: CPT

## 2017-05-07 PROCEDURE — 82553 CREATINE MB FRACTION: CPT

## 2017-05-07 PROCEDURE — 85730 THROMBOPLASTIN TIME PARTIAL: CPT

## 2017-05-07 PROCEDURE — 81003 URINALYSIS AUTO W/O SCOPE: CPT

## 2017-05-07 PROCEDURE — 87040 BLOOD CULTURE FOR BACTERIA: CPT

## 2017-05-07 PROCEDURE — 86920 COMPATIBILITY TEST SPIN: CPT

## 2017-05-07 PROCEDURE — 94760 N-INVAS EAR/PLS OXIMETRY 1: CPT

## 2017-05-07 PROCEDURE — 85027 COMPLETE CBC AUTOMATED: CPT

## 2017-05-07 PROCEDURE — 86850 RBC ANTIBODY SCREEN: CPT

## 2017-05-07 RX ADMIN — Medication SCH MG: at 22:00

## 2017-05-07 RX ADMIN — MORPHINE SULFATE ONE: 2 INJECTION, SOLUTION INTRAMUSCULAR; INTRAVENOUS at 10:13

## 2017-05-07 RX ADMIN — MONTELUKAST SODIUM SCH MG: 10 TABLET, FILM COATED ORAL at 22:01

## 2017-05-07 RX ADMIN — NITROGLYCERIN SCH: 20 OINTMENT TOPICAL at 22:02

## 2017-05-07 RX ADMIN — SUCRALFATE SCH GM: 1 TABLET ORAL at 22:01

## 2017-05-07 RX ADMIN — ATORVASTATIN CALCIUM SCH MG: 40 TABLET, FILM COATED ORAL at 22:00

## 2017-05-07 RX ADMIN — METOPROLOL TARTRATE SCH MG: 25 TABLET, FILM COATED ORAL at 23:26

## 2017-05-07 RX ADMIN — FUROSEMIDE SCH MG: 10 INJECTION, SOLUTION INTRAMUSCULAR; INTRAVENOUS at 23:27

## 2017-05-07 RX ADMIN — NITROGLYCERIN SCH: 20 OINTMENT TOPICAL at 13:44

## 2017-05-07 RX ADMIN — Medication SCH MG: at 15:47

## 2017-05-07 RX ADMIN — PANTOPRAZOLE SODIUM SCH MG: 40 INJECTION, POWDER, FOR SOLUTION INTRAVENOUS at 22:01

## 2017-05-07 RX ADMIN — METOPROLOL TARTRATE SCH MG: 25 TABLET, FILM COATED ORAL at 15:47

## 2017-05-07 RX ADMIN — MORPHINE SULFATE ONE MG: 2 INJECTION, SOLUTION INTRAMUSCULAR; INTRAVENOUS at 10:11

## 2017-05-07 RX ADMIN — FUROSEMIDE SCH MG: 10 INJECTION, SOLUTION INTRAMUSCULAR; INTRAVENOUS at 15:47

## 2017-05-07 RX ADMIN — NITROGLYCERIN SCH: 20 OINTMENT TOPICAL at 18:15

## 2017-05-07 NOTE — XR
EXAMINATION TYPE: XR chest 2V

 

DATE OF EXAM: 5/7/2017 9:56 AM

 

COMPARISON: 4/25/2017

 

HISTORY: Difficulty breathing

 

TECHNIQUE:  Frontal and lateral views of the chest are obtained.

 

FINDINGS:  New trace bilateral pleural effusions are seen. Additionally new patchy reticular airspace
 disease is present within the right midlung and right lower lobe superimposed upon chronic interstit
ial prominence. Left apical opacity is unchanged. On prior CT this corresponded to hypertrophic porti
on of the left first rib. Scattered linear opacities likely relate to platelike atelectasis.

 

Right carotid atherosclerosis is incidentally noted. Acromioclavicular arthropathy is unchanged. Card
ia mediastinal silhouette is mildly enlarged, unchanged from the prior.

 

IMPRESSION:  

1. New patchy opacities within the right midlung and right lower lobe suspicious for multifocal pneum
onia superimposed upon chronic interstitial lung disease.

2. Chronic interstitial prominence may also partially relate to interstitial pulmonary edema in a jimena
kground of chronic interstitial lung disease.

## 2017-05-07 NOTE — P.HPIM
History of Present Illness


H&P Date: 05/07/17


Chief Complaint: Shortness of breath





This is a 77-year-old  female patient of Dr. Hood with a previous 

medical history significant for CAD post-PCI of the LAD, hypertension and 

hypertensive cardio vascular disease with left ventricular hypertrophy, 

hyperlipidemia, history of the for arterial occlusive disease, carotid artery 

disease status post right carotid endarterectomy with about 70% stenosis of 

bilateral carotid arteries, has been under the care of cardiology, pulmonary 

medicine, vascular surgery, gastroenterology, she was recently hospitalized at 

[facility] in 03/16/2017 after she was admitted for acute respiratory 

failure secondary to acute diastolic heart failure and was seen and evaluated 

by pulmonary and cardiology along with gastroenterology at that time she was 

given 2 units of packed red blood cells and the patient was sent home , 

subsequently she followed with me as an outpatient, patient developed to have a 

significant anemia with significant GI bleed at that time she was sent to the 

ER in 04/08/2017 she was treated there and the patient was sent home however he 

was recommended for the patient to go back on her Eliquis despite the fact that 

she has been anemic and has been having some blood loss with recurrent 

transfusion, patient was very weak to day after she had a bowel movement and 

stated that there is blood in stool ,so her son Ivan brought her to the 

emergency department at [facility] 4/29/17 with mental status changes 

she was found to have a urinary tract infection as well as encephalopathy at 

the same time stated that she has been taking her Eliquis at this time on the 

recommendations of her cardiologist, patient did have a hemoglobin of 9.9, she 

did have a urinary tract infection and she shows some signs of encephalopathy 

thought to be due to pain medication. She was seen by cardiology with 

recommendations to resume eliquis and aspirin. Hemoglobin dropped to 7.7. 

Aspirin and eliquis were held.  As seen by Dr. Mejia recommended a double 

balloon enteroscopy in the future should anticoagulation be necessary.





She now presents from the nursing home with shortness of breath and decreased 

pulse ox as well as tachypnea and tachycardia with trace pedal edema.  BNP was 

5400.  Chest x-ray showed new patchy opacities in the right midlung and right 

lower lung suspicious for multifocal pneumonia superimposed upon chronic 

interstitial lung disease.  Chronic interstitial prominence may be related to 

interstitial pulmonary edema and background of chronic interstitial lung 

disease.  Her hemoglobin was 8.  She was in atrial fibrillation with rapid 

ventricular response below 110s.  BUN 39 and creatinine 1.44.  She was started 

on IV Lasix, nebulizer treatments and consult requested with cardiology. 





Review of Systems


ROS unobtainable: due to mental status


All systems: negative


Constitutional: Denies chills, Denies fever


Eyes: denies blurred vision, denies pain


Ears, nose, mouth and throat: Denies headache, Denies sore throat


Cardiovascular: Reports decreased exercise tolerance, Reports dyspnea on 

exertion, Reports leg edema, Reports shortness of breath, Denies chest pain


Respiratory: Reports dyspnea, Denies cough


Gastrointestinal: Denies abdominal pain, Denies diarrhea, Denies nausea, Denies 

vomiting


Genitourinary: Denies dysuria, Denies hematuria


Musculoskeletal: Denies myalgias


Integumentary: Denies pruritus, Denies rash


Neurological: Denies numbness, Denies weakness


Psychiatric: Denies anxiety, Denies depression


Endocrine: Denies fatigue, Denies weight change





Past Medical History


Past Medical History: Atrial Fibrillation, Asthma, Coronary Artery Disease (CAD)

, COPD, CVA/TIA, GERD/Reflux, GI Bleed, Hyperlipidemia, Hypertension, Mitral 

Valve Prolapse (MVP), Musculoskeletal Disorder, Osteoarthritis (OA), 

Respiratory Disorder, Vascular Disorder


Additional Past Medical History / Comment(s): diverticulitits


Last Myocardial Infarction Date:: unknown


History of Any Multi-Drug Resistant Organisms: None Reported


Past Surgical History: Cholecystectomy, Heart Catheterization With Stent, 

Hysterectomy, Orthopedic Surgery


Additional Past Surgical History / Comment(s): carotid, back sx


Past Anesthesia/Blood Transfusion Reactions: No Reported Reaction


Date of Last Stent Placement:: 2011


Past Psychological History: No Psychological Hx Reported


Smoking Status: Current some day smoker


Past Alcohol Use History: None Reported


Past Drug Use History: None Reported





- Past Family History


  ** Son(s)


Family Medical History: No Reported History





  ** Daughter(s)


Family Medical History: No Reported History





  ** Mother


Family Medical History: Congestive Heart Failure (CHF)





  ** Father


Family Medical History: Cancer


Additional Family Medical History / Comment(s): Bone ca





  ** Sister(s)


Family Medical History: Coronary Artery Disease (CAD)





  ** Brother(s)


Family Medical History: Coronary Artery Disease (CAD), Myocardial Infarction (MI

)





Medications and Allergies


 Home Medications











 Medication  Instructions  Recorded  Confirmed  Type


 


Montelukast Sodium [Singulair] 10 mg PO HS 09/20/14 05/07/17 History


 


Metoprolol Tartrate [Lopressor] 25 mg PO Q8H 12/05/15 05/07/17 History


 


Nitroglycerin Sl Tabs [Nitrostat] 0.4 mg SUBLINGUAL Q5M PRN 12/05/15 05/07/17 

History


 


Atorvastatin [Lipitor] 40 mg PO HS 12/06/15 05/07/17 History


 


Ergocalciferol [Vitamin D2 50,000 unit PO ARGUETA 03/15/17 05/07/17 History





(DRISDOL)]    


 


Ipratropium-Albuterol Nebulize 3 ml INHALATION RT-Q6H PRN 03/15/17 05/07/17 

History





[Duoneb 0.5 mg-3 mg/3 ml Soln]    


 


Melatonin 3 mg PO HS 03/15/17 05/07/17 History


 


Multivitamins, Thera [Multivitamin 1 tab PO DAILY 03/15/17 05/07/17 History





(formulary)]    


 


Omeprazole 40 mg PO DAILY 03/15/17 05/07/17 History


 


amLODIPine [Norvasc] 10 mg PO DAILY 03/15/17 05/07/17 History


 


hydrALAZINE HCL [Apresoline] 75 mg PO BID 03/15/17 05/07/17 History


 


Aspirin 81 mg PO DAILY 04/04/17 05/07/17 History


 


Losartan Potassium [Cozaar] 100 mg PO DAILY 04/04/17 05/07/17 History


 


Potassium Chloride ER [K-Dur 20] 20 meq PO DAILY 04/04/17 05/07/17 History


 


oxyCODONE HCL 20 mg PO Q8H PRN 05/07/17 05/07/17 History











 Allergies











Allergy/AdvReac Type Severity Reaction Status Date / Time


 


diphenhydramine Allergy  Unknown Verified 05/07/17 10:06





[From Benadryl]     


 


morphine Allergy  Rash/Hives Verified 05/07/17 10:06


 


pregabalin [From Lyrica] Allergy  Unknown Verified 05/07/17 10:06


 


Sulfa (Sulfonamide Allergy  Unknown Verified 05/07/17 10:06





Antibiotics)     














Physical Exam


Vitals: 


 Vital Signs











  Temp Pulse Pulse Resp BP BP Pulse Ox


 


 05/07/17 11:11  98 F  114 H   22  106/81   95


 


 05/07/17 10:50  97.0 F L   113 H  20   121/57  94 L








 Intake and Output











 05/06/17 05/07/17 05/07/17





 22:59 06:59 14:59


 


Other:   


 


  Weight   77.111 kg








 Patient Weight











 05/08/17





 06:59


 


Weight 77.111 kg














General appearance: average body habitus, mild distress





- EENT


Eyes: anicteric sclerae, EOMI, PERRLA, no ptosis, no scleral icterus, normal 

appearance


ENT: hard of hearing, normal oropharynx, no thrush


Ears: bilateral: normal





- Neck


Neck: no lymphadenopathy, normal ROM, no rigidity, no stridor, no thyromegaly


Carotids: bilateral: upstroke delayed, bruit present


Thyroid: bilateral: normal size





- Respiratory


Respiratory: bilateral: diminished, rhonchi, wheezing, prolonged expiration, 

negative: dullness, rales





- Cardiovascular


Rhythm: regular


Heart sounds: normal: S1, S2


Abnormal Heart Sounds: systolic murmur, trace bilateral pedal edema





- Gastrointestinal


General gastrointestinal: normal bowel sounds, soft, no tenderness, no 

umbilical hernia, no ventral hernia





- Integumentary


Integumentary: normal, normal turgor





- Neurologic


Neurologic: CNII-XII intact





- Musculoskeletal


Musculoskeletal: generalized weakness, strength equal bilaterally





- Psychiatric


Psychiatric: A&O x's 2, appropriate affect, intact judgment & insight





Results


CBC & Chem 7: 


 05/07/17 08:24





 05/07/17 08:24





Thrombosis Risk Factor Assmnt





- DVT/VTE Prophylaxis


DVT/VTE Prophylaxis: Mechanical Prophylaxis ordered





- Choose All That Apply


Any of the Below Risk Factors Present?: Yes


Each Factor Represents 1 point: Abnormal pulmonary function (COPD), Obesity (

BMI >25), Swollen legs (current)


Other Risk Factors: Yes


Each Risk Factor Represents 2 Points: Age 61-74 years


Thrombosis Risk Factor Assessment Total Risk Factor Score: 5


Thrombosis Risk Factor Assessment Level: High Risk





Assessment and Plan


Plan: 





1.  Metabolic encephalopathy due to hypoxia with shortness of breath and acute 

on chronic diastolic heart failure.  Continue Lasix 40 mg IV every 8 hours, I&O 

and daily weights, cardiology consult, metoprolol 25 mg every 8 hours.





2.  Chronic blood loss anemia secondary to possible GI bleed as well as 

hemoptysis.  Monitor hemoglobin.





3. CAD post-PCI of the LAD.  Continue metoprolol 25 mg orally twice every day, 

Lipitor 40 mg orally once every day, losartan 50 mg orally once every day, and 

metoprolol 25 mg orally twice every day.





4.  History of CVA of the left frontal lobe.resume Eliquis 2.5 mg po bid has 

been on hold due to acute GI bleed and acute blood loss anemia on last 

admission.





5.  Carotid artery disease as well as PAD.  Currently under the care of 

vascular surgery continue Lipitor 40 mg orally once every day for secondary 

prevention.





6.  Hypertension and hypertensive cardiovascular disease.  Continue losartan 50 

mg orally once every day, metoprolol 25 mg orally twice every day, hydralazine 

75 mg orally 2 times every day.





7.  Hyperlipidemia.  Continue Lipitor 40 mg orally once every day.





8.  ALLERGIC rhinitis.  Continue singular 10 mg at bedtime.





9.  Lumbar degenerative disc disease with spondylolisthesis and facet 

arthropathy.  Post epidural injection.





9. Chronic atrial fibrillation. Continue metoprolol 25 mg orally twice every 

day and eliquis has been on hold .





10.  DVT prophylaxis.  Knee-high KEVIN hose as well as SCD's.





11.  GI prophylaxis.  Continue Protonix 40 mg orally once every day along with 

Carafate 1 g orally twice every day.





12.  Patient is full code.





13.  Admit to inpatient.  Estimate length of stay 2 midnights.





14.  Prognosis is guarded.





Impression and plan of care have been directed as dictated by the signing 

physician.  Danielle Herbert nurse practitioner acting as scribe for signing 

physician.

## 2017-05-07 NOTE — ED
General Adult HPI





- General


Chief complaint: Shortness of Breath


Stated complaint: diff breathing


Time Seen by Provider: 05/07/17 07:55


Source: patient, EMS, RN notes reviewed


Mode of arrival: EMS


Limitations: altered mental status





- History of Present Illness


Initial comments: 





This is a 71-year-old female who presents emergency Department because the 

nursing home thought she was having difficulty breathing.  Patient is demented 

and alert and oriented 1 only and this is her baseline so she is of no help 

with the history.  She does not complaining of any pain currently however.  

Staff felt her mental status was baseline but thought she was having some 

significant breathing and satting in the 70s on 2 L which is not normal.  They 

didn't mention any fever history didn't mention any history of cough chest 

pain.  There was no mention of any abdominal pain nausea or vomiting.  There is 

no family member with the patient was staffed with the patient so no further 

history is available





- Related Data


 Home Medications











 Medication  Instructions  Recorded  Confirmed


 


Montelukast Sodium [Singulair] 10 mg PO HS 09/20/14 05/07/17


 


Metoprolol Tartrate [Lopressor] 25 mg PO Q8H 12/05/15 05/07/17


 


Nitroglycerin Sl Tabs [Nitrostat] 0.4 mg SUBLINGUAL Q5M PRN 12/05/15 05/07/17


 


Atorvastatin [Lipitor] 40 mg PO HS 12/06/15 05/07/17


 


Ergocalciferol [Vitamin D2 50,000 unit PO ARGUETA 03/15/17 05/07/17





(DRISDOL)]   


 


Ipratropium-Albuterol Nebulize 3 ml INHALATION RT-Q6H PRN 03/15/17 05/07/17





[Duoneb 0.5 mg-3 mg/3 ml Soln]   


 


Melatonin 3 mg PO HS 03/15/17 05/07/17


 


Multivitamins, Thera [Multivitamin 1 tab PO DAILY 03/15/17 05/07/17





(formulary)]   


 


Omeprazole 40 mg PO DAILY 03/15/17 05/07/17


 


amLODIPine [Norvasc] 10 mg PO DAILY 03/15/17 05/07/17


 


hydrALAZINE HCL [Apresoline] 75 mg PO BID 03/15/17 05/07/17


 


Aspirin 81 mg PO DAILY 04/04/17 05/07/17


 


Losartan Potassium [Cozaar] 100 mg PO DAILY 04/04/17 05/07/17


 


Potassium Chloride ER [K-Dur 20] 20 meq PO DAILY 04/04/17 05/07/17


 


oxyCODONE HCL 20 mg PO Q8H PRN 05/07/17 05/07/17








 Previous Rx's











 Medication  Instructions  Recorded


 


Sucralfate [Carafate] 1 gm PO BID #600 ml 03/20/17


 


Ferrous Sulfate [Iron (65  mg PO W/LUNCH  tab 04/08/17





Elemental)]  


 


Cephalexin [Keflex] 500 mg PO Q8HR #21 cap 04/29/17


 


ALPRAZolam [Xanax] 0.5 mg PO HS #30 tab 05/01/17











 Allergies











Allergy/AdvReac Type Severity Reaction Status Date / Time


 


diphenhydramine Allergy  Unknown Verified 05/07/17 10:06





[From Benadryl]     


 


morphine Allergy  Rash/Hives Verified 05/07/17 10:06


 


pregabalin [From Lyrica] Allergy  Unknown Verified 05/07/17 10:06


 


Sulfa (Sulfonamide Allergy  Unknown Verified 05/07/17 10:06





Antibiotics)     














Review of Systems


ROS Statement: 


Those systems with pertinent positive or pertinent negative responses have been 

documented in the HPI.





ROS Other: All systems not noted in ROS Statement are negative.





Past Medical History


Past Medical History: Atrial Fibrillation, Asthma, Coronary Artery Disease (CAD)

, COPD, CVA/TIA, GERD/Reflux, GI Bleed, Hyperlipidemia, Hypertension, Mitral 

Valve Prolapse (MVP), Musculoskeletal Disorder, Osteoarthritis (OA), 

Respiratory Disorder, Vascular Disorder


Additional Past Medical History / Comment(s): diverticulitits


Last Myocardial Infarction Date:: unknown


History of Any Multi-Drug Resistant Organisms: None Reported


Past Surgical History: Cholecystectomy, Heart Catheterization With Stent, 

Hysterectomy, Orthopedic Surgery


Additional Past Surgical History / Comment(s): carotid, back sx


Past Anesthesia/Blood Transfusion Reactions: No Reported Reaction


Date of Last Stent Placement:: 2011


Past Psychological History: No Psychological Hx Reported


Smoking Status: Current some day smoker


Past Alcohol Use History: None Reported


Past Drug Use History: None Reported





- Past Family History


  ** Son(s)


Family Medical History: No Reported History





  ** Daughter(s)


Family Medical History: No Reported History





  ** Mother


Family Medical History: Congestive Heart Failure (CHF)





  ** Father


Family Medical History: Cancer


Additional Family Medical History / Comment(s): Bone ca





  ** Sister(s)


Family Medical History: Coronary Artery Disease (CAD)





  ** Brother(s)


Family Medical History: Coronary Artery Disease (CAD), Myocardial Infarction (MI

)





General Exam





- General Exam Comments


Initial Comments: 





GENERAL:


Patient is well-developed and well-nourished.  Patient is nontoxic and well-

hydrated and is in mild distress.





ENT:


Neck is soft and supple.  No significant lymphadenopathy is noted.  Oropharynx 

is clear.  Moist mucous membranes.  Neck has full range of motion without 

eliciting any pain.  





EYES:


The sclera were anicteric and conjunctiva were pink and moist.  Extraocular 

movements were intact and pupils were equal round and reactive to light.  

Eyelids were unremarkable.





PULMONARY:


Patient has expiratory wheezing in the bases.  Some crackles in the bases.





CARDIOVASCULAR:


Regular rate and rhythm at 115 beats a minute





ABDOMEN:


Soft and nontender with normal bowel sounds.  No palpable organomegaly was 

noted.  There is no palpable pulsatile mass.





SKIN:


Skin is clear with no lesions or rashes and otherwise unremarkable.





NEUROLOGIC:


Patient is alert and oriented 2 Cranial nerves II through XII are grossly 

intact.  Motor and sensory are also intact.  Normal speech, volume and content.

  Symmetrical smile.





MUSCULOSKELETAL:


Normal extremities with adequate strength and full range of motion.  Patient 

has edema bilaterally





LYMPHATICS:


No significant lymphadenopathy is noted





PSYCHIATRIC:


Normal psychiatric evaluation.  


Limitations: altered mental status





Course


 Vital Signs











  05/07/17 05/07/17 05/07/17





  07:55 08:01 08:44


 


Temperature 98 F  


 


Pulse Rate 100 114 H 114 H


 


Respiratory 24 22 





Rate   


 


Blood Pressure 142/64 138/58 


 


O2 Sat by Pulse 90 L 94 L 





Oximetry   














  05/07/17 05/07/17 05/07/17





  09:00 09:01 09:32


 


Temperature   


 


Pulse Rate 124 H 110 H 114 H


 


Respiratory  22 22





Rate   


 


Blood Pressure  121/58 97/51


 


O2 Sat by Pulse  100 94 L





Oximetry   














  05/07/17





  10:10


 


Temperature 


 


Pulse Rate 109 H


 


Respiratory 20





Rate 


 


Blood Pressure 106/81


 


O2 Sat by Pulse 94 L





Oximetry 














Medical Decision Making





- Medical Decision Making





EKG shows A. fib with rapid ventricular response at 116 bpm QRS is 74 QT 

interval 324 QTC is 450 per patient's EKG shows no ST segment elevation or 

depression or T-wave abdomen is noted.





Patient's chest x-ray shows new pleural effusions cardiomegaly or and some 

cephalization consistent with pulmonary edema.





I started patient Lasix and nitroglycerin paste.  On oxygen the patient 

continued to oxygenating in the 90s





- Lab Data


Result diagrams: 


 05/07/17 08:24





 05/07/17 08:24


 Lab Results











  05/07/17 05/07/17 05/07/17 Range/Units





  08:24 08:24 08:24 


 


WBC   8.9   (3.8-10.6)  k/uL


 


RBC   3.16 L   (3.80-5.40)  m/uL


 


Hgb   8.0 L   (11.4-16.0)  gm/dL


 


Hct   26.0 L   (34.0-46.0)  %


 


MCV   82.4   (80.0-100.0)  fL


 


MCH   25.3   (25.0-35.0)  pg


 


MCHC   30.7 L   (31.0-37.0)  g/dL


 


RDW   20.9 H   (11.5-15.5)  %


 


Plt Count   317  D   (150-450)  k/uL


 


Neutrophils % (Manual)   67.0   %


 


Band Neutrophils %   1.0   %


 


Lymphocytes % (Manual)   15.0   %


 


Monocytes % (Manual)   17.0   %


 


Neutrophils # (Manual)   6.1   (1.3-7.7)  k/uL


 


Lymphocytes # (Manual)   1.3   (1.0-4.8)  k/uL


 


Monocytes # (Manual)   1.5 H   (0-1.0)  k/uL


 


Nucleated RBCs   0   (0-0)  /100 WBC


 


Manual Slide Review   Performed   


 


Toxic Granulation   Present   


 


Polychromasia   Present   


 


Hypochromasia   Marked   


 


Poikilocytosis   Slight   


 


Anisocytosis   Moderate   


 


Microcytosis   Slight   


 


PT     (9.0-12.0)  sec


 


INR     (<1.1)  


 


APTT     (22.0-30.0)  sec


 


Sodium    138  (137-145)  mmol/L


 


Potassium    5.2 H  (3.5-5.1)  mmol/L


 


Chloride    104  ()  mmol/L


 


Carbon Dioxide    27  (22-30)  mmol/L


 


Anion Gap    7  mmol/L


 


BUN    39 H  (7-17)  mg/dL


 


Creatinine    1.44 H  (0.52-1.04)  mg/dL


 


Est GFR (MDRD) Af Amer    43  (>60 ml/min/1.73 sqM)  


 


Est GFR (MDRD) Non-Af    36  (>60 ml/min/1.73 sqM)  


 


Glucose    86  (74-99)  mg/dL


 


Calcium    8.4  (8.4-10.2)  mg/dL


 


Magnesium    2.2  (1.6-2.3)  mg/dL


 


Total Bilirubin    0.7  (0.2-1.3)  mg/dL


 


AST    29  (14-36)  U/L


 


ALT    32  (9-52)  U/L


 


Alkaline Phosphatase    96  ()  U/L


 


Total Creatine Kinase  154 H    ()  U/L


 


CK-MB (CK-2)  1.7    (0.0-2.4)  ng/mL


 


CK-MB (CK-2) Rel Index  1.1    


 


Troponin I  0.015    (0.000-0.034)  ng/mL


 


NT-Pro-B Natriuret Pep     pg/mL


 


Total Protein    5.6 L  (6.3-8.2)  g/dL


 


Albumin    2.5 L  (3.5-5.0)  g/dL


 


Urine Color     


 


Urine Appearance     (Clear)  


 


Urine pH     (5.0-8.0)  


 


Ur Specific Gravity     (1.001-1.035)  


 


Urine Protein     (Negative)  


 


Urine Glucose (UA)     (Negative)  


 


Urine Ketones     (Negative)  


 


Urine Blood     (Negative)  


 


Urine Nitrite     (Negative)  


 


Urine Bilirubin     (Negative)  


 


Urine Urobilinogen     (<2.0)  mg/dL


 


Ur Leukocyte Esterase     (Negative)  














  05/07/17 05/07/17 05/07/17 Range/Units





  08:24 08:24 08:35 


 


WBC     (3.8-10.6)  k/uL


 


RBC     (3.80-5.40)  m/uL


 


Hgb     (11.4-16.0)  gm/dL


 


Hct     (34.0-46.0)  %


 


MCV     (80.0-100.0)  fL


 


MCH     (25.0-35.0)  pg


 


MCHC     (31.0-37.0)  g/dL


 


RDW     (11.5-15.5)  %


 


Plt Count     (150-450)  k/uL


 


Neutrophils % (Manual)     %


 


Band Neutrophils %     %


 


Lymphocytes % (Manual)     %


 


Monocytes % (Manual)     %


 


Neutrophils # (Manual)     (1.3-7.7)  k/uL


 


Lymphocytes # (Manual)     (1.0-4.8)  k/uL


 


Monocytes # (Manual)     (0-1.0)  k/uL


 


Nucleated RBCs     (0-0)  /100 WBC


 


Manual Slide Review     


 


Toxic Granulation     


 


Polychromasia     


 


Hypochromasia     


 


Poikilocytosis     


 


Anisocytosis     


 


Microcytosis     


 


PT   11.8   (9.0-12.0)  sec


 


INR   1.2   (<1.1)  


 


APTT   23.6   (22.0-30.0)  sec


 


Sodium     (137-145)  mmol/L


 


Potassium     (3.5-5.1)  mmol/L


 


Chloride     ()  mmol/L


 


Carbon Dioxide     (22-30)  mmol/L


 


Anion Gap     mmol/L


 


BUN     (7-17)  mg/dL


 


Creatinine     (0.52-1.04)  mg/dL


 


Est GFR (MDRD) Af Amer     (>60 ml/min/1.73 sqM)  


 


Est GFR (MDRD) Non-Af     (>60 ml/min/1.73 sqM)  


 


Glucose     (74-99)  mg/dL


 


Calcium     (8.4-10.2)  mg/dL


 


Magnesium     (1.6-2.3)  mg/dL


 


Total Bilirubin     (0.2-1.3)  mg/dL


 


AST     (14-36)  U/L


 


ALT     (9-52)  U/L


 


Alkaline Phosphatase     ()  U/L


 


Total Creatine Kinase     ()  U/L


 


CK-MB (CK-2)     (0.0-2.4)  ng/mL


 


CK-MB (CK-2) Rel Index     


 


Troponin I     (0.000-0.034)  ng/mL


 


NT-Pro-B Natriuret Pep  5480    pg/mL


 


Total Protein     (6.3-8.2)  g/dL


 


Albumin     (3.5-5.0)  g/dL


 


Urine Color    Yellow  


 


Urine Appearance    Clear  (Clear)  


 


Urine pH    5.5  (5.0-8.0)  


 


Ur Specific Gravity    1.012  (1.001-1.035)  


 


Urine Protein    Trace H  (Negative)  


 


Urine Glucose (UA)    Negative  (Negative)  


 


Urine Ketones    Negative  (Negative)  


 


Urine Blood    Negative  (Negative)  


 


Urine Nitrite    Negative  (Negative)  


 


Urine Bilirubin    Negative  (Negative)  


 


Urine Urobilinogen    <2.0  (<2.0)  mg/dL


 


Ur Leukocyte Esterase    Negative  (Negative)  














Disposition


Clinical Impression: 


 Acute pulmonary edema





Disposition: ADMITTED AS IP TO THIS [facility]


Time of Disposition: 10:24

## 2017-05-08 VITALS — RESPIRATION RATE: 18 BRPM

## 2017-05-08 LAB
ALP SERPL-CCNC: 102 U/L (ref 38–126)
ALT SERPL-CCNC: 37 U/L (ref 9–52)
ANION GAP SERPL CALC-SCNC: 10 MMOL/L
AST SERPL-CCNC: 39 U/L (ref 14–36)
BUN SERPL-SCNC: 43 MG/DL (ref 7–17)
CALCIUM SPEC-MCNC: 8.6 MG/DL (ref 8.4–10.2)
CH: 24.7
CHCM: 29.6
CHLORIDE SERPL-SCNC: 103 MMOL/L (ref 98–107)
CO2 SERPL-SCNC: 27 MMOL/L (ref 22–30)
ERYTHROCYTE [DISTWIDTH] IN BLOOD BY AUTOMATED COUNT: 2.95 M/UL (ref 3.8–5.4)
ERYTHROCYTE [DISTWIDTH] IN BLOOD: 21 % (ref 11.5–15.5)
GLUCOSE SERPL-MCNC: 143 MG/DL (ref 74–99)
HCT VFR BLD AUTO: 24.6 % (ref 34–46)
HDW: 3.57
HGB BLD-MCNC: 7.2 GM/DL (ref 11.4–16)
MCH RBC QN AUTO: 24.3 PG (ref 25–35)
MCHC RBC AUTO-ENTMCNC: 29.1 G/DL (ref 31–37)
MCV RBC AUTO: 83.6 FL (ref 80–100)
NON-AFRICAN AMERICAN GFR(MDRD): 43
POTASSIUM SERPL-SCNC: 4.8 MMOL/L (ref 3.5–5.1)
PROT SERPL-MCNC: 5.7 G/DL (ref 6.3–8.2)
SODIUM SERPL-SCNC: 140 MMOL/L (ref 137–145)
WBC # BLD AUTO: 7.9 K/UL (ref 3.8–10.6)

## 2017-05-08 PROCEDURE — 30233N1 TRANSFUSION OF NONAUTOLOGOUS RED BLOOD CELLS INTO PERIPHERAL VEIN, PERCUTANEOUS APPROACH: ICD-10-PCS

## 2017-05-08 RX ADMIN — NITROGLYCERIN SCH: 20 OINTMENT TOPICAL at 08:48

## 2017-05-08 RX ADMIN — Medication SCH MG: at 08:47

## 2017-05-08 RX ADMIN — FUROSEMIDE SCH MG: 10 INJECTION, SOLUTION INTRAMUSCULAR; INTRAVENOUS at 23:15

## 2017-05-08 RX ADMIN — THERA TABS SCH EACH: TAB at 08:47

## 2017-05-08 RX ADMIN — ASPIRIN 81 MG CHEWABLE TABLET SCH MG: 81 TABLET CHEWABLE at 08:46

## 2017-05-08 RX ADMIN — PANTOPRAZOLE SODIUM SCH MG: 40 INJECTION, POWDER, FOR SOLUTION INTRAVENOUS at 08:47

## 2017-05-08 RX ADMIN — MONTELUKAST SODIUM SCH MG: 10 TABLET, FILM COATED ORAL at 19:55

## 2017-05-08 RX ADMIN — ATORVASTATIN CALCIUM SCH MG: 40 TABLET, FILM COATED ORAL at 19:54

## 2017-05-08 RX ADMIN — FUROSEMIDE SCH MG: 10 INJECTION, SOLUTION INTRAMUSCULAR; INTRAVENOUS at 16:54

## 2017-05-08 RX ADMIN — SUCRALFATE SCH GM: 1 TABLET ORAL at 08:48

## 2017-05-08 RX ADMIN — LOSARTAN POTASSIUM SCH MG: 50 TABLET, FILM COATED ORAL at 08:47

## 2017-05-08 RX ADMIN — NITROGLYCERIN SCH: 20 OINTMENT TOPICAL at 16:57

## 2017-05-08 RX ADMIN — SUCRALFATE SCH GM: 1 TABLET ORAL at 19:55

## 2017-05-08 RX ADMIN — METOPROLOL TARTRATE SCH MG: 25 TABLET, FILM COATED ORAL at 08:47

## 2017-05-08 RX ADMIN — Medication SCH MG: at 19:55

## 2017-05-08 RX ADMIN — PANTOPRAZOLE SODIUM SCH MG: 40 INJECTION, POWDER, FOR SOLUTION INTRAVENOUS at 19:55

## 2017-05-08 RX ADMIN — METOPROLOL TARTRATE SCH MG: 25 TABLET, FILM COATED ORAL at 16:54

## 2017-05-08 RX ADMIN — FUROSEMIDE SCH MG: 10 INJECTION, SOLUTION INTRAMUSCULAR; INTRAVENOUS at 08:47

## 2017-05-08 RX ADMIN — NITROGLYCERIN SCH INCH: 20 OINTMENT TOPICAL at 23:15

## 2017-05-08 RX ADMIN — METOPROLOL TARTRATE SCH MG: 25 TABLET, FILM COATED ORAL at 23:14

## 2017-05-08 RX ADMIN — NITROGLYCERIN SCH: 20 OINTMENT TOPICAL at 14:28

## 2017-05-08 NOTE — CONS
DATE OF CONSULTATION:  2017



REASON FOR CONSULTATION: 

1. Congestive heart failure. 

2. Atrial fibrillation. 



HISTORY OF PRESENT ILLNESS: This is a pleasant 71-year-old  

female patient who sees Dr. Mccord in the office as an outpatient with 

a known history of coronary artery disease and prior stenting of the 

LAD, chronic atrial fibrillation, hypertension, dyslipidemia as well 

as multiple comorbid conditions, was brought from an extended-care 

facility to the hospital. After she was transferred from extended-care 

facility to the hospital for further evaluation and management of 

shortness of breath.  



The patient just was admitted to the hospital a few months ago with 

acute on chronic respiratory failure secondary to congestive heart 

failure exacerbation.  



This time, the patient was at an extended-care facility, where she was 

found to be short of breath as well as she found to be hypoxic. Beside 

that, the patient was slightly tachycardic.  



The chest x-ray at the hospital showed findings consistent with 

bilateral pneumonia as well as congestive heart failure. The BNP was 

checked and came in to be more than 5000.  



The patient was initiated on Lasix IV and she was admitted to the 

sixth floor.  



There is no indication that the patient developed any chest pain or 

chest discomfort.  



Currently the patient seems to be slightly lethargic and does not seem 

to be in acute respiratory distress. She is in atrial fibrillation 

with controlled heart rate overall.  



Past medical history includes: 

1. Coronary artery disease and prior LAD stenting. 

2. Chronic atrial fibrillation. 

3. Systemic hypertension. 

4. Carotid artery disease. 

5. Dyslipidemia. 

6. Multiple comorbid conditions. 



Current medications include the followin. Amlodipine 10 mg p.o. daily. 

2. Aspirin 81 mg p.o. daily. 

3. Atorvastatin 40 mg p.o. q.h.s. 

4. Ferrous sulfate 325 mg p.o. daily. 

5. Lasix 40 mg IV q.8 hours. 

6. Hydralazine 75 mg p.o. b.i.d. 

7. Losartan 100 mg p.o. daily. 

8. Metoprolol 25 mg q.8 hours. 

9. Singular 10 mg p.o. daily. 

10. Protonix 40 mg IV daily. 

11. Carafate 1 (      ) p.o. b.i.d. 



PHYSICAL EXAMINATION: 

GENERAL APPEARANCE: The patient does seem to be slightly lethargic. 

Her vitals showed the following: Temp is 98.0, heart rate 99, 

respiratory rate is 20, pressure is 137/73 mmHg.  

Cardiovascular examination shows irregularly irregular rhythm. 

Respiratory examination showed diminished breathing sounds bilaterally. 



DIAGNOSTIC WORKUP: The EKG showed atrial fibrillation with controlled 

heart rate and Q wave in the anteroseptal leads.  



Blood work showed WBC 8.9, hemoglobin 8.0, platelet is 317. Sodium 

138, potassium 5.2, BUN 39, creatinine 1.44.  



ASSESSMENT: 

1. Acute on chronic respiratory failure secondary to congestive heart 

failure exacerbation.  

2. Possible pneumonia. 

3. Coronary artery disease and prior LAD stenting. 

4. Chronic atrial fibrillation with controlled heart rate. 

5. Anemia, which seems to be chronic. 

6. Multiple comorbid conditions including peripheral arterial disease 

and carotid arterial disease.  



PLAN: 

1. The patient was initiated on Lasix IV. 

2. We will continue to monitor her kidney function and electrolytes. 

3. The heart rate seems to be well controlled on the current above 

dose of metoprolol.  

4. Overall, I do not feel that the patient is a candidate for 

anticoagulation. Beside that, she is anemic with a hemoglobin around 

8.  

5. Will continue following up with her.

## 2017-05-08 NOTE — P.PN
Subjective





This is a 77-year-old  female patient of Dr. Hood with a previous 

medical history significant for CAD post-PCI of the LAD, hypertension and 

hypertensive cardio vascular disease with left ventricular hypertrophy, 

hyperlipidemia, history of the for arterial occlusive disease, carotid artery 

disease status post right carotid endarterectomy with about 70% stenosis of 

bilateral carotid arteries, has been under the care of cardiology, pulmonary 

medicine, vascular surgery, gastroenterology, she was recently hospitalized at 

[facility] in 03/16/2017 after she was admitted for acute respiratory 

failure secondary to acute diastolic heart failure and was seen and evaluated 

by pulmonary and cardiology along with gastroenterology at that time she was 

given 2 units of packed red blood cells and the patient was sent home , 

subsequently she followed with me as an outpatient, patient developed to have a 

significant anemia with significant GI bleed at that time she was sent to the 

ER in 04/08/2017 she was treated there and the patient was sent home however he 

was recommended for the patient to go back on her Eliquis despite the fact that 

she has been anemic and has been having some blood loss with recurrent 

transfusion, patient was very weak to day after she had a bowel movement and 

stated that there is blood in stool ,so her son Ivan brought her to the 

emergency department at [facility] 4/29/17 with mental status changes 

she was found to have a urinary tract infection as well as encephalopathy at 

the same time stated that she has been taking her Eliquis at this time on the 

recommendations of her cardiologist, patient did have a hemoglobin of 9.9, she 

did have a urinary tract infection and she shows some signs of encephalopathy 

thought to be due to pain medication. She was seen by cardiology with 

recommendations to resume eliquis and aspirin. Hemoglobin dropped to 7.7. 

Aspirin and eliquis were held.  As seen by Dr. Mejia recommended a double 

balloon enteroscopy in the future should anticoagulation be necessary.





She now presents from the nursing home with shortness of breath and decreased 

pulse ox as well as tachypnea and tachycardia with trace pedal edema.  BNP was 

5400.  Chest x-ray showed new patchy opacities in the right midlung and right 

lower lung suspicious for multifocal pneumonia superimposed upon chronic 

interstitial lung disease.  Chronic interstitial prominence may be related to 

interstitial pulmonary edema and background of chronic interstitial lung 

disease.  Her hemoglobin was 8.  She was in atrial fibrillation with rapid 

ventricular response below 110s.  BUN 39 and creatinine 1.44.  She was started 

on IV Lasix, nebulizer treatments and consult requested with cardiology. 





5/8: Patient has been seen by cardiology with recommendations to continue 

current dose of IV Lasix of 40 mg every 8 hours.  BUN is 43 and creatinine 

1.24.  Hemoglobin is 7.2.  One unit of packed RBCs will be ordered.  Patient is 

anxious to be discharged home.  She is agreeable to stay until tomorrow.








Objective





- Vital Signs


Vital signs: 


 Vital Signs











Temp  96.9 F L  05/08/17 11:20


 


Pulse  98   05/08/17 11:33


 


Resp  20   05/08/17 11:20


 


BP  136/71   05/08/17 11:20


 


Pulse Ox  100   05/08/17 11:20








 Intake & Output











 05/07/17 05/08/17 05/08/17





 18:59 06:59 18:59


 


Intake Total 180  540


 


Output Total 500 500 


 


Balance -320 -500 540


 


Weight 77.111 kg 69.5 kg 69.5 kg


 


Intake:   


 


  Oral 180  540


 


Output:   


 


  Urine 500 500 


 


Other:   


 


  Voiding Method  Toilet Toilet


 


  # Voids 2 2 














- Exam





General appearance: average body habitus, mild distress





- EENT


Eyes: anicteric sclerae, EOMI, PERRLA, no ptosis, no scleral icterus, normal 

appearance


ENT: hard of hearing, normal oropharynx, no thrush


Ears: bilateral: normal





- Neck


Neck: no lymphadenopathy, normal ROM, no rigidity, no stridor, no thyromegaly


Carotids: bilateral: upstroke delayed, bruit present


Thyroid: bilateral: normal size





- Respiratory


Respiratory: bilateral: diminished, rhonchi, wheezing, prolonged expiration, 

negative: dullness, rales





- Cardiovascular


Rhythm: regular


Heart sounds: normal: S1, S2


Abnormal Heart Sounds: systolic murmur, trace bilateral pedal edema





- Gastrointestinal


General gastrointestinal: normal bowel sounds, soft, no tenderness, no 

umbilical hernia, no ventral hernia





- Integumentary


Integumentary: normal, normal turgor





- Neurologic


Neurologic: CNII-XII intact





- Musculoskeletal


Musculoskeletal: generalized weakness, strength equal bilaterally





- Psychiatric


Psychiatric: A&O x's 2, appropriate affect, intact judgment & insight





- Labs


CBC & Chem 7: 


 05/08/17 05:40





 05/08/17 05:37


Labs: 


 Abnormal Lab Results - Last 24 Hours (Table)











  05/08/17 05/08/17 Range/Units





  05:37 05:40 


 


RBC   2.95 L  (3.80-5.40)  m/uL


 


Hgb   7.2 L  (11.4-16.0)  gm/dL


 


Hct   24.6 L  (34.0-46.0)  %


 


MCH   24.3 L  (25.0-35.0)  pg


 


MCHC   29.1 L  (31.0-37.0)  g/dL


 


RDW   21.0 H  (11.5-15.5)  %


 


BUN  43 H   (7-17)  mg/dL


 


Creatinine  1.24 H   (0.52-1.04)  mg/dL


 


Glucose  143 H   (74-99)  mg/dL


 


AST  39 H   (14-36)  U/L


 


Total Protein  5.7 L   (6.3-8.2)  g/dL


 


Albumin  2.6 L   (3.5-5.0)  g/dL














Assessment and Plan


Plan: 





1.  Metabolic encephalopathy due to hypoxia with shortness of breath and acute 

on chronic diastolic heart failure.  Continue Lasix 40 mg IV every 8 hours, I&O 

and daily weights, cardiology consult, metoprolol 25 mg every 8 hours.





2.  Chronic blood loss anemia secondary to possible GI bleed as well as 

hemoptysis.  Monitor hemoglobin.  Transfuse 1 unit of packed RBCs.





3. CAD post-PCI of the LAD.  Continue metoprolol 25 mg orally twice every day, 

Lipitor 40 mg orally once every day, losartan 50 mg orally once every day, and 

metoprolol 25 mg orally twice every day.





4.  History of CVA of the left frontal lobe.resume Eliquis 2.5 mg po bid has 

been on hold due to acute GI bleed and acute blood loss anemia on last 

admission.





5.  Carotid artery disease as well as PAD.  Currently under the care of 

vascular surgery continue Lipitor 40 mg orally once every day for secondary 

prevention.





6.  Hypertension and hypertensive cardiovascular disease.  Continue losartan 50 

mg orally once every day, metoprolol 25 mg orally twice every day, hydralazine 

75 mg orally 2 times every day.





7.  Hyperlipidemia.  Continue Lipitor 40 mg orally once every day.





8.  ALLERGIC rhinitis.  Continue singular 10 mg at bedtime.





9.  Lumbar degenerative disc disease with spondylolisthesis and facet 

arthropathy.  Post epidural injection.





9. Chronic atrial fibrillation. Continue metoprolol 25 mg orally twice every 

day and eliquis has been on hold .





10.  DVT prophylaxis.  Knee-high KEVIN hose as well as SCD's.





11.  GI prophylaxis.  Continue Protonix 40 mg orally once every day along with 

Carafate 1 g orally twice every day.





12.  Patient is full code.





13.  Admit to inpatient.  Estimate length of stay 2 midnights.





14.  Discharge plan: Return to [facility] on Tuesday.





Impression and plan of care have been directed as dictated by the signing 

physician.  Danielle Herbert nurse practitioner acting as scribe for signing 

physician.

## 2017-05-08 NOTE — P.PN
Subjective


Principal diagnosis: 





Congestive heart failure


This is a pleasant 71-year-old  female who follows with Dr. Mccord in 

the office.  She has a known history of coronary artery disease with prior LAD 

stenting, chronic atrial fibrillation, hypertension, hyperlipidemia, PAD, 

patient admitted on anticoagulation in the past, had significant GI bleeding 

and for this reason is not currently on anticoagulation.  She presented to the 

hospital on this occasion from the nursing home with symptoms of shortness of 

breath as well as tachycardia.  BNP level on admission 5400.  She was initiated 

on IV Lasix.  Patient overall has been diuresing well, her weight is down 

today.  Hemoglobin 7.3 today, platelet count 337.  Potassium 4.8.  He went 43, 

creatinine 1.2.  Patient continues to be on IV Lasix.








Objective





- Vital Signs


Vital signs: 


 Vital Signs











Temp  96.9 F L  05/08/17 11:20


 


Pulse  98   05/08/17 11:33


 


Resp  20   05/08/17 11:20


 


BP  136/71   05/08/17 11:20


 


Pulse Ox  100   05/08/17 11:20








 Intake & Output











 05/07/17 05/08/17 05/08/17





 18:59 06:59 18:59


 


Intake Total 180  540


 


Output Total 500 500 


 


Balance -320 -500 540


 


Weight 77.111 kg 69.5 kg 69.5 kg


 


Intake:   


 


  Oral 180  540


 


Output:   


 


  Urine 500 500 


 


Other:   


 


  Voiding Method  Toilet Toilet


 


  # Voids 2 2 














- Exam


PHYSICAL EXAMINATION: 





HEENT: Head is atraumatic, normocephalic.  Pupils equal, round.  Neck is 

supple.  There is no elevated jugular venous pressure.





HEART EXAMINATION: Heart S1 and S2 irregularly irregular systolic murmur is 

heard





CHEST EXAMINATION: His reveal diminished air entry to bilateral bases with 

rales to the bases.





ABDOMEN:  Soft, nontender. Bowel sounds are heard. No organomegaly noted.


 


EXTREMITIES: 2+ peripheral pulses with trace  evidence of peripheral edema and 

no calf tenderness noted.





NEUROLOGIC patient is awake, alert and oriented -3.


 


.


 











- Labs


CBC & Chem 7: 


 05/08/17 05:40





 05/08/17 05:37


Labs: 


 Abnormal Lab Results - Last 24 Hours (Table)











  05/08/17 05/08/17 Range/Units





  05:37 05:40 


 


RBC   2.95 L  (3.80-5.40)  m/uL


 


Hgb   7.2 L  (11.4-16.0)  gm/dL


 


Hct   24.6 L  (34.0-46.0)  %


 


MCH   24.3 L  (25.0-35.0)  pg


 


MCHC   29.1 L  (31.0-37.0)  g/dL


 


RDW   21.0 H  (11.5-15.5)  %


 


BUN  43 H   (7-17)  mg/dL


 


Creatinine  1.24 H   (0.52-1.04)  mg/dL


 


Glucose  143 H   (74-99)  mg/dL


 


AST  39 H   (14-36)  U/L


 


Total Protein  5.7 L   (6.3-8.2)  g/dL


 


Albumin  2.6 L   (3.5-5.0)  g/dL














Assessment and Plan


(1) Diastolic CHF, acute on chronic


Status: Acute   





(2) History of CVA (cerebrovascular accident)


Status: Acute   





(3) CAD (coronary artery disease)


Status: Acute   





(4) Presence of stent in LAD coronary artery


Status: Acute   





(5) Metabolic encephalopathy


Status: Acute   





(6) Chronic blood loss anemia


Status: Acute   





(7) HTN (hypertension)


Status: Acute   





(8) Hyperlipemia


Status: Acute   





(9) Chronic a-fib


Status: Acute   


Plan: 


Cardiology's perspective, we will recommend to continue current dose of IV Lasix

, monitor daily lytes BUN and creatinine along with intake and output and daily 

weights.  Patient is not a candidate for anticoagulation because of history of 

GI bleeding.








DNP note has been reviewed, I agree with a documented findings and plan of 

care.  Patient was seen and examined.

## 2017-05-09 LAB
ANION GAP SERPL CALC-SCNC: 13 MMOL/L
BUN SERPL-SCNC: 48 MG/DL (ref 7–17)
CALCIUM SPEC-MCNC: 8.9 MG/DL (ref 8.4–10.2)
CH: 25.6
CHCM: 31.2
CHLORIDE SERPL-SCNC: 99 MMOL/L (ref 98–107)
CO2 SERPL-SCNC: 31 MMOL/L (ref 22–30)
ERYTHROCYTE [DISTWIDTH] IN BLOOD BY AUTOMATED COUNT: 3.71 M/UL (ref 3.8–5.4)
ERYTHROCYTE [DISTWIDTH] IN BLOOD: 20.7 % (ref 11.5–15.5)
GLUCOSE SERPL-MCNC: 137 MG/DL (ref 74–99)
HCT VFR BLD AUTO: 30.6 % (ref 34–46)
HDW: 4.15
HGB BLD-MCNC: 9.6 GM/DL (ref 11.4–16)
MAGNESIUM SPEC-SCNC: 1.8 MG/DL (ref 1.6–2.3)
MCH RBC QN AUTO: 25.9 PG (ref 25–35)
MCHC RBC AUTO-ENTMCNC: 31.4 G/DL (ref 31–37)
MCV RBC AUTO: 82.5 FL (ref 80–100)
NON-AFRICAN AMERICAN GFR(MDRD): 44
POTASSIUM SERPL-SCNC: 3.8 MMOL/L (ref 3.5–5.1)
SODIUM SERPL-SCNC: 143 MMOL/L (ref 137–145)
WBC # BLD AUTO: 13.1 K/UL (ref 3.8–10.6)

## 2017-05-09 RX ADMIN — FUROSEMIDE SCH MG: 20 TABLET ORAL at 16:23

## 2017-05-09 RX ADMIN — NITROGLYCERIN SCH INCH: 20 OINTMENT TOPICAL at 17:57

## 2017-05-09 RX ADMIN — ASPIRIN 81 MG CHEWABLE TABLET SCH MG: 81 TABLET CHEWABLE at 08:56

## 2017-05-09 RX ADMIN — METOPROLOL TARTRATE SCH MG: 25 TABLET, FILM COATED ORAL at 08:56

## 2017-05-09 RX ADMIN — MONTELUKAST SODIUM SCH MG: 10 TABLET, FILM COATED ORAL at 21:56

## 2017-05-09 RX ADMIN — SUCRALFATE SCH GM: 1 TABLET ORAL at 21:56

## 2017-05-09 RX ADMIN — POTASSIUM CHLORIDE SCH MEQ: 20 TABLET, EXTENDED RELEASE ORAL at 12:17

## 2017-05-09 RX ADMIN — MAGNESIUM SULFATE IN DEXTROSE SCH MLS/HR: 10 INJECTION, SOLUTION INTRAVENOUS at 12:18

## 2017-05-09 RX ADMIN — NITROGLYCERIN SCH INCH: 20 OINTMENT TOPICAL at 13:30

## 2017-05-09 RX ADMIN — SUCRALFATE SCH GM: 1 TABLET ORAL at 08:56

## 2017-05-09 RX ADMIN — POTASSIUM CHLORIDE SCH MEQ: 20 TABLET, EXTENDED RELEASE ORAL at 10:32

## 2017-05-09 RX ADMIN — MAGNESIUM SULFATE IN DEXTROSE SCH MLS/HR: 10 INJECTION, SOLUTION INTRAVENOUS at 10:32

## 2017-05-09 RX ADMIN — FUROSEMIDE SCH: 20 TABLET ORAL at 09:39

## 2017-05-09 RX ADMIN — THERA TABS SCH EACH: TAB at 12:17

## 2017-05-09 RX ADMIN — METOPROLOL TARTRATE SCH MG: 25 TABLET, FILM COATED ORAL at 16:22

## 2017-05-09 RX ADMIN — Medication SCH MG: at 12:17

## 2017-05-09 RX ADMIN — NITROGLYCERIN SCH INCH: 20 OINTMENT TOPICAL at 08:56

## 2017-05-09 RX ADMIN — PANTOPRAZOLE SODIUM SCH MG: 40 INJECTION, POWDER, FOR SOLUTION INTRAVENOUS at 08:56

## 2017-05-09 RX ADMIN — Medication SCH MG: at 21:56

## 2017-05-09 RX ADMIN — ATORVASTATIN CALCIUM SCH MG: 40 TABLET, FILM COATED ORAL at 21:56

## 2017-05-09 RX ADMIN — NITROGLYCERIN SCH INCH: 20 OINTMENT TOPICAL at 22:00

## 2017-05-09 RX ADMIN — PANTOPRAZOLE SODIUM SCH MG: 40 TABLET, DELAYED RELEASE ORAL at 17:57

## 2017-05-09 RX ADMIN — FUROSEMIDE SCH MG: 10 INJECTION, SOLUTION INTRAMUSCULAR; INTRAVENOUS at 08:56

## 2017-05-09 RX ADMIN — LOSARTAN POTASSIUM SCH MG: 50 TABLET, FILM COATED ORAL at 08:56

## 2017-05-09 RX ADMIN — METOPROLOL TARTRATE SCH MG: 25 TABLET, FILM COATED ORAL at 21:57

## 2017-05-09 NOTE — P.PN
Subjective


Principal diagnosis: 





Congestive heart failure


This is a pleasant 71-year-old  female who follows with Dr. Mccord in 

the office.  She has a known history of coronary artery disease with prior LAD 

stenting, chronic atrial fibrillation, hypertension, hyperlipidemia, PAD, 

patient admitted on anticoagulation in the past, had significant GI bleeding 

and for this reason is not currently on anticoagulation.  She presented to the 

hospital on this occasion from the nursing home with symptoms of shortness of 

breath as well as tachycardia.  BNP level on admission 5400.  She diuresed well 

on IV Lasix, currently on by mouth Lasix.  Patient was noted to have a run of 

nonsustained ventricular tachycardia.  Potassium 3.8 today magnesium level I.8.





Objective





- Vital Signs


Vital signs: 


 Vital Signs











Temp  97.8 F   05/09/17 12:00


 


Pulse  93   05/09/17 12:00


 


Resp  18   05/09/17 12:00


 


BP  120/59   05/09/17 12:00


 


Pulse Ox  93 L  05/09/17 12:00








 Intake & Output











 05/08/17 05/09/17 05/09/17





 18:59 06:59 18:59


 


Intake Total 2040 300 320


 


Output Total  2100 


 


Balance 2040 -1800 320


 


Weight 69.5 kg 66.4 kg 


 


Intake:   


 


   0 200


 


    Magnesium Sulfate-D5w Pmx   200





    1 gm In Dextrose/Water 1   





    100ml.bag @ 100 mls/hr   





    IVPB Q1H [facility] Rx#:   





    [id number]   


 


    PRBC 310 0 


 


  Oral 1420 300 120


 


  Blood Product 310  


 


    Rc As-1  Unit 310  





    [id number]   


 


Output:   


 


  Urine  2100 


 


Other:   


 


  Voiding Method Toilet Toilet 


 


  # Voids 4  2


 


  # Bowel Movements   1














- Exam


PHYSICAL EXAMINATION: 





HEENT: Head is atraumatic, normocephalic.  Pupils equal, round.  Neck is 

supple.  There is no elevated jugular venous pressure.





HEART EXAMINATION: Heart S1 and S2 irregularly irregular systolic murmur is 

heard





CHEST EXAMINATION: His reveal diminished air entry to bilateral bases with 

rales to the bases.





ABDOMEN:  Soft, nontender. Bowel sounds are heard. No organomegaly noted.


 


EXTREMITIES: 2+ peripheral pulses with trace  evidence of peripheral edema and 

no calf tenderness noted.





NEUROLOGIC patient is awake, alert and oriented -3.


 


.


 











- Labs


CBC & Chem 7: 


 05/09/17 08:33





 05/09/17 08:30


Labs: 


 Abnormal Lab Results - Last 24 Hours (Table)











  05/08/17 05/09/17 05/09/17 Range/Units





  11:37 08:30 08:33 


 


WBC    13.1 H  (3.8-10.6)  k/uL


 


RBC    3.71 L  (3.80-5.40)  m/uL


 


Hgb    9.6 L D  (11.4-16.0)  gm/dL


 


Hct    30.6 L  (34.0-46.0)  %


 


RDW    20.7 H  (11.5-15.5)  %


 


Carbon Dioxide   31 H   (22-30)  mmol/L


 


BUN   48 H   (7-17)  mg/dL


 


Creatinine   1.20 H   (0.52-1.04)  mg/dL


 


Glucose   137 H   (74-99)  mg/dL


 


Crossmatch  See Detail    














Assessment and Plan


(1) Diastolic CHF, acute on chronic


Status: Acute   





(2) History of CVA (cerebrovascular accident)


Status: Acute   





(3) CAD (coronary artery disease)


Status: Acute   





(4) Presence of stent in LAD coronary artery


Status: Acute   





(5) Metabolic encephalopathy


Status: Acute   





(6) Chronic blood loss anemia


Status: Acute   





(7) HTN (hypertension)


Status: Acute   





(8) Hyperlipemia


Status: Acute   





(9) Chronic a-fib


Status: Acute   


Plan: 


Cardiology's perspective, we'll replace the patient's magnesium and potassium.  

Plan for possible discharge home in the morning if stable.





DNP note has been reviewed, I agree with a documented findings and plan of 

care.  Patient was seen and examined.

## 2017-05-09 NOTE — P.PN
Subjective





This is a 77-year-old  female patient of Dr. Hood with a previous 

medical history significant for CAD post-PCI of the LAD, hypertension and 

hypertensive cardio vascular disease with left ventricular hypertrophy, 

hyperlipidemia, history of the for arterial occlusive disease, carotid artery 

disease status post right carotid endarterectomy with about 70% stenosis of 

bilateral carotid arteries, has been under the care of cardiology, pulmonary 

medicine, vascular surgery, gastroenterology, she was recently hospitalized at 

[facility] in 03/16/2017 after she was admitted for acute respiratory 

failure secondary to acute diastolic heart failure and was seen and evaluated 

by pulmonary and cardiology along with gastroenterology at that time she was 

given 2 units of packed red blood cells and the patient was sent home , 

subsequently she followed with me as an outpatient, patient developed to have a 

significant anemia with significant GI bleed at that time she was sent to the 

ER in 04/08/2017 she was treated there and the patient was sent home however he 

was recommended for the patient to go back on her Eliquis despite the fact that 

she has been anemic and has been having some blood loss with recurrent 

transfusion, patient was very weak to day after she had a bowel movement and 

stated that there is blood in stool ,so her son Ivan brought her to the 

emergency department at [facility] 4/29/17 with mental status changes 

she was found to have a urinary tract infection as well as encephalopathy at 

the same time stated that she has been taking her Eliquis at this time on the 

recommendations of her cardiologist, patient did have a hemoglobin of 9.9, she 

did have a urinary tract infection and she shows some signs of encephalopathy 

thought to be due to pain medication. She was seen by cardiology with 

recommendations to resume eliquis and aspirin. Hemoglobin dropped to 7.7. 

Aspirin and eliquis were held.  As seen by Dr. Mejia recommended a double 

balloon enteroscopy in the future should anticoagulation be necessary.





She now presents from the nursing home with shortness of breath and decreased 

pulse ox as well as tachypnea and tachycardia with trace pedal edema.  BNP was 

5400.  Chest x-ray showed new patchy opacities in the right midlung and right 

lower lung suspicious for multifocal pneumonia superimposed upon chronic 

interstitial lung disease.  Chronic interstitial prominence may be related to 

interstitial pulmonary edema and background of chronic interstitial lung 

disease.  Her hemoglobin was 8.  She was in atrial fibrillation with rapid 

ventricular response below 110s.  BUN 39 and creatinine 1.44.  She was started 

on IV Lasix, nebulizer treatments and consult requested with cardiology. 





5/8: Patient has been seen by cardiology with recommendations to continue 

current dose of IV Lasix of 40 mg every 8 hours.  BUN is 43 and creatinine 

1.24.  Hemoglobin is 7.2.  One unit of packed RBCs will be ordered.  Patient is 

anxious to be discharged home.  She is agreeable to stay until tomorrow.





5/9: Patient did have a run of nonsustained ventricular tachycardia.  She is 

followed by cardiology.  Lasix IV changed to oral 60 mg twice daily.  Repeat 

hemoglobin is 9.6 after 2 units of packed RBCs.  BUN 48 creatinine 1.20.  She 

has been afebrile.  Blood culture showing no growth.  Patient will be monitored 

overnight and plan for discharge to nursing home tomorrow.





Objective





- Vital Signs


Vital signs: 


 Vital Signs











Temp  97.8 F   05/09/17 12:00


 


Pulse  93   05/09/17 12:00


 


Resp  18   05/09/17 12:00


 


BP  120/59   05/09/17 12:00


 


Pulse Ox  93 L  05/09/17 12:00








 Intake & Output











 05/08/17 05/09/17 05/09/17





 18:59 06:59 18:59


 


Intake Total 2040 300 320


 


Output Total  2100 


 


Balance 2040 -1800 320


 


Weight 69.5 kg 66.4 kg 


 


Intake:   


 


   0 200


 


    Magnesium Sulfate-D5w Pmx   200





    1 gm In Dextrose/Water 1   





    100ml.bag @ 100 mls/hr   





    IVPB Q1H [facility] Rx#:   





    [id number]   


 


    PRBC 310 0 


 


  Oral 1420 300 120


 


  Blood Product 310  


 


    Rc As-1  Unit 310  





    [id number]   


 


Output:   


 


  Urine  2100 


 


Other:   


 


  Voiding Method Toilet Toilet 


 


  # Voids 4  2


 


  # Bowel Movements   1














- Exam





General appearance: average body habitus, mild distress





- EENT


Eyes: anicteric sclerae, EOMI, PERRLA, no ptosis, no scleral icterus, normal 

appearance


ENT: hard of hearing, normal oropharynx, no thrush


Ears: bilateral: normal





- Neck


Neck: no lymphadenopathy, normal ROM, no rigidity, no stridor, no thyromegaly


Carotids: bilateral: upstroke delayed, bruit present


Thyroid: bilateral: normal size





- Respiratory


Respiratory: bilateral: diminished, rhonchi, wheezing, prolonged expiration, 

negative: dullness, rales





- Cardiovascular


Rhythm: regular


Heart sounds: normal: S1, S2


Abnormal Heart Sounds: systolic murmur, trace bilateral pedal edema





- Gastrointestinal


General gastrointestinal: normal bowel sounds, soft, no tenderness, no 

umbilical hernia, no ventral hernia





- Integumentary


Integumentary: normal, normal turgor





- Neurologic


Neurologic: CNII-XII intact





- Musculoskeletal


Musculoskeletal: generalized weakness, strength equal bilaterally





- Psychiatric


Psychiatric: A&O x's 2, appropriate affect, intact judgment & insight





- Labs


CBC & Chem 7: 


 05/09/17 08:33





 05/09/17 08:30


Labs: 


 Abnormal Lab Results - Last 24 Hours (Table)











  05/08/17 05/09/17 05/09/17 Range/Units





  11:37 08:30 08:33 


 


WBC    13.1 H  (3.8-10.6)  k/uL


 


RBC    3.71 L  (3.80-5.40)  m/uL


 


Hgb    9.6 L D  (11.4-16.0)  gm/dL


 


Hct    30.6 L  (34.0-46.0)  %


 


RDW    20.7 H  (11.5-15.5)  %


 


Carbon Dioxide   31 H   (22-30)  mmol/L


 


BUN   48 H   (7-17)  mg/dL


 


Creatinine   1.20 H   (0.52-1.04)  mg/dL


 


Glucose   137 H   (74-99)  mg/dL


 


Crossmatch  See Detail    














Assessment and Plan


Plan: 





1.  Metabolic encephalopathy due to hypoxia with shortness of breath and acute 

on chronic diastolic heart failure.  Continue Lasix 40 mg IV every 8 hours, I&O 

and daily weights, cardiology consult, metoprolol 25 mg every 8 hours.





2.  Chronic blood loss anemia secondary to possible GI bleed as well as 

hemoptysis.  Monitor hemoglobin.  Transfuse 1 unit of packed RBCs.





3. CAD post-PCI of the LAD.  Continue metoprolol 25 mg orally twice every day, 

Lipitor 40 mg orally once every day, losartan 50 mg orally once every day, and 

metoprolol 25 mg orally twice every day.





4.  History of CVA of the left frontal lobe.resume Eliquis 2.5 mg po bid has 

been on hold due to acute GI bleed and acute blood loss anemia on last 

admission.





5.  Carotid artery disease as well as PAD.  Currently under the care of 

vascular surgery continue Lipitor 40 mg orally once every day for secondary 

prevention.





6.  Hypertension and hypertensive cardiovascular disease.  Continue losartan 50 

mg orally once every day, metoprolol 25 mg orally twice every day, hydralazine 

75 mg orally 2 times every day.





7.  Hyperlipidemia.  Continue Lipitor 40 mg orally once every day.





8.  ALLERGIC rhinitis.  Continue singular 10 mg at bedtime.





9.  Lumbar degenerative disc disease with spondylolisthesis and facet 

arthropathy.  Post epidural injection.





9. Chronic atrial fibrillation. Continue metoprolol 25 mg orally twice every 

day and eliquis has been on hold .





10.  DVT prophylaxis.  Knee-high KEVIN hose as well as SCD's.





11.  GI prophylaxis.  Continue Protonix 40 mg orally once every day along with 

Carafate 1 g orally twice every day.





12.  Patient is full code.





13.  Nonsustained ventricular tachycardia.  Patient is followed by cardiology.





14.  Discharge plan: Return to [facility] on Wednesday.





Impression and plan of care have been directed as dictated by the signing 

physician.  Danielle Herbert nurse practitioner acting as scribe for signing 

physician.


Time with Patient: Greater than 30

## 2017-05-10 VITALS — SYSTOLIC BLOOD PRESSURE: 127 MMHG | HEART RATE: 108 BPM | DIASTOLIC BLOOD PRESSURE: 55 MMHG | TEMPERATURE: 97.8 F

## 2017-05-10 LAB
ALP SERPL-CCNC: 103 U/L (ref 38–126)
ALT SERPL-CCNC: 32 U/L (ref 9–52)
ANION GAP SERPL CALC-SCNC: 7 MMOL/L
AST SERPL-CCNC: 23 U/L (ref 14–36)
BUN SERPL-SCNC: 45 MG/DL (ref 7–17)
CALCIUM SPEC-MCNC: 8.4 MG/DL (ref 8.4–10.2)
CH: 25.3
CHCM: 30.2
CHLORIDE SERPL-SCNC: 97 MMOL/L (ref 98–107)
CO2 SERPL-SCNC: 39 MMOL/L (ref 22–30)
ERYTHROCYTE [DISTWIDTH] IN BLOOD BY AUTOMATED COUNT: 3.69 M/UL (ref 3.8–5.4)
ERYTHROCYTE [DISTWIDTH] IN BLOOD: 20.5 % (ref 11.5–15.5)
GLUCOSE SERPL-MCNC: 109 MG/DL (ref 74–99)
HCT VFR BLD AUTO: 30.9 % (ref 34–46)
HDW: 3.68
HGB BLD-MCNC: 9.4 GM/DL (ref 11.4–16)
MCH RBC QN AUTO: 25.6 PG (ref 25–35)
MCHC RBC AUTO-ENTMCNC: 30.5 G/DL (ref 31–37)
MCV RBC AUTO: 83.9 FL (ref 80–100)
NON-AFRICAN AMERICAN GFR(MDRD): 51
POTASSIUM SERPL-SCNC: 3.7 MMOL/L (ref 3.5–5.1)
PROT SERPL-MCNC: 5.9 G/DL (ref 6.3–8.2)
SODIUM SERPL-SCNC: 143 MMOL/L (ref 137–145)
WBC # BLD AUTO: 9 K/UL (ref 3.8–10.6)

## 2017-05-10 RX ADMIN — FUROSEMIDE SCH MG: 20 TABLET ORAL at 08:28

## 2017-05-10 RX ADMIN — METOPROLOL TARTRATE SCH MG: 25 TABLET, FILM COATED ORAL at 08:29

## 2017-05-10 RX ADMIN — PANTOPRAZOLE SODIUM SCH MG: 40 TABLET, DELAYED RELEASE ORAL at 06:33

## 2017-05-10 RX ADMIN — ASPIRIN 81 MG CHEWABLE TABLET SCH MG: 81 TABLET CHEWABLE at 08:28

## 2017-05-10 RX ADMIN — NITROGLYCERIN SCH INCH: 20 OINTMENT TOPICAL at 08:29

## 2017-05-10 RX ADMIN — LOSARTAN POTASSIUM SCH MG: 50 TABLET, FILM COATED ORAL at 08:28

## 2017-05-10 RX ADMIN — SUCRALFATE SCH GM: 1 TABLET ORAL at 08:28

## 2017-05-10 NOTE — P.DS
Providers


Date of admission: 


05/07/17 10:24





Expected date of discharge: 05/10/17


Attending physician: 


Giancarlo Chambers





Primary care physician: 


Kinjal Hood





[facility] Course: 





This is a 77-year-old  female patient of Dr. Hood with a previous 

medical history significant for CAD post-PCI of the LAD, hypertension and 

hypertensive cardio vascular disease with left ventricular hypertrophy, 

hyperlipidemia, history of the for arterial occlusive disease, carotid artery 

disease status post right carotid endarterectomy with about 70% stenosis of 

bilateral carotid arteries, has been under the care of cardiology, pulmonary 

medicine, vascular surgery, gastroenterology, she was recently hospitalized at 

[facility] in 03/16/2017 after she was admitted for acute respiratory 

failure secondary to acute diastolic heart failure and was seen and evaluated 

by pulmonary and cardiology along with gastroenterology at that time she was 

given 2 units of packed red blood cells and the patient was sent home , 

subsequently she followed with me as an outpatient, patient developed to have a 

significant anemia with significant GI bleed at that time she was sent to the 

ER in 04/08/2017 she was treated there and the patient was sent home however he 

was recommended for the patient to go back on her Eliquis despite the fact that 

she has been anemic and has been having some blood loss with recurrent 

transfusion, patient was very weak to day after she had a bowel movement and 

stated that there is blood in stool ,so her son Ivan brought her to the 

emergency department at [facility] 4/29/17 with mental status changes 

she was found to have a urinary tract infection as well as encephalopathy at 

the same time stated that she has been taking her Eliquis at this time on the 

recommendations of her cardiologist, patient did have a hemoglobin of 9.9, she 

did have a urinary tract infection and she shows some signs of encephalopathy 

thought to be due to pain medication. She was seen by cardiology with 

recommendations to resume eliquis and aspirin. Hemoglobin dropped to 7.7. 

Aspirin and eliquis were held.  As seen by Dr. Mejia recommended a double 

balloon enteroscopy in the future should anticoagulation be necessary.





She now presents from the nursing home with shortness of breath and decreased 

pulse ox as well as tachypnea and tachycardia with trace pedal edema.  BNP was 

5400.  Chest x-ray showed new patchy opacities in the right midlung and right 

lower lung suspicious for multifocal pneumonia superimposed upon chronic 

interstitial lung disease.  Chronic interstitial prominence may be related to 

interstitial pulmonary edema and background of chronic interstitial lung 

disease.  Her hemoglobin was 8.  She was in atrial fibrillation with rapid 

ventricular response below 110s.  BUN 39 and creatinine 1.44.  She was started 

on IV Lasix, nebulizer treatments and consult requested with cardiology. 





5/8: Patient has been seen by cardiology with recommendations to continue 

current dose of IV Lasix of 40 mg every 8 hours.  BUN is 43 and creatinine 

1.24.  Hemoglobin is 7.2.  One unit of packed RBCs will be ordered.  Patient is 

anxious to be discharged home.  She is agreeable to stay until tomorrow.





5/9: Patient did have a run of nonsustained ventricular tachycardia.  She is 

followed by cardiology.  Lasix IV changed to oral 60 mg twice daily.  Repeat 

hemoglobin is 9.6 after 2 units of packed RBCs.  BUN 48 creatinine 1.20.  She 

has been afebrile.  Blood culture showing no growth.  Patient will be monitored 

overnight and plan for discharge to nursing home tomorrow.





5/10: Correction: Patient had 1 unit packed RBCs transfused.  Her hemoglobin 

today is 9.4.  Breathing status is stable.  She has had no source of bleeding.  

Patient will be discharged back to [facility] today in stable condition.





Discharge diagnoses:


1.  Metabolic encephalopathy due to hypoxia with shortness of breath and acute 

on chronic diastolic heart failure.  


2.  Chronic blood loss anemia secondary to possible GI bleed as well as 

hemoptysis status post transfusion of 1 unit of packed RBCs.


3. CAD post-PCI of the LAD. 


4.  History of CVA of the left frontal lobe.


5.  Carotid artery disease as well as PAD.  


6.  Hypertension and hypertensive cardiovascular disease.  


7.  Hyperlipidemia. 


8.  ALLERGIC rhinitis.  


9.  Lumbar degenerative disc disease with spondylolisthesis and facet 

arthropathy.  Post epidural injection.


10. Chronic atrial fibrillation. 





Discharge plan: Return to [facility] on Tuesday.





Impression and plan of care have been directed as dictated by the signing 

physician.  Danielle Herbert nurse practitioner acting as scribe for signing 

physician.





Patient Condition at Discharge: Good





Plan - Discharge Summary


New Discharge Prescriptions: 


oxyCODONE HCL [OxyIR] 10 mg PO Q8H PRN #90 tab


 PRN Reason: Pain


Discharge Medication List





Montelukast Sodium [Singulair] 10 mg PO HS 09/20/14 [History]


Metoprolol Tartrate [Lopressor] 25 mg PO Q8H 12/05/15 [History]


Nitroglycerin Sl Tabs [Nitrostat] 0.4 mg SUBLINGUAL Q5M PRN 12/05/15 [History]


Atorvastatin [Lipitor] 40 mg PO HS 12/06/15 [History]


Ergocalciferol [Vitamin D2 (DRISDOL)] 50,000 unit PO ARGUETA 03/15/17 [History]


Ipratropium-Albuterol Nebulize [Duoneb 0.5 mg-3 mg/3 ml Soln] 3 ml INHALATION RT

-Q6H PRN 03/15/17 [History]


Melatonin 3 mg PO HS 03/15/17 [History]


Multivitamins, Thera [Multivitamin (formulary)] 1 tab PO DAILY 03/15/17 [History

]


Omeprazole 40 mg PO DAILY 03/15/17 [History]


amLODIPine [Norvasc] 10 mg PO DAILY 03/15/17 [History]


hydrALAZINE HCL [Apresoline] 75 mg PO BID 03/15/17 [History]


Sucralfate [Carafate] 1 gm PO BID #600 ml 03/20/17 [Rx]


Aspirin 81 mg PO DAILY 04/04/17 [History]


Losartan Potassium [Cozaar] 100 mg PO DAILY 04/04/17 [History]


Potassium Chloride ER [K-Dur 20] 20 meq PO DAILY 04/04/17 [History]


Ferrous Sulfate [Iron (65 MG Elemental)] 325 mg PO W/LUNCH  tab 04/08/17 [Rx]


Cephalexin [Keflex] 500 mg PO Q8HR #21 cap 04/29/17 [Rx]


Furosemide [Lasix] 60 mg PO BID@0900,1600  tab 05/10/17 [Rx]


oxyCODONE HCL [OxyIR] 10 mg PO Q8H PRN #90 tab 05/10/17 [Rx]








Follow up Appointment(s)/Referral(s): 


Kinjal Hood MD [Primary Care Provider] - 1 Week

## 2017-05-11 ENCOUNTER — HOSPITAL ENCOUNTER (INPATIENT)
Dept: HOSPITAL 47 - EC | Age: 72
LOS: 6 days | Discharge: NURSING, REHAB OR LONG-TERM CARE | DRG: 481 | End: 2017-05-17
Payer: MEDICARE

## 2017-05-11 VITALS — BODY MASS INDEX: 24.7 KG/M2

## 2017-05-11 DIAGNOSIS — I70.0: ICD-10-CM

## 2017-05-11 DIAGNOSIS — J45.909: ICD-10-CM

## 2017-05-11 DIAGNOSIS — I25.10: ICD-10-CM

## 2017-05-11 DIAGNOSIS — I25.2: ICD-10-CM

## 2017-05-11 DIAGNOSIS — L89.620: ICD-10-CM

## 2017-05-11 DIAGNOSIS — K21.9: ICD-10-CM

## 2017-05-11 DIAGNOSIS — I50.32: ICD-10-CM

## 2017-05-11 DIAGNOSIS — I07.1: ICD-10-CM

## 2017-05-11 DIAGNOSIS — F17.200: ICD-10-CM

## 2017-05-11 DIAGNOSIS — I35.1: ICD-10-CM

## 2017-05-11 DIAGNOSIS — E78.5: ICD-10-CM

## 2017-05-11 DIAGNOSIS — Z79.899: ICD-10-CM

## 2017-05-11 DIAGNOSIS — L89.610: ICD-10-CM
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
## 2017-05-11 DIAGNOSIS — J44.9: ICD-10-CM

## 2017-05-11 DIAGNOSIS — I48.2: ICD-10-CM

## 2017-05-11 DIAGNOSIS — Z86.73: ICD-10-CM

## 2017-05-11 DIAGNOSIS — S72.142A: Primary | ICD-10-CM

## 2017-05-11 DIAGNOSIS — L89.152: ICD-10-CM

## 2017-05-11 DIAGNOSIS — Z95.5: ICD-10-CM

## 2017-05-11 DIAGNOSIS — I34.1: ICD-10-CM

## 2017-05-11 DIAGNOSIS — Z79.82: ICD-10-CM

## 2017-05-11 DIAGNOSIS — Z82.49: ICD-10-CM

## 2017-05-11 DIAGNOSIS — Z74.01: ICD-10-CM
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
## 2017-05-11 DIAGNOSIS — N18.9: ICD-10-CM

## 2017-05-11 DIAGNOSIS — I48.1: ICD-10-CM

## 2017-05-11 DIAGNOSIS — I13.0: ICD-10-CM

## 2017-05-11 DIAGNOSIS — D62: ICD-10-CM

## 2017-05-11 DIAGNOSIS — J84.112: ICD-10-CM

## 2017-05-11 LAB
ALP SERPL-CCNC: 134 U/L (ref 38–126)
ALT SERPL-CCNC: 37 U/L (ref 9–52)
ANION GAP SERPL CALC-SCNC: 9 MMOL/L
APTT BLD: 22.6 SEC (ref 22–30)
AST SERPL-CCNC: 31 U/L (ref 14–36)
BUN SERPL-SCNC: 45 MG/DL (ref 7–17)
CALCIUM SPEC-MCNC: 8.4 MG/DL (ref 8.4–10.2)
CELLS COUNTED: 100
CH: 25.4
CHCM: 30.6
CHLORIDE SERPL-SCNC: 98 MMOL/L (ref 98–107)
CK SERPL-CCNC: 21 U/L (ref 30–135)
CO2 SERPL-SCNC: 33 MMOL/L (ref 22–30)
ERYTHROCYTE [DISTWIDTH] IN BLOOD BY AUTOMATED COUNT: 3.86 M/UL (ref 3.8–5.4)
ERYTHROCYTE [DISTWIDTH] IN BLOOD: 20.6 % (ref 11.5–15.5)
GLUCOSE SERPL-MCNC: 120 MG/DL (ref 74–99)
HCT VFR BLD AUTO: 32.1 % (ref 34–46)
HDW: 3.69
HGB BLD-MCNC: 9.5 GM/DL (ref 11.4–16)
INR PPP: 1.2 (ref ?–1.1)
MAGNESIUM SPEC-SCNC: 2.2 MG/DL (ref 1.6–2.3)
MCH RBC QN AUTO: 24.6 PG (ref 25–35)
MCHC RBC AUTO-ENTMCNC: 29.5 G/DL (ref 31–37)
MCV RBC AUTO: 83.1 FL (ref 80–100)
NON-AFRICAN AMERICAN GFR(MDRD): 55
POTASSIUM SERPL-SCNC: 3.4 MMOL/L (ref 3.5–5.1)
PROT SERPL-MCNC: 5.9 G/DL (ref 6.3–8.2)
PT BLD: 12.2 SEC (ref 9–12)
SODIUM SERPL-SCNC: 140 MMOL/L (ref 137–145)
WBC # BLD AUTO: 14.5 K/UL (ref 3.8–10.6)
WBC (PEROX): 14.01

## 2017-05-11 PROCEDURE — 86901 BLOOD TYPING SEROLOGIC RH(D): CPT

## 2017-05-11 PROCEDURE — 85027 COMPLETE CBC AUTOMATED: CPT

## 2017-05-11 PROCEDURE — 85025 COMPLETE CBC W/AUTO DIFF WBC: CPT

## 2017-05-11 PROCEDURE — 94640 AIRWAY INHALATION TREATMENT: CPT

## 2017-05-11 PROCEDURE — 36415 COLL VENOUS BLD VENIPUNCTURE: CPT

## 2017-05-11 PROCEDURE — 80053 COMPREHEN METABOLIC PANEL: CPT

## 2017-05-11 PROCEDURE — 86850 RBC ANTIBODY SCREEN: CPT

## 2017-05-11 PROCEDURE — 85610 PROTHROMBIN TIME: CPT

## 2017-05-11 PROCEDURE — 83735 ASSAY OF MAGNESIUM: CPT

## 2017-05-11 PROCEDURE — 82550 ASSAY OF CK (CPK): CPT

## 2017-05-11 PROCEDURE — 86900 BLOOD TYPING SEROLOGIC ABO: CPT

## 2017-05-11 PROCEDURE — 87040 BLOOD CULTURE FOR BACTERIA: CPT

## 2017-05-11 PROCEDURE — 94760 N-INVAS EAR/PLS OXIMETRY 1: CPT

## 2017-05-11 PROCEDURE — 93306 TTE W/DOPPLER COMPLETE: CPT

## 2017-05-11 PROCEDURE — 81003 URINALYSIS AUTO W/O SCOPE: CPT

## 2017-05-11 PROCEDURE — 93005 ELECTROCARDIOGRAM TRACING: CPT

## 2017-05-11 PROCEDURE — 82553 CREATINE MB FRACTION: CPT

## 2017-05-11 PROCEDURE — 73502 X-RAY EXAM HIP UNI 2-3 VIEWS: CPT

## 2017-05-11 PROCEDURE — 85730 THROMBOPLASTIN TIME PARTIAL: CPT

## 2017-05-11 PROCEDURE — 86920 COMPATIBILITY TEST SPIN: CPT

## 2017-05-11 PROCEDURE — 80048 BASIC METABOLIC PNL TOTAL CA: CPT

## 2017-05-11 PROCEDURE — 71010: CPT

## 2017-05-11 PROCEDURE — 87086 URINE CULTURE/COLONY COUNT: CPT

## 2017-05-11 RX ADMIN — FUROSEMIDE SCH MG: 20 TABLET ORAL at 21:14

## 2017-05-11 RX ADMIN — IPRATROPIUM BROMIDE AND ALBUTEROL SULFATE SCH: .5; 3 SOLUTION RESPIRATORY (INHALATION) at 21:41

## 2017-05-11 RX ADMIN — METOPROLOL TARTRATE SCH MG: 25 TABLET, FILM COATED ORAL at 21:14

## 2017-05-11 RX ADMIN — CEFAZOLIN SCH MLS/HR: 330 INJECTION, POWDER, FOR SOLUTION INTRAMUSCULAR; INTRAVENOUS at 10:28

## 2017-05-11 RX ADMIN — HYDROMORPHONE HYDROCHLORIDE PRN MG: 1 INJECTION, SOLUTION INTRAMUSCULAR; INTRAVENOUS; SUBCUTANEOUS at 13:16

## 2017-05-11 RX ADMIN — HYDROCODONE BITARTRATE AND ACETAMINOPHEN PRN EACH: 5; 325 TABLET ORAL at 12:10

## 2017-05-11 RX ADMIN — CEFAZOLIN SCH MLS/HR: 330 INJECTION, POWDER, FOR SOLUTION INTRAMUSCULAR; INTRAVENOUS at 16:14

## 2017-05-11 RX ADMIN — IPRATROPIUM BROMIDE AND ALBUTEROL SULFATE SCH ML: .5; 3 SOLUTION RESPIRATORY (INHALATION) at 15:55

## 2017-05-11 RX ADMIN — HYDROMORPHONE HYDROCHLORIDE PRN MG: 1 INJECTION, SOLUTION INTRAMUSCULAR; INTRAVENOUS; SUBCUTANEOUS at 17:16

## 2017-05-11 RX ADMIN — DIAZEPAM PRN MG: 5 INJECTION, SOLUTION INTRAMUSCULAR; INTRAVENOUS at 09:25

## 2017-05-11 RX ADMIN — MONTELUKAST SODIUM SCH MG: 10 TABLET, FILM COATED ORAL at 22:10

## 2017-05-11 RX ADMIN — IPRATROPIUM BROMIDE AND ALBUTEROL SULFATE SCH: .5; 3 SOLUTION RESPIRATORY (INHALATION) at 12:05

## 2017-05-11 RX ADMIN — IPRATROPIUM BROMIDE AND ALBUTEROL SULFATE SCH ML: .5; 3 SOLUTION RESPIRATORY (INHALATION) at 12:05

## 2017-05-11 RX ADMIN — ATORVASTATIN CALCIUM SCH MG: 40 TABLET, FILM COATED ORAL at 21:14

## 2017-05-11 RX ADMIN — HYDROMORPHONE HYDROCHLORIDE PRN MG: 1 INJECTION, SOLUTION INTRAMUSCULAR; INTRAVENOUS; SUBCUTANEOUS at 05:55

## 2017-05-11 RX ADMIN — Medication SCH MG: at 21:14

## 2017-05-11 RX ADMIN — DILTIAZEM HYDROCHLORIDE ONE MLS/HR: 5 INJECTION INTRAVENOUS at 22:59

## 2017-05-11 RX ADMIN — HEPARIN SODIUM SCH UNIT: 5000 INJECTION, SOLUTION INTRAVENOUS; SUBCUTANEOUS at 16:16

## 2017-05-11 RX ADMIN — HEPARIN SODIUM SCH UNIT: 5000 INJECTION, SOLUTION INTRAVENOUS; SUBCUTANEOUS at 10:29

## 2017-05-11 RX ADMIN — HYDROMORPHONE HYDROCHLORIDE PRN MG: 1 INJECTION, SOLUTION INTRAMUSCULAR; INTRAVENOUS; SUBCUTANEOUS at 09:11

## 2017-05-11 RX ADMIN — HYDROMORPHONE HYDROCHLORIDE PRN MG: 1 INJECTION, SOLUTION INTRAMUSCULAR; INTRAVENOUS; SUBCUTANEOUS at 21:08

## 2017-05-11 RX ADMIN — CEFAZOLIN SCH MLS/HR: 330 INJECTION, POWDER, FOR SOLUTION INTRAMUSCULAR; INTRAVENOUS at 09:10

## 2017-05-11 RX ADMIN — HYDROCODONE BITARTRATE AND ACETAMINOPHEN PRN EACH: 5; 325 TABLET ORAL at 22:10

## 2017-05-11 RX ADMIN — DILTIAZEM HYDROCHLORIDE ONE MLS/HR: 5 INJECTION INTRAVENOUS at 01:45

## 2017-05-11 RX ADMIN — SUCRALFATE SCH GM: 1 TABLET ORAL at 21:13

## 2017-05-11 NOTE — P.HPOR
History of Present Illness


H&P Date: 05/11/17


Chief Complaint: Left hip pain 


This is a 71-year-old female who fell in her bathroom at [facility] last evening.  

The patient had just returned to [facility] from the hospital.  She has history of 

A. fib with chronic anemia.  The patient's daughter states that she's been in 

the hospital quite a bit recently.  She states that she lost her balance and 

fell sustaining injury to her left hip.








Past Medical History


Past Medical History: Atrial Fibrillation, Asthma, Coronary Artery Disease (CAD)

, COPD, CVA/TIA, GERD/Reflux, GI Bleed, Hyperlipidemia, Hypertension, Mitral 

Valve Prolapse (MVP), Musculoskeletal Disorder, Osteoarthritis (OA), 

Respiratory Disorder, Vascular Disorder


Additional Past Medical History / Comment(s): diverticulitits


Last Myocardial Infarction Date:: unknown


History of Any Multi-Drug Resistant Organisms: None Reported


Past Surgical History: Cholecystectomy, Heart Catheterization With Stent, 

Hysterectomy, Orthopedic Surgery


Additional Past Surgical History / Comment(s): carotid, back sx


Past Anesthesia/Blood Transfusion Reactions: No Reported Reaction


Date of Last Stent Placement:: 2011


Past Psychological History: No Psychological Hx Reported


Smoking Status: Current some day smoker


Past Alcohol Use History: None Reported


Past Drug Use History: None Reported





- Past Family History


  ** Son(s)


Family Medical History: No Reported History





  ** Daughter(s)


Family Medical History: No Reported History





  ** Mother


Family Medical History: Congestive Heart Failure (CHF)





  ** Father


Family Medical History: Cancer


Additional Family Medical History / Comment(s): Bone ca





  ** Sister(s)


Family Medical History: Coronary Artery Disease (CAD)





  ** Brother(s)


Family Medical History: Coronary Artery Disease (CAD), Myocardial Infarction (MI

)





Medications and Allergies


 Home Medications











 Medication  Instructions  Recorded  Confirmed  Type


 


Montelukast Sodium [Singulair] 10 mg PO HS@2100 09/20/14 05/11/17 History


 


Metoprolol Tartrate [Lopressor] 25 mg PO TID@0600,1400,2200 12/05/15 05/11/17 

History


 


Nitroglycerin Sl Tabs [Nitrostat] 0.4 mg SUBLINGUAL Q5M PRN 12/05/15 05/11/17 

History


 


Atorvastatin [Lipitor] 40 mg PO HS 12/06/15 05/11/17 History


 


Ergocalciferol [Vitamin D2 50,000 unit PO ARGUETA 03/15/17 05/11/17 History





(DRISDOL)]    


 


Ipratropium-Albuterol Nebulize 3 ml INHALATION RT-Q6H PRN 03/15/17 05/11/17 

History





[Duoneb 0.5 mg-3 mg/3 ml Soln]    


 


Melatonin 3 mg PO HS 03/15/17 05/11/17 History


 


Multivitamins, Thera [Multivitamin 1 tab PO DAILY 03/15/17 05/11/17 History





(formulary)]    


 


Omeprazole 40 mg PO DAILY@0600 03/15/17 05/11/17 History


 


amLODIPine [Norvasc] 10 mg PO DAILY 03/15/17 05/11/17 History


 


hydrALAZINE HCL [Apresoline] 75 mg PO BID@0900,2100 03/15/17 05/11/17 History


 


Aspirin 81 mg PO DAILY 04/04/17 05/11/17 History


 


Losartan Potassium [Cozaar] 100 mg PO DAILY@0900 04/04/17 05/11/17 History


 


Potassium Chloride ER [K-Dur 20] 20 meq PO DAILY 04/04/17 05/11/17 History


 


Cephalexin [Keflex] 500 mg PO TID@0600,1400,2200 05/11/17 05/11/17 History


 


Ferrous Sulfate [Iron (65  mg PO DAILY@1200 05/11/17 05/11/17 History





Elemental)]    


 


Furosemide [Lasix] 60 mg PO BID 05/11/17 05/11/17 History


 


Sucralfate [Carafate] 1 gm PO BID@0900,2100 05/11/17 05/11/17 History











 Allergies











Allergy/AdvReac Type Severity Reaction Status Date / Time


 


diphenhydramine Allergy  Unknown Verified 05/11/17 07:44





[From Benadryl]     


 


morphine Allergy  Rash/Hives Verified 05/11/17 07:44


 


pregabalin [From Lyrica] Allergy  Unknown Verified 05/11/17 07:44


 


Sulfa (Sulfonamide Allergy  Unknown Verified 05/11/17 07:44





Antibiotics)     














Physical Examination





This is a pleasant 71-year-old female in no acute distress.  She is alert and 

oriented 3.  Her daughter is present at bedside.  She is evaluated in the 

emergency department.





Exam of the head neck reveal no obvious deformity.  She has full cervical spine 

motion without difficulty or pain.  There is no pain with palpation about 

cervical spine or paraspinal musculature.





Exam the upper extremities unremarkable.  She has no obvious deformity.  She 

has full shoulder, elbow, wrist and finger motion without difficulty or pain.  

Neurovascular status the upper extremities is intact.





Exam the lower extremities reveals no obvious deformity with minimal shortening 

of the left lower extremity.  She has full foot and ankle motion without 

difficulty or pain.  There is pain with motion of the left hip.  Neurovascular 

status to the lower extremities is intact.





Results





X-ray of the pelvis and left hip reveal a minimally displaced intertrochanteric 

fracture with displacement of the lesser trochanter.  No other bony modalities 

are noted.





- Labs


Result Diagrams: 


 05/11/17 00:24





 05/11/17 00:24





Assessment and Plan


(1) Intertrochanteric fracture of left hip


Status: Acute   





(2) Rapid atrial fibrillation


Status: Acute   


Plan: 





The clinical and x-ray findings are discussed with the patient and her 

daughter.  It is recommended that she undergo closed reduction with insertion 

of intertrochanteric nail of the left hip.  The procedures been discussed in 

detail including the possible risks and outcomes of surgery.  We are waiting 

medical evaluation and presurgical clearance.  She is tentatively scheduled for 

10:30 AM on 05/12/2017.

## 2017-05-11 NOTE — XR
EXAM:

  XR Left Hip With Pelvis When Performed, 1 View

 

CLINICAL HISTORY:

  Reason: Pain

 

TECHNIQUE:

  Frontal view of the left hip, with pelvis when performed.

 

COMPARISON:

  Pelvic x-ray 12/29/2014.

 

FINDINGS:

  Bones/joints:  Acute mildly displaced intertrochanteric/proximal 

diaphyseal fracture of the left femur.  No dislocation.  Severe 

degenerative changes of the lower lumbar spine.  Mild degenerative 

changes of bilateral hips.  Redemonstrated 2.2 cm round density 

projecting over the superior aspect of the left acetabulum.

  Soft tissues:  Unremarkable.

  Vasculature:  Vascular graft stent in the region of the left common 

iliac artery.  Vascular calcifications.

 

IMPRESSION:     

  Acute mildly displaced intertrochanteric/proximal diaphyseal fracture 

of the left femur.  

 

 

 

[employer]

 

05/11/17 01:18 Verify Receipt Verified receipt with RENAY Cox who 

will give to Dr. Morocoh on 05/11 01:18 (-04:00)

## 2017-05-11 NOTE — P.CONS
History of Present Illness





- Reason for Consult


Consult date: 05/11/17


Medical management


Requesting physician: Arturo Reis





- Chief Complaint


Left hip fracture post fall, A. fib with RVR, CHF exacerbation, asthma, CA





- History of Present Illness





71-year-old  female one of Dr. Hood's patient who was rehospitalized 

this past week with significant hypoxia and shortness of breath consistent with 

chronic diastolic heart failure with her diuretics was hold from the week 

before because of GI bleed and hypotension patient had significant fluid 

retention and significant shortness of breath was hospitalized until the 10th 

when patient was more stable ended up being sent back to the nursing home 

rehab.  Patient earlier the week before was in the hospital with 

gastrointestinal bleed been on anticoagulation for her A. fib her 

anticoagulation was stopped completely at the time.


[place] 

[place] she [place] the hospital she doesn't remember having any syncopal episode or 

any sign and symptom of TIA or CVA she tripped and fell on the right side ended 

up having severe pain and discomfort in the left hip area was not been able to 

ambulate and walk ended up coming to the emergency department her x-ray of the 

hip showed intertrochanteric fracture patient will be admitted to Dr. Chato hernandez for surgery from medical standpoint.  Hemoglobin has been much 

better since 2 days ago when she had the transfusion and hemoglobin corrected 

up to 9.5.  Patient also found to be in A. fib with pulse rate running around 

100 beats per minutes.  Line again from early in the week she had significant 

encephalopathy due to hypoxia has improved correcting her anemia and shortness 

of breath.


Patient might be evaluated by cardiology before going for either 

hemiarthroplasty or ORIF.





Review of Systems


Constitutional: Reports anorexia, Reports fatigue, Reports weakness, Reports 

weight loss, Denies as per HPI, Denies chills, Denies chronic headaches, Denies 

chronic pain, Denies daytime sleepiness, Denies fever, Denies lethargy, Denies 

malaise, Denies night sweats, Denies poor appetite, Denies sweats, Denies 

weight gain


Eyes: bilateral as per HPI


Ears: bilateral: decreased hearing


Ears, nose, mouth and throat: Reports ant. neck pain, Reports nasal congestion, 

Reports sinus pain, Reports sinus pressure, Denies as per HPI, Denies bleeding 

gums, Denies dental pain, Denies dysphagia, Denies epistaxis, Denies headache, 

Denies hoarseness, Denies mouth pain, Denies nasal discharge, Denies neck 

fullness/pressure, Denies neck lump, Denies nose pain, Denies odynophagia, 

Denies post-nasal drip, Denies swelling in mouth, Denies swelling in throat, 

Denies sore throat, Denies vertigo, Denies voice changes


Cardiovascular: Reports chest pain, Reports decreased exercise tolerance, 

Reports dyspnea on exertion, Reports edema, Reports irregular heart beat, 

Reports leg edema, Reports orthopnea, Reports palpitations, Reports rapid heart 

beat, Reports shortness of breath, Denies as per HPI, Denies claudication, 

Denies high blood pressure, Denies lightheadedness, Denies paroxysmal nocturnal 

dyspnea, Denies phlebitis, Denies syncope


Respiratory: Reports congestion, Reports cough, Reports respiratory infections, 

Denies as per HPI, Denies cough with sputum, Denies dyspnea, Denies excessive 

sputum, Denies hemoptysis, Denies home oxygen, Denies pain, Denies pain on 

inspiration, Denies pleurisy, Denies sleep apnea, Denies snoring, Denies 

wheezing


Gastrointestinal: Reports abdominal pain, Reports dyspepsia, Reports indigestion

, Reports nausea, Denies as per HPI, Denies belching, Denies bloating, Denies 

BRBPR, Denies change in bowel habits, Denies coffee ground emesis, Denies 

constipation, Denies diarrhea, Denies early satiety, Denies excessive gas, 

Denies heartburn, Denies hematemesis, Denies hematochezia, Denies jaundice, 

Denies lactose intolerance, Denies loss of appetite, Denies melena, Denies 

vomiting


Genitourinary: Reports urge incontinence, Denies as per HPI, Denies abnormal 

vaginal bleeding, Denies decreased libido, Denies difficulty conceiving, Denies 

difficulty voiding, Denies dysmenorrhea, Denies dyspareunia, Denies dysuria, 

Denies flank pain, Denies genital sores, Denies hematuria, Denies hot flashes, 

Denies incomplete emptying, Denies kidney stones, Denies menorrhagia, Denies 

mixed incontinence, Denies nocturia, Denies pelvic pain, Denies post void 

dribbling, Denies pregnant, Denies prolapse symptoms, Denies stress incontinence

, Denies urgency, Denies urinary frequency, Denies vaginal discharge, Denies 

vaginal dryness, Denies vaginal itching, Denies vaginal odor


Musculoskeletal: Reports low back pain, Reports myalgias, Reports neck pain, 

Denies as per HPI, Denies arm numbness/tingling, Denies atrophy, Denies 

fractures, Denies frequent falls, Denies gait dysfunction, Denies hot joints, 

Denies leg numbness/tingling, Denies limitation of motion, Denies loss of height

, Denies morning stiffness, Denies muscle cramps, Denies muscle weakness, 

Denies neck stiffness, Denies prior amputations, Denies redness of joints, 

Denies shooting arm pain, Denies shooting leg pain


Musculoskeletal: left: hip pain, bilateral: ankle pain


Integumentary: Reports pruritus, Reports rash, Denies as per HPI, Denies acne, 

Denies boils, Denies brittle nails, Denies change in hair/nails, Denies color 

changes, Denies darkening of skin, Denies depigmentation, Denies dryness, 

Denies foot/leg ulcers, Denies growths, Denies hirsutism, Denies lesions, 

Denies onychomycosis, Denies sores, Denies striae, Denies unusual bruising, 

Denies wounds


Neurological: Reports aphasia, Reports ataxia, Reports gait dysfunction, 

Reports numbness, Reports paresthesias, Reports weakness, Denies as per HPI, 

Denies balance difficulties, Denies burning pain, Denies change in mentation, 

Denies change in smell/taste, Denies change in speech, Denies confusion, Denies 

convulsions, Denies double vision, Denies head injury, Denies headaches, Denies 

hearing difficulties, Denies lack of coordination, Denies loss of vision, 

Denies memory loss, Denies migraines, Denies motor disturbance, Denies paralysis

, Denies seizures, Denies sensory deficit, Denies spasticity, Denies syncope, 

Denies tic, Denies tingling, Denies transient paralysis, Denies tremors, Denies 

vertigo, Denies visual changes


Psychiatric: Reports anhedonia, Reports depression, Reports mood swings, Denies 

as per HPI, Denies anxiety, Denies anxiety attacks, Denies change in appetite, 

Denies change in libido, Denies change in sleep habits, Denies confusion, 

Denies difficulty concentrating, Denies disorientation, Denies hallucinations, 

Denies hopelessness, Denies hypersomnia, Denies insomnia, Denies irritability, 

Denies memory loss, Denies paranoia, Denies sadness/tearfulness, Denies sleep 

disturbances, Denies suicidal ideation


Endocrine: Reports fatigue, Reports flushing, Denies as per HPI, Denies cold 

intolerance, Denies deepening of the voice, Denies excessive sweating, Denies 

excessive thirst, Denies heat intolerance, Denies high blood sugars, Denies 

increase in ring/shoe/hat size, Denies low blood sugars, Denies nocturia, 

Denies palpitations, Denies polydipsia, Denies polyphagia, Denies polyuria, 

Denies proptosis, Denies recent glucocorticoid use, Denies thyroid mass, Denies 

weight change


Hematologic/Lymphatic: Reports easy bruising, Denies as per HPI, Denies easy 

bleeding, Denies lymphadenopathy, Denies lymphedema, Denies thrombophilia


Allergic/Immunologic: Reports allergic rhinitis, Denies as per HPI, Denies 

anaphylaxis, Denies angioedema, Denies gluten intolerance, Denies persistent 

infections, Denies seasonal allergies, Denies urticaria, Denies wheezing





Past Medical History


Past Medical History: Atrial Fibrillation, Asthma, Coronary Artery Disease (CAD)

, Heart Failure, COPD, CVA/TIA, GERD/Reflux, GI Bleed, Hyperlipidemia, 

Hypertension, Myocardial Infarction (MI), Mitral Valve Prolapse (MVP), 

Musculoskeletal Disorder, Osteoarthritis (OA), Pneumonia, Respiratory Disorder, 

Vascular Disorder


Additional Past Medical History / Comment(s): Pt recently admitted 5/7/17 with 

metabolic encephalopathy due to hypoxia/SOB, chronic blood loss anemia/GI bleed/

hemotysis.     Other hx:  Bronchitis, diverticulitits, lower GI bleed, anemia, 

MVP, PAD, L caratid disease, silent MI, CVA L frontal.


Last Myocardial Infarction Date:: unknown


History of Any Multi-Drug Resistant Organisms: None Reported


Past Surgical History: Adenoidectomy, Appendectomy, Back Surgery, Bladder 

Surgery, Cholecystectomy, Heart Catheterization With Stent, Hysterectomy, 

Orthopedic Surgery, Tonsillectomy, Tubal Ligation


Additional Past Surgical History / Comment(s): R carotid, cervical epidural 

injections, angiogram, sinus sx, EGD/colonoscopy, bladder suspension, stent L 

leg.


Past Anesthesia/Blood Transfusion Reactions: No Reported Reaction


Additional Past Anesthesia/Blood Transfusion Reaction / Comm: Pt has received 

blood in past without reaction.


Date of Last Stent Placement:: 2011


Past Psychological History: No Psychological Hx Reported


Additional Psychological History / Comment(s): [place] [facility] 

[place]  She [place] O2 at HS.  She ambulates with assistance.  She feeds 

herself. She needs assistance with other ADLs.


Smoking Status: Former smoker


Past Alcohol Use History: None Reported


Additional Past Alcohol Use History / Comment(s): Pt started smoking in 1958 

and she states she recently quit.


Past Drug Use History: None Reported





- Past Family History


  ** Son(s)


Family Medical History: No Reported History





  ** Daughter(s)


Family Medical History: No Reported History





  ** Mother


Family Medical History: Congestive Heart Failure (CHF)





  ** Father


Family Medical History: Cancer


Additional Family Medical History / Comment(s): Bone ca





  ** Sister(s)


Family Medical History: Coronary Artery Disease (CAD)





  ** Brother(s)


Family Medical History: Coronary Artery Disease (CAD), Myocardial Infarction (MI

)





Medications and Allergies


 Home Medications











 Medication  Instructions  Recorded  Confirmed  Type


 


Montelukast Sodium [Singulair] 10 mg PO HS@2100 09/20/14 05/11/17 History


 


Metoprolol Tartrate [Lopressor] 25 mg PO TID@0600,1400,2200 12/05/15 05/11/17 

History


 


Nitroglycerin Sl Tabs [Nitrostat] 0.4 mg SUBLINGUAL Q5M PRN 12/05/15 05/11/17 

History


 


Atorvastatin [Lipitor] 40 mg PO HS 12/06/15 05/11/17 History


 


Ergocalciferol [Vitamin D2 50,000 unit PO ARGUETA 03/15/17 05/11/17 History





(DRISDOL)]    


 


Ipratropium-Albuterol Nebulize 3 ml INHALATION RT-Q6H PRN 03/15/17 05/11/17 

History





[Duoneb 0.5 mg-3 mg/3 ml Soln]    


 


Melatonin 3 mg PO HS 03/15/17 05/11/17 History


 


Multivitamins, Thera [Multivitamin 1 tab PO DAILY 03/15/17 05/11/17 History





(formulary)]    


 


Omeprazole 40 mg PO DAILY@0600 03/15/17 05/11/17 History


 


amLODIPine [Norvasc] 10 mg PO DAILY 03/15/17 05/11/17 History


 


hydrALAZINE HCL [Apresoline] 75 mg PO BID@0900,2100 03/15/17 05/11/17 History


 


Aspirin 81 mg PO DAILY 04/04/17 05/11/17 History


 


Losartan Potassium [Cozaar] 100 mg PO DAILY@0900 04/04/17 05/11/17 History


 


Potassium Chloride ER [K-Dur 20] 20 meq PO DAILY 04/04/17 05/11/17 History


 


Cephalexin [Keflex] 500 mg PO TID@0600,1400,2200 05/11/17 05/11/17 History


 


Ferrous Sulfate [Iron (65  mg PO DAILY@1200 05/11/17 05/11/17 History





Elemental)]    


 


Furosemide [Lasix] 60 mg PO BID 05/11/17 05/11/17 History


 


Sucralfate [Carafate] 1 gm PO BID@0900,2100 05/11/17 05/11/17 History











 Allergies











Allergy/AdvReac Type Severity Reaction Status Date / Time


 


diphenhydramine Allergy  Unknown Verified 05/11/17 07:44





[From Benadryl]     


 


morphine Allergy  Rash/Hives Verified 05/11/17 07:44


 


pregabalin [From Lyrica] Allergy  Unknown Verified 05/11/17 07:44


 


Sulfa (Sulfonamide Allergy  Unknown Verified 05/11/17 07:44





Antibiotics)     














Physical Exam


Vitals: 


 Vital Signs











  Temp Pulse Pulse Resp BP BP Pulse Ox


 


 05/11/17 15:56   96      92 L


 


 05/11/17 14:33  98.1 F   107 H  18   151/74  92 L


 


 05/11/17 12:16   100     


 


 05/11/17 12:06   104 H     


 


 05/11/17 11:23  97.7 F   109 H  18   172/64  97


 


 05/11/17 08:30  97.0 F L   114 H  18   148/72  97


 


 05/11/17 07:00   104 H   22  147/72   99


 


 05/11/17 06:00   108 H   20  153/65   99


 


 05/11/17 05:00   110 H   20  163/66   97


 


 05/11/17 04:00   118 H    150/67   97


 


 05/11/17 03:00   116 H   22  163/87   98


 


 05/11/17 02:00   118 H    159/68   97


 


 05/11/17 01:08   120 H   18  155/67   97








 Intake and Output











 05/11/17 05/11/17 05/11/17





 06:59 14:59 22:59


 


Intake Total  120 


 


Balance  120 


 


Intake:   


 


  Oral  120 


 


Other:   


 


  Voiding Method  Bedpan 





  Diaper 


 


  # Voids  0 


 


  Weight  73.482 kg 








 Patient Weight











 05/12/17





 06:59


 


Weight 73.482 kg














- Constitutional


General appearance: no average body habitus, cooperative, no disheveled, no 

mild distress, no morbidly obese, no acute distress, no obese, no severe 

distress, no thin





- EENT


Eyes: no abnormal pupil, no anicteric sclerae, no disc margins sharp, no 

edentulous, no EOMI, no PERRLA, no fundus normal, no photophobia, no dentition 

normal, no poor dentition, no ptosis, no scleral icterus, normal appearance


ENT: hard of hearing, no hearing grossly normal, no NA/AT, normal oropharynx, 

no other, no pharyngeal erythema, no thrush, no tonsillar exudates, no 

tonsillar swelling


Ears: bilateral: normal, bulging





- Neck


Neck: no lymphadenopathy, normal ROM, no other, no rigidity, no stridor, no 

thyromegaly


Carotids: bilateral: upstroke normal


Thyroid: bilateral: normal size





- Respiratory


Respiratory: bilateral: CTA, diminished





- Cardiovascular


Rhythm: irregularly irregular


Heart sounds: normal: S1, S2


Abnormal Heart Sounds: systolic murmur, no diastolic murmur, no rub, S3 Gallop, 

no S4 Gallop, no click, no other





- Gastrointestinal


General gastrointestinal: no absent bowel sounds, decreased bowel sounds, 

distended, no hepatomegaly, no hyperactive bowel sounds, no normal bowel sounds

, no organomegaly, no rigid, scaphoid, soft, no splenomegaly, no tenderness, no 

umbilical hernia, no ventral hernia





- Integumentary





Left hip and groin area quite bit painful mild deformity and left leg has an 

external rotation with short leg the left compared to the right.


Integumentary: no calor, no cellulitis, no cyanotic, no decreased turgor, no 

flushed, no jaundiced, normal, no normal turgor, pale, rash, no ulcer





- Neurologic


Neurologic: CNII-XII intact





- Musculoskeletal


Musculoskeletal: gait normal, generalized weakness, strength equal bilaterally, 

no right sided weakness, no left sided weakness





- Psychiatric


Psychiatric: A&O x's 3, appropriate affect, no intact judgment & insight





Results


CBC & Chem 7: 


 05/11/17 00:24





 05/11/17 00:24





Assessment and Plan


Plan: 





1 left hip fracture: Patient will be going for surgical intervention most 

likely ORIF and intertrochanteric nail of the left hip.





2 recent history of fluid overload and acute pulmonary edema secondary to 

diastolic congestive heart failure been off diuretics.  Patient was continue on 

total of 40 mg of furosemide in the morning and 20 in the evening which will be 

continued in the hospital this time.





3 Recent history of GI bleed: Has been off anticoagulation with hemoglobin is 

up to 9.3 with no active bleed currently.





4 CAD: Post PCI of the LAD has been on secondary prevention been on metoprolol, 

Lipitor, losartan and aspirin.





5 history of CVA: Affected the left frontal lobe brought by NASRA paulson with RVR her 

Eliquis quest was hold from the time she had the bleed despite the risk 

involved with her current history and recent bleed we need to hold her 

anticoagulation at this time.





6 PAD: With mild carotid stenosis patient has been on secondary prevention no 

surgical intervention required.





7 hypertension: Remain on losartan 50 mg a day along with metoprolol 25 g twice 

a day and hydralazine 75 g twice a day.





8 hyperlipidemia: Continue Lipitor at 40 mg daily.





9 NASRA paulson with RVR: Pulse rate has been well controlled on metoprolol but 

patient is off anticoagulation.





10 COPD/asthma: Remain on DuoNeb along with Singulair and O2 as needed.





11 anemia: With acute blood loss secondary to GI bleed posttransfusion few days 

ago has hemoglobin up to 9.3 continue secondary prevention with PPI on 

omeprazole 40 mg daily.





12 DVT prophylaxis: Patient will be on heparin subcutaneous post surgery.





13 GI prophylaxis: Patient will continue on omeprazole.





CODE STATUS: Full code.





Dr. Reis thank you much for the consult if I can be any further help to please 

let me know thank you.

## 2017-05-11 NOTE — ED
Fall HPI





- General


Stated Complaint: fall


Time Seen by Provider: 05/11/17 00:02


Source: patient, EMS, RN notes reviewed





- History of Present Illness


Initial Comments: 





This is a 71-year-old female who is going to the bathroom prior to admission 

when she lost her balance and fell.  She felt a carpeted floor she complains 

only of left hip pain no head neck or back pain she was brought in by EMS for 

evaluation.  The pain is mild-to-moderate this time she does not require any 

pain medication.


MD Complaint: fall





- Related Data


 Home Medications











 Medication  Instructions  Recorded  Confirmed


 


Montelukast Sodium [Singulair] 10 mg PO HS 09/20/14 05/11/17


 


Metoprolol Tartrate [Lopressor] 25 mg PO Q8H 12/05/15 05/11/17


 


Nitroglycerin Sl Tabs [Nitrostat] 0.4 mg SUBLINGUAL Q5M PRN 12/05/15 05/11/17


 


Atorvastatin [Lipitor] 40 mg PO HS 12/06/15 05/11/17


 


Ergocalciferol [Vitamin D2 50,000 unit PO ARGUETA 03/15/17 05/11/17





(DRISDOL)]   


 


Ipratropium-Albuterol Nebulize 3 ml INHALATION RT-Q6H PRN 03/15/17 05/11/17





[Duoneb 0.5 mg-3 mg/3 ml Soln]   


 


Melatonin 3 mg PO HS 03/15/17 05/11/17


 


Multivitamins, Thera [Multivitamin 1 tab PO DAILY 03/15/17 05/11/17





(formulary)]   


 


Omeprazole 40 mg PO DAILY 03/15/17 05/11/17


 


amLODIPine [Norvasc] 10 mg PO DAILY 03/15/17 05/11/17


 


hydrALAZINE HCL [Apresoline] 75 mg PO BID 03/15/17 05/11/17


 


Aspirin 81 mg PO DAILY 04/04/17 05/11/17


 


Losartan Potassium [Cozaar] 100 mg PO DAILY 04/04/17 05/11/17


 


Potassium Chloride ER [K-Dur 20] 20 meq PO DAILY 04/04/17 05/11/17








 Previous Rx's











 Medication  Instructions  Recorded


 


Sucralfate [Carafate] 1 gm PO BID #600 ml 03/20/17


 


Ferrous Sulfate [Iron (65  mg PO W/LUNCH  tab 04/08/17





Elemental)]  


 


Cephalexin [Keflex] 500 mg PO Q8HR #21 cap 04/29/17


 


Furosemide [Lasix] 60 mg PO BID@0900,1600  tab 05/10/17


 


oxyCODONE HCL [OxyIR] 10 mg PO Q8H PRN #90 tab 05/10/17











 Allergies











Allergy/AdvReac Type Severity Reaction Status Date / Time


 


diphenhydramine Allergy  Unknown Verified 05/11/17 00:10





[From Benadryl]     


 


morphine Allergy  Rash/Hives Verified 05/11/17 00:10


 


pregabalin [From Lyrica] Allergy  Unknown Verified 05/11/17 00:10


 


Sulfa (Sulfonamide Allergy  Unknown Verified 05/11/17 00:10





Antibiotics)     














Review of Systems


ROS Statement: 


Those systems with pertinent positive or pertinent negative responses have been 

documented in the HPI.





ROS Other: All systems not noted in ROS Statement are negative.





Past Medical History


Past Medical History: Atrial Fibrillation, Asthma, Coronary Artery Disease (CAD)

, COPD, CVA/TIA, GERD/Reflux, GI Bleed, Hyperlipidemia, Hypertension, Mitral 

Valve Prolapse (MVP), Musculoskeletal Disorder, Osteoarthritis (OA), 

Respiratory Disorder, Vascular Disorder


Additional Past Medical History / Comment(s): diverticulitits


Last Myocardial Infarction Date:: unknown


History of Any Multi-Drug Resistant Organisms: None Reported


Past Surgical History: Cholecystectomy, Heart Catheterization With Stent, 

Hysterectomy, Orthopedic Surgery


Additional Past Surgical History / Comment(s): carotid, back sx


Past Anesthesia/Blood Transfusion Reactions: No Reported Reaction


Date of Last Stent Placement:: 2011


Past Psychological History: No Psychological Hx Reported


Smoking Status: Current some day smoker


Past Alcohol Use History: None Reported


Past Drug Use History: None Reported





- Past Family History


  ** Son(s)


Family Medical History: No Reported History





  ** Daughter(s)


Family Medical History: No Reported History





  ** Mother


Family Medical History: Congestive Heart Failure (CHF)





  ** Father


Family Medical History: Cancer


Additional Family Medical History / Comment(s): Bone ca





  ** Sister(s)


Family Medical History: Coronary Artery Disease (CAD)





  ** Brother(s)


Family Medical History: Coronary Artery Disease (CAD), Myocardial Infarction (MI

)





General Exam





- General Exam Comments


Initial Comments: 





This is a well-developed well-nourished awake alert oriented history female she 

is demonstrating a [place] Coma Scale of 15


General appearance: alert, in no apparent distress


Head exam: Present: atraumatic, normocephalic, normal inspection


Eye exam: Present: normal appearance, PERRL, EOMI.  Absent: scleral icterus, 

conjunctival injection, periorbital swelling


ENT exam: Present: normal exam, mucous membranes moist


Neck exam: Present: normal inspection.  Absent: tenderness, meningismus, 

lymphadenopathy


Respiratory exam: Present: normal lung sounds bilaterally.  Absent: respiratory 

distress, wheezes, rales, rhonchi, stridor


Cardiovascular Exam: Present: tachycardia, irregular rhythm.  Absent: systolic 

murmur, diastolic murmur, rubs, gallop, clicks


GI/Abdominal exam: Present: soft, normal bowel sounds.  Absent: distended, 

tenderness, guarding, rebound, rigid


Extremities exam: Present: tenderness, normal capillary refill, other (Is 

palpation of left hip with some evidence of shortening and lateral rotation of 

the left lower extremity.).  Absent: pedal edema, joint swelling, calf 

tenderness


Back exam: Present: normal inspection


Neurological exam: Present: alert, oriented X3, CN II-XII intact


Psychiatric exam: Present: normal affect, normal mood


Skin exam: Present: warm, dry, intact, normal color.  Absent: rash





Course


 Vital Signs











  05/11/17





  00:04


 


Temperature 98.1 F


 


Pulse Rate 127 H


 


Respiratory 20





Rate 


 


Blood Pressure 161/72


 


O2 Sat by Pulse 93 L





Oximetry 














Medical Decision Making





- Medical Decision Making





I did discuss case with Dr. Reis and with the patient patient will be admitted 

with consultation by medicine for medical pressure surgery patient does have 

rapid atrial fibrillation she will be placed on a Cardizem drip at this time.





- EKG Data


-: EKG Interpreted by Me (Page fibrillation with a rate of 129 QRS duration 76 

daily since QTC of 334)





- Radiology Data


Radiology results: report reviewed (I did review the imaging and report is 

pending at this time there is evidence of a left intertrochanteric hip fracture.

), image reviewed





Disposition


Clinical Impression: 


 Fall, Intertrochanteric fracture of left hip, Rapid atrial fibrillation





Disposition: ADMITTED AS IP TO THIS [facility]


Condition: Stable

## 2017-05-11 NOTE — XR
EXAM:

  XR Chest, 1 View

 

CLINICAL HISTORY:

  Reason: Pain

 

TECHNIQUE:

  Frontal view of the chest.

 

COMPARISON:

Chest x-ray 5/7/2017.

 

FINDINGS:

  Lungs:  Redemonstrated increased lung markings, which may be related to 

chronic interstitial lung disease with or without a superimposed 

component of vascular congestion.  Subtle perihilar airspace disease.  

Peribronchial wall thickening.

  Pleural space:  Unremarkable.  No pneumothorax.

  Heart:  Enlarged cardiac silhouette.

  Mediastinum:  Unremarkable.

  Bones/joints:  Moderate degenerative changes of the right 

acromioclavicular joint.

  Vasculature:  Aortic calcifications.

 

IMPRESSION:     

1.  Redemonstrated increased lung markings, which may be related to 

chronic interstitial lung disease with or without a superimposed 

component of vascular congestion.  

2.  Subtle perihilar airspace disease.  

3.  Peribronchial wall thickening.  

4.  Enlarged cardiac silhouette.

## 2017-05-12 LAB
CELLS COUNTED: 100
CH: 24.8
CHCM: 29.5
ERYTHROCYTE [DISTWIDTH] IN BLOOD BY AUTOMATED COUNT: 3.59 M/UL (ref 3.8–5.4)
ERYTHROCYTE [DISTWIDTH] IN BLOOD: 20.1 % (ref 11.5–15.5)
HCT VFR BLD AUTO: 30.2 % (ref 34–46)
HDW: 3.55
HGB BLD-MCNC: 8.8 GM/DL (ref 11.4–16)
MCH RBC QN AUTO: 24.5 PG (ref 25–35)
MCHC RBC AUTO-ENTMCNC: 29.1 G/DL (ref 31–37)
MCV RBC AUTO: 84.2 FL (ref 80–100)
WBC # BLD AUTO: 17.1 K/UL (ref 3.8–10.6)
WBC (PEROX): 17.65

## 2017-05-12 PROCEDURE — 0QS736Z REPOSITION LEFT UPPER FEMUR WITH INTRAMEDULLARY INTERNAL FIXATION DEVICE, PERCUTANEOUS APPROACH: ICD-10-PCS

## 2017-05-12 RX ADMIN — HYDROMORPHONE HYDROCHLORIDE PRN MG: 1 INJECTION, SOLUTION INTRAMUSCULAR; INTRAVENOUS; SUBCUTANEOUS at 11:07

## 2017-05-12 RX ADMIN — ATORVASTATIN CALCIUM SCH MG: 40 TABLET, FILM COATED ORAL at 21:48

## 2017-05-12 RX ADMIN — HEPARIN SODIUM SCH: 5000 INJECTION, SOLUTION INTRAVENOUS; SUBCUTANEOUS at 21:36

## 2017-05-12 RX ADMIN — POTASSIUM CHLORIDE SCH MEQ: 20 TABLET, EXTENDED RELEASE ORAL at 09:41

## 2017-05-12 RX ADMIN — HYDROMORPHONE HYDROCHLORIDE ONE MG: 1 INJECTION, SOLUTION INTRAMUSCULAR; INTRAVENOUS; SUBCUTANEOUS at 17:20

## 2017-05-12 RX ADMIN — CEFAZOLIN SCH MLS/HR: 330 INJECTION, POWDER, FOR SOLUTION INTRAMUSCULAR; INTRAVENOUS at 01:14

## 2017-05-12 RX ADMIN — METOPROLOL TARTRATE SCH MG: 25 TABLET, FILM COATED ORAL at 09:41

## 2017-05-12 RX ADMIN — Medication SCH: at 11:10

## 2017-05-12 RX ADMIN — FUROSEMIDE SCH MG: 20 TABLET ORAL at 09:41

## 2017-05-12 RX ADMIN — Medication SCH MG: at 21:48

## 2017-05-12 RX ADMIN — DILTIAZEM HYDROCHLORIDE SCH MLS: 5 INJECTION INTRAVENOUS at 15:19

## 2017-05-12 RX ADMIN — THERA TABS SCH: TAB at 11:10

## 2017-05-12 RX ADMIN — METOPROLOL TARTRATE SCH MG: 25 TABLET, FILM COATED ORAL at 23:22

## 2017-05-12 RX ADMIN — DILTIAZEM HYDROCHLORIDE ONE MLS/HR: 5 INJECTION INTRAVENOUS at 23:22

## 2017-05-12 RX ADMIN — CEFAZOLIN SCH: 330 INJECTION, POWDER, FOR SOLUTION INTRAMUSCULAR; INTRAVENOUS at 11:10

## 2017-05-12 RX ADMIN — HEPARIN SODIUM SCH UNIT: 5000 INJECTION, SOLUTION INTRAVENOUS; SUBCUTANEOUS at 08:10

## 2017-05-12 RX ADMIN — IPRATROPIUM BROMIDE AND ALBUTEROL SULFATE SCH: .5; 3 SOLUTION RESPIRATORY (INHALATION) at 17:38

## 2017-05-12 RX ADMIN — SUCRALFATE SCH GM: 1 TABLET ORAL at 21:48

## 2017-05-12 RX ADMIN — IPRATROPIUM BROMIDE AND ALBUTEROL SULFATE SCH: .5; 3 SOLUTION RESPIRATORY (INHALATION) at 11:25

## 2017-05-12 RX ADMIN — FUROSEMIDE SCH MG: 20 TABLET ORAL at 21:48

## 2017-05-12 RX ADMIN — HYDROMORPHONE HYDROCHLORIDE ONE MG: 1 INJECTION, SOLUTION INTRAMUSCULAR; INTRAVENOUS; SUBCUTANEOUS at 17:32

## 2017-05-12 RX ADMIN — METOPROLOL TARTRATE SCH MG: 25 TABLET, FILM COATED ORAL at 06:13

## 2017-05-12 RX ADMIN — PANTOPRAZOLE SODIUM SCH MG: 40 TABLET, DELAYED RELEASE ORAL at 06:13

## 2017-05-12 RX ADMIN — HEPARIN SODIUM SCH UNIT: 5000 INJECTION, SOLUTION INTRAVENOUS; SUBCUTANEOUS at 23:22

## 2017-05-12 RX ADMIN — HYDROMORPHONE HYDROCHLORIDE PRN MG: 1 INJECTION, SOLUTION INTRAMUSCULAR; INTRAVENOUS; SUBCUTANEOUS at 23:26

## 2017-05-12 RX ADMIN — MONTELUKAST SODIUM SCH MG: 10 TABLET, FILM COATED ORAL at 21:48

## 2017-05-12 RX ADMIN — POTASSIUM CHLORIDE SCH: 14.9 INJECTION, SOLUTION INTRAVENOUS at 21:36

## 2017-05-12 RX ADMIN — CEFAZOLIN SCH: 330 INJECTION, POWDER, FOR SOLUTION INTRAMUSCULAR; INTRAVENOUS at 10:55

## 2017-05-12 RX ADMIN — HEPARIN SODIUM SCH UNIT: 5000 INJECTION, SOLUTION INTRAVENOUS; SUBCUTANEOUS at 01:13

## 2017-05-12 RX ADMIN — DOCUSATE SODIUM AND SENNOSIDES SCH EACH: 50; 8.6 TABLET ORAL at 21:48

## 2017-05-12 RX ADMIN — LOSARTAN POTASSIUM SCH MG: 50 TABLET, FILM COATED ORAL at 09:41

## 2017-05-12 RX ADMIN — SUCRALFATE SCH: 1 TABLET ORAL at 10:57

## 2017-05-12 RX ADMIN — HYDROMORPHONE HYDROCHLORIDE PRN MG: 1 INJECTION, SOLUTION INTRAMUSCULAR; INTRAVENOUS; SUBCUTANEOUS at 20:20

## 2017-05-12 RX ADMIN — IPRATROPIUM BROMIDE AND ALBUTEROL SULFATE SCH: .5; 3 SOLUTION RESPIRATORY (INHALATION) at 20:35

## 2017-05-12 RX ADMIN — IPRATROPIUM BROMIDE AND ALBUTEROL SULFATE SCH: .5; 3 SOLUTION RESPIRATORY (INHALATION) at 09:47

## 2017-05-12 RX ADMIN — HYDROMORPHONE HYDROCHLORIDE PRN MG: 1 INJECTION, SOLUTION INTRAMUSCULAR; INTRAVENOUS; SUBCUTANEOUS at 06:46

## 2017-05-12 NOTE — P.OP
Date of Procedure: 05/12/17


Procedure(s) Performed: 


PREOPERATIVE DIAGNOSIS: Left hip intertrochanteric fracture.





POSTOPERATIVE DIAGNOSIS: Left hip intertrochanteric fracture.





OPERATION: Left hip intertrochanteric fracture closed reduction and 

intramedullary nailing using Synthes IT nail.





ASSISTANT: Joana Murrieta (Assistance with: Patient positioning, retraction, 

exposure, hemostasis, fixation, irrigation, closure, dressing)





ANESTHESIA: Gen.





ESTIMATED BLOOD LOSS: 30 mL.





COMPLICATIONS: None





OPERATIVE FINDINGS: See dictation





INDICATIONS: Mrs. Davis is a 71-year-old female with multiple medical 

problems with a history of left intertrochanteric fracture.  The patient 

presents to the operating room today for closed reduction and intramedullary 

nailing.  I discussed the risks of surgery in detail as being inclusive of but 

not limited to: Bleeding, infection, scarring, discomfort, blood vessel and/or 

nerve damage, need for further surgery, malunion, nonunion, gait disturbance 

including persistent or permanent limp, limb length inequality, arthritis, 

hardware failure, blood clot, pulmonary embolism, death, and other risks.  The 

consent form has been signed.








 PROCEDURE: After appropriate consent was obtained, the patient was taken to 

the operating room and placed in supine position.  Gen. anesthetic was 

administered and after confirmation of adequate anesthesia, the patient was 

carefully placed in the supine position on the operating room table in the 

fracture table. The patient was placed up against a well-padded peroneal post. 

Care was taken to make sure about that all pressure points were adequately 

padded. The affected leg was placed in boot traction and the unaffected leg was 

placed in a well leg chung.





 Using gentle longitudinal distraction as well as adduction and internal 

rotation, the fracture was reduced as assessed by AP and lateral C-arm imaging. 

Once a satisfactory reduction had been obtained, the thigh was prepped and 

draped in the usual aseptic fashion using ChloraPrep. Ioban drape was used for 

the case and the patient received intravenous antibiotics prior to incision. 

Timeout was called, confirming patient identity, side, procedure, and 

administration of antibiotics. 





The incision was then created with a #10 blade just proximal to the greater 

trochanter laterally. It was carried down through skin into the subcutaneous 

tissues and through fascia. Hemostasis was obtained using electrocautery. The 

tip of the greater trochanter was palpated and a guide pin was placed at the 

tip and directed into the femoral shaft as assessed with C-arm imaging. Once 

optimal pin position had been obtained, a 17 mm reamer was used over the guide 

pin to create a path for the IT nail. IT nail selected was assembled to the 

insertion jig on the back table and bushings were checked for accuracy. The 

nail was then inserted using gentle mallet taps until it was fully deployed. 

The amount of rotation of the implant was assessed based on the amount of 

anteversion of the femoral neck. This was rotated to match the patient's 

femoral neck anteversion and the helical blade guide was placed through the 

insertion jig and through an incision on the lateral side of the thigh more 

distal than the first. Once this guide was placed against the lateral cortex of 

the femur, a guide pin was drilled into the central region of the femoral head 

and neck as based on AP and lateral C-arm imaging. Once optimal pin position 

had been obtained, the guidewire was measured and appropriately sized helical 

blade was selected. The path for the helical blade was prepared using a tapered 

reamer. The helical blade was then inserted using gentle mallet taps along the 

guidewire until it was fully deployed. There was no displacement of the 

fracture during this step. The anti-rotation screw was locked down and the 

insertion apparatus for the helical blade was removed. The guide pin was then 

removed. Traction was then removed from the leg and the distal interlock was 

placed through the jig using standard technique. Finally, the insertion jig for 

the nail was removed and final C-arm images were taken and saved in both AP and 

lateral planes. 





The final x-rays showed satisfactory positioning of the implant and good 

reduction of the fracture. The top of the nail was plugged with a small 

quantity of bone wax and the incisions were then thoroughly irrigated with 

normal saline. Final hemostasis was obtained using electrocautery and closure 

of the fascia was performed using 0-Vicryl suture. 2-0 Vicryl suture was used 

in the subcutaneous tissues and staples were used for the skin. Sterile 

dressing was then applied and the patient was carefully removed from the 

fracture table frame and placed onto the stretcher. The patient tolerated the 

procedure well. There were no complications and 30 of blood loss. The patient 

was then subsequently transferred to recovery room in stable condition. Sponge 

and needle counts were correct.

## 2017-05-12 NOTE — XR
FLUOROSCOPY

 

26 seconds of fluoroscopy time were utilized during internal fixation of the left hip. 2 images docum
ent the procedure.

## 2017-05-12 NOTE — ECHOF
Referral Reason:surgical clearance



MEASUREMENTS

--------

HEIGHT: 170.2 cm

WEIGHT: 88.5 kg

BP: 165/70

RVIDd:   2.5 cm     (< 3.3)

IVSd:   1.2 cm     (0.6 - 1.1)

LVIDd:   5.0 cm     (3.9 - 5.3)

LVPWd:   1.3 cm     (0.6 - 1.1)

IVSs:   1.7 cm

LVIDs:   3.0 cm

LVPWs:   1.7 cm

LA Diam:   3.3 cm     (2.7 - 3.8)

LAESV Index (A-L):   41.48 ml/m

Ao Diam:   3.3 cm     (2.0 - 3.7)

AV Cusp:   1.6 cm     (1.5 - 2.6)

MV EXCURSION:   18.709 mm     (> 18.000)

MV EF SLOPE:   120 mm/s     (70 - 150)

EPSS:   0.4 cm

RAP:   5.00 mmHg

RVSP:   31.34 mmHg







FINDINGS

--------

Atrial fibrillation.

This was a technically adequate study.

The left ventricular size is normal.   There is mild 

concentric left ventricular hypertrophy.   Overall left 

ventricular systolic function is low-normal with, an EF 

between 50 - 55 %.   Apical inferior LV wall motion is 

hypokinetic.

The right ventricle is normal in size and function.

LA is severely dilated >40 ml/m2

The right atrium is normal in size.

There is mild to moderate aortic valve sclerosis.   

Trace to mild aortic regurgitation.

The mitral valve leaflets are mildly thickened.   Mild 

mitral annular calcification present.

Mild tricuspid regurgitation present.   Right 

ventricular systolic pressure is normal at < 35 mmHg.

The pulmonic valve was not well visualized.

The aortic root size is normal.

The inferior vena cava is mildly dilated.

The pericardium is normal.



CONCLUSIONS

--------

1. Atrial fibrillation.

2. The mitral valve leaflets are mildly thickened.

3. Mild mitral annular calcification present.

4. Mild tricuspid regurgitation present.

5. Right ventricular systolic pressure is normal at < 35 

mmHg.

6. The pulmonic valve was not well visualized.

7. The aortic root size is normal.

8. The inferior vena cava is mildly dilated.

9. The pericardium is normal.

10. This was a technically adequate study.

11. The left ventricular size is normal.

12. There is mild concentric left ventricular hypertrophy.

13. Apical inferior LV wall motion is hypokinetic.

14. The right ventricle is normal in size and function.

15. LA is severely dilated >40 ml/m2

16. There is mild to moderate aortic valve sclerosis.

17. Trace to mild aortic regurgitation.





SONOGRAPHER: Marcela Constantino RDCS

## 2017-05-12 NOTE — P.PN
Subjective








71-year-old  female one of Dr. Hood's patient who was rehospitalized 

this past week with significant hypoxia and shortness of breath consistent with 

chronic diastolic heart failure with her diuretics was hold from the week 

before because of GI bleed and hypotension patient had significant fluid 

retention and significant shortness of breath was hospitalized until the 10th 

when patient was more stable ended up being sent back to the nursing home 

rehab.  Patient earlier the week before was in the hospital with 

gastrointestinal bleed been on anticoagulation for her A. fib her 

anticoagulation was stopped completely at the time.


[place] 

after she left the hospital she doesn't remember having any syncopal episode or 

any sign and symptom of TIA or CVA she tripped and fell on the right side ended 

up having severe pain and discomfort in the left hip area was not been able to 

ambulate and walk ended up coming to the emergency department her x-ray of the 

hip showed intertrochanteric fracture patient will be admitted to Dr. Chato hernandez for surgery from medical standpoint.  Hemoglobin has been much 

better since 2 days ago when she had the transfusion and hemoglobin corrected 

up to 9.5.  Patient also found to be in A. fib with pulse rate running around 

100 beats per minutes.  Line again from early in the week she had significant 

encephalopathy due to hypoxia has improved correcting her anemia and shortness 

of breath.


Patient might be evaluated by cardiology before going for either 

hemiarthroplasty or ORIF.





5/12: Patient is currently on Cardizem drip.  She has been resumed on her home 

dose of Lopressor.  Cardiology consult in place with recommendations to 

maintain the Cardizem drip during the perioperative period.  Norvasc was added 

for accelerated hypertension.  Echocardiogram reveals mild tricuspid 

regurgitation, mild concentric left ventricular hypertrophy L a severely 

dilated greater than 40, moderate aortic sclerosis, mild aortic regurgitation 

.Patient is scheduled for ORIF this afternoon.





Objective





- Vital Signs


Vital signs: 


 Vital Signs











Temp  97.5 F L  05/12/17 08:11


 


Pulse  102 H  05/12/17 08:11


 


Resp  16   05/12/17 08:11


 


BP  165/70   05/12/17 08:11


 


Pulse Ox  95   05/12/17 08:11








 Intake & Output











 05/11/17 05/12/17 05/12/17





 18:59 06:59 18:59


 


Intake Total 360 1746.167 


 


Balance 360 1746.167 


 


Weight 73.482 kg 88.5 kg 


 


Intake:   


 


  IV  1040 


 


    Diltiazem 125 mg In  40 





    Sodium Chloride 0.9% 100   





    ml @ 5 MG/HR 5 mls/hr IV   





    .Q24H [facility] Rx#:[id number]   


 


    Sodium Chloride 0.9% 1,  1000 





    000 ml @ 125 mls/hr IV .   





    Q8H [facility] Rx#:[id number]   


 


  Intake, IV Titration  106.167 





  Amount   


 


    Diltiazem 125 mg In  106.167 





    Sodium Chloride 0.9% 100   





    ml @ 5 MG/HR 5 mls/hr IV   





    .Q24H [facility] Rx#:[id number]   


 


  Oral 360 600 


 


Other:   


 


  Voiding Method Bedpan Bedpan Bedpan





 Diaper Diaper Diaper


 


  # Voids 0 2 














- Exam





General appearance: no average body habitus, cooperative, no disheveled, no 

mild distress, no morbidly obese, no acute distress, no obese, no severe 

distress, no thin





- EENT


Eyes: no abnormal pupil, no anicteric sclerae, no disc margins sharp, no 

edentulous, no EOMI, no PERRLA, no fundus normal, no photophobia, no dentition 

normal, no poor dentition, no ptosis, no scleral icterus, normal appearance


ENT: hard of hearing, no hearing grossly normal, no NA/AT, normal oropharynx, 

no other, no pharyngeal erythema, no thrush, no tonsillar exudates, no 

tonsillar swelling


Ears: bilateral: normal, bulging





- Neck


Neck: no lymphadenopathy, normal ROM, no other, no rigidity, no stridor, no 

thyromegaly


Carotids: bilateral: upstroke normal


Thyroid: bilateral: normal size





- Respiratory


Respiratory: bilateral: CTA, diminished





- Cardiovascular


Rhythm: irregularly irregular


Heart sounds: normal: S1, S2


Abnormal Heart Sounds: systolic murmur, no diastolic murmur, no rub, S3 Gallop, 

no S4 Gallop, no click, no other





- Gastrointestinal


General gastrointestinal: no absent bowel sounds, decreased bowel sounds, 

distended, no hepatomegaly, no hyperactive bowel sounds, no normal bowel sounds

, no organomegaly, no rigid, scaphoid, soft, no splenomegaly, no tenderness, no 

umbilical hernia, no ventral hernia





- Integumentary





Left hip and groin area quite bit painful mild deformity and left leg has an 

external rotation with short leg the left compared to the right.


Integumentary: no calor, no cellulitis, no cyanotic, no decreased turgor, no 

flushed, no jaundiced, normal, no normal turgor, pale, rash, no ulcer





- Neurologic


Neurologic: CNII-XII intact





- Musculoskeletal


Musculoskeletal: gait normal, generalized weakness, strength equal bilaterally, 

no right sided weakness, no left sided weakness





- Psychiatric


Psychiatric: A&O x's 3, appropriate affect, no intact judgment & insight








- Labs


CBC & Chem 7: 


 05/11/17 00:24





 05/11/17 00:24





Assessment and Plan


Plan: 





1 left hip fracture: Patient will be going for surgical intervention most 

likely ORIF and intertrochanteric nail of the left hip.





2 chronic diastolic heart failure.  Continue Lasix 60 mg twice daily.





3 Recent history of GI bleed: Has been off anticoagulation with hemoglobin is 

up to 9.3 with no active bleed currently.





4 CAD: Post PCI of the LAD has been on secondary prevention been on metoprolol, 

Lipitor, losartan and aspirin.





5 history of CVA: Affected the left frontal lobe brought by NASRA paulson with RVR her 

Eliquis has been on hold due to GI bleed





6 PAD: With mild carotid stenosis patient has been on secondary prevention no 

surgical intervention required.





7 hypertension with episode of accelerated hypertension for which Norvasc has 

been added: Remain on losartan 100 mg a day along with metoprolol 25 g three 

times a day and hydralazine 75 g twice a day.





8 hyperlipidemia: Continue Lipitor at 40 mg daily.





9 NASRA paulson with RVR with chronic atrial fibrillation: Continue Cardizem and 

metoprolol





10 COPD/asthma: Remain on DuoNeb along with Singulair and O2 as needed.





11 anemia: With acute blood loss secondary to GI bleed posttransfusion few days 

ago has hemoglobin up to 9.3 continue secondary prevention with PPI on 

omeprazole 40 mg daily.





12 DVT prophylaxis: Patient will be on heparin subcutaneous post surgery.





13 GI prophylaxis: Patient will continue on omeprazole.





CODE STATUS: Full code.





Discharge plan: Return to [facility]





Impression and plan of care have been directed as dictated by the signing 

physician.  Danielle Herbert nurse practitioner acting as scribe for signing 

physician.

## 2017-05-12 NOTE — P.CRDCN
History of Present Illness


Consult date: 05/12/17


Requesting physician: Giancarlo Chambers


Consult reason: atrial fibrillation


Chief complaint: Fall


History of present illness: 





This is a pleasant 71-year-old  female with known history of coronary 

artery disease and prior LAD stenting, hypertension, hyperlipidemia, chronic 

renal failure,prior CVA, PAD with prior carotid endarterectomy, chronic 

persistent atrial fibrillation, who was in the hospital recently with GI bleed 

and hypotension.  Patient was discharged home from the hospital and then 

readmitted this past week with significant hypoxia and associated shortness of 

breath consistent with diastolic congestive heart failure.   again was 

discharged back to the nursing home for rehab, and apparently had a fall in the 

bathroom.  For this reason she was again readmitted to the hospital.  Straight 

performed on admission here revealed an acute mildly displaced anterior 

trochanter proximal diaphyseal fracture of the left femur.  Patient was seen by 

orthopedics and is scheduled to undergo surgery today.  EKG on admission showed 

atrial fibrillation with a rapid ventricular response, and patient was 

initiated on IV Cardizem which she continues to be on at this time.  Her heart 

rate is currently in the 70s to 80s.  Blood pressure 154/70 with a heart rate 

in the 80s.  95% on 2 L of oxygen.  White blood cell count 14.5, hemoglobin 9.5

, potassium 3.4, BUN 45, creatinine 1.0.  Magnesium 2.2.  Patient was initiated 

on Norvasc this morning for accelerated hypertension.





Past Medical History


Past Medical History: Atrial Fibrillation, Asthma, Coronary Artery Disease (CAD)

, Heart Failure, COPD, CVA/TIA, GERD/Reflux, GI Bleed, Hyperlipidemia, 

Hypertension, Myocardial Infarction (MI), Mitral Valve Prolapse (MVP), 

Musculoskeletal Disorder, Osteoarthritis (OA), Pneumonia, Respiratory Disorder, 

Vascular Disorder


Additional Past Medical History / Comment(s): Pt recently admitted 5/7/17 with 

metabolic encephalopathy due to hypoxia/SOB, chronic blood loss anemia/GI bleed/

hemotysis.     Other hx:  Bronchitis, diverticulitits, lower GI bleed, anemia, 

MVP, PAD, L caratid disease, silent MI, CVA L frontal.


Last Myocardial Infarction Date:: unknown


History of Any Multi-Drug Resistant Organisms: None Reported


Past Surgical History: Adenoidectomy, Appendectomy, Back Surgery, Bladder 

Surgery, Cholecystectomy, Heart Catheterization With Stent, Hysterectomy, 

Orthopedic Surgery, Tonsillectomy, Tubal Ligation


Additional Past Surgical History / Comment(s): R carotid, cervical epidural 

injections, angiogram, sinus sx, EGD/colonoscopy, bladder suspension, stent L 

leg.


Past Anesthesia/Blood Transfusion Reactions: No Reported Reaction


Additional Past Anesthesia/Blood Transfusion Reaction / Comment(s): Pt has 

received blood in past without reaction.


Date of Last Stent Placement:: 2011


Past Psychological History: No Psychological Hx Reported


Additional Psychological History / Comment(s): [place] [facility] 

[place]  She [place] O2 at HS.  She ambulates with assistance.  She feeds 

herself. She needs assistance with other ADLs.


Smoking Status: Former smoker


Past Alcohol Use History: None Reported


Additional Past Alcohol Use History / Comment(s): Pt started smoking in 1958 

and she states she recently quit.


Past Drug Use History: None Reported





- Past Family History


  ** Son(s)


Family Medical History: No Reported History





  ** Daughter(s)


Family Medical History: No Reported History





  ** Mother


Family Medical History: Congestive Heart Failure (CHF)





  ** Father


Family Medical History: Cancer


Additional Family Medical History / Comment(s): Bone ca





  ** Sister(s)


Family Medical History: Coronary Artery Disease (CAD)





  ** Brother(s)


Family Medical History: Coronary Artery Disease (CAD), Myocardial Infarction (MI

)





Medications and Allergies


 Home Medications











 Medication  Instructions  Recorded  Confirmed  Type


 


Montelukast Sodium [Singulair] 10 mg PO HS@2100 09/20/14 05/11/17 History


 


Metoprolol Tartrate [Lopressor] 25 mg PO TID@0600,1400,2200 12/05/15 05/11/17 

History


 


Nitroglycerin Sl Tabs [Nitrostat] 0.4 mg SUBLINGUAL Q5M PRN 12/05/15 05/11/17 

History


 


Atorvastatin [Lipitor] 40 mg PO HS 12/06/15 05/11/17 History


 


Ergocalciferol [Vitamin D2 50,000 unit PO ARGUETA 03/15/17 05/11/17 History





(DRISDOL)]    


 


Ipratropium-Albuterol Nebulize 3 ml INHALATION RT-Q6H PRN 03/15/17 05/11/17 

History





[Duoneb 0.5 mg-3 mg/3 ml Soln]    


 


Melatonin 3 mg PO HS 03/15/17 05/11/17 History


 


Multivitamins, Thera [Multivitamin 1 tab PO DAILY 03/15/17 05/11/17 History





(formulary)]    


 


Omeprazole 40 mg PO DAILY@0600 03/15/17 05/11/17 History


 


amLODIPine [Norvasc] 10 mg PO DAILY 03/15/17 05/11/17 History


 


hydrALAZINE HCL [Apresoline] 75 mg PO BID@0900,2100 03/15/17 05/11/17 History


 


Aspirin 81 mg PO DAILY 04/04/17 05/11/17 History


 


Losartan Potassium [Cozaar] 100 mg PO DAILY@0900 04/04/17 05/11/17 History


 


Potassium Chloride ER [K-Dur 20] 20 meq PO DAILY 04/04/17 05/11/17 History


 


Cephalexin [Keflex] 500 mg PO TID@0600,1400,2200 05/11/17 05/11/17 History


 


Ferrous Sulfate [Iron (65  mg PO DAILY@1200 05/11/17 05/11/17 History





Elemental)]    


 


Furosemide [Lasix] 60 mg PO BID 05/11/17 05/11/17 History


 


Sucralfate [Carafate] 1 gm PO BID@0900,2100 05/11/17 05/11/17 History











 Allergies











Allergy/AdvReac Type Severity Reaction Status Date / Time


 


diphenhydramine Allergy  Unknown Verified 05/11/17 07:44





[From Benadryl]     


 


morphine Allergy  Rash/Hives Verified 05/11/17 07:44


 


pregabalin [From Lyrica] Allergy  Unknown Verified 05/11/17 07:44


 


Sulfa (Sulfonamide Allergy  Unknown Verified 05/11/17 07:44





Antibiotics)     














Physical Exam


Vitals: 


 Vital Signs











  Temp Pulse Pulse Resp BP Pulse Ox


 


 05/12/17 08:11  97.5 F L   82  16  165/70  95


 


 05/12/17 04:00  97.7 F   102 H  16  154/71  94 L


 


 05/12/17 00:00  98.3 F   89  16  121/54  92 L


 


 05/11/17 20:00  98.2 F   110 H  18  133/58  94 L


 


 05/11/17 16:12   100    


 


 05/11/17 16:00  98.5 F   110 H  18  128/74  94 L


 


 05/11/17 15:56   96     92 L


 


 05/11/17 14:33  98.1 F   107 H  18  151/74  92 L


 


 05/11/17 12:16   100    


 


 05/11/17 12:06   104 H    


 


 05/11/17 11:23  97.7 F   109 H  18  172/64  97








 Intake and Output











 05/11/17 05/12/17 05/12/17





 22:59 06:59 14:59


 


Intake Total 717.403 2600 


 


Balance 011.639 3230 


 


Intake:   


 


  IV  1040 


 


    Diltiazem 125 mg In  40 





    Sodium Chloride 0.9% 100   





    ml @ 5 MG/HR 5 mls/hr IV   





    .Q24H [facility] Rx#:[id number]   


 


    Sodium Chloride 0.9% 1,  1000 





    000 ml @ 125 mls/hr IV .   





    Q8H [facility] Rx#:[id number]   


 


  Intake, IV Titration 106.167  





  Amount   


 


    Diltiazem 125 mg In 106.167  





    Sodium Chloride 0.9% 100   





    ml @ 5 MG/HR 5 mls/hr IV   





    .Q24H [facility] Rx#:[id number]   


 


  Oral 840  


 


Other:   


 


  Voiding Method Bedpan Bedpan Bedpan





 Diaper Diaper Diaper


 


  # Voids 2 2 


 


  Weight  88.5 kg 














PHYSICAL EXAMINATION: 





HEENT: Head is atraumatic, normocephalic.  Pupils equal, round.  Neck is 

supple.  There is  elevated jugular venous pressure.  Right carotid bruit.





HEART EXAMINATION: S1 and S2 irregularly irregular





CHEST EXAMINATION: Lungs reveal decreased air exchange bilaterally.





ABDOMEN:  Soft, nontender. Bowel sounds are heard. No organomegaly noted.


 


EXTREMITIES: 2+ peripheral pulses with 1+  evidence of peripheral edema and no 

calf tenderness noted.





NEUROLOGIC patient is sleepy , alert and oriented -3.


 





Results





 05/11/17 00:24





 05/11/17 00:24


 Current Medications











Generic Name Dose Route Start Last Admin





  Trade Name Freq  PRN Reason Stop Dose Admin


 


Hydrocodone Bitart/Acetaminophen  1 each  05/11/17 08:31  05/11/17 22:10





  [place] 5-325  PO   1 each





  Q4HR PRN   Administration





  Moderate Pain   


 


Albuterol/Ipratropium  3 ml  05/11/17 08:00  05/12/17 09:47





  Duoneb 0.5 Mg-3 Mg/3 Ml Soln  INHALATION   Not Given





  RT-QID [facility]   


 


Albuterol/Ipratropium  3 ml  05/11/17 16:04  





  Duoneb 0.5 Mg-3 Mg/3 Ml Soln  INHALATION   





  RT-Q6H PRN   





  Shortness Of Breath Or Wheezing   


 


Amlodipine Besylate  10 mg  05/12/17 09:00  05/12/17 09:41





  Norvasc  PO   10 mg





  DAILY [facility]   Administration


 


Atorvastatin Calcium  40 mg  05/11/17 21:00  05/11/17 21:14





  Lipitor  PO   40 mg





  HS [facility]   Administration


 


Diazepam  2 mg  05/11/17 08:32  05/11/17 09:25





  Valium  IVP   2 mg





  Q8HR PRN   Administration





  Muscle Spasm   


 


Ergocalciferol  50,000 unit  05/14/17 12:00  





  Vitamin D2  PO   





  [place]@1200 [facility]   


 


Ferrous Sulfate  325 mg  05/12/17 12:00  05/12/17 11:10





  Feosol  PO   Not Given





  DAILY@1200 [facility]   


 


Furosemide  60 mg  05/11/17 21:00  05/12/17 09:41





  Lasix  PO   60 mg





  BID@0900,1600 [facility]   Administration


 


Heparin Sodium (Porcine)  5,000 unit  05/11/17 08:00  05/12/17 08:10





  Heparin  SQ   5,000 unit





  Q8HR [facility]   Administration


 


Hydralazine HCl  75 mg  05/11/17 21:00  05/12/17 09:41





  Apresoline  PO   75 mg





  BID@0900,2100 [facility]   Administration


 


Hydromorphone HCl  1 mg  05/11/17 01:07  05/12/17 11:07





  Dilaudid  IVP   1 mg





  Q4HR PRN   Administration





  Pain   


 


Sodium Chloride  1,000 mls @ 75 mls/hr  05/12/17 10:15  05/12/17 11:10





  Saline 0.9%  IV   Not Given





  [id number] [facility]   


 


Diltiazem HCl 125 mg/ Sodium  125 mls @ 5 mls/hr  05/12/17 11:15  





  Chloride  IV   





  .Q24H [facility]   





  5 MG/HR   


 


Losartan Potassium  100 mg  05/12/17 09:00  05/12/17 09:41





  Cozaar  PO   100 mg





  DAILY@0900 [facility]   Administration


 


Melatonin  3 mg  05/11/17 21:00  05/11/17 21:14





  Melatonin  PO   3 mg





  HS [facility]   Administration


 


Metoprolol Tartrate  25 mg  05/11/17 22:00  05/12/17 09:41





  Lopressor  PO   25 mg





  TID@0600,1400,2200 [facility]   Administration


 


Montelukast Sodium  10 mg  05/11/17 21:00  05/11/17 22:10





  Singulair  PO   10 mg





  HS@2100 [facility]   Administration


 


Multivitamins  1 each  05/12/17 12:00  05/12/17 11:10





  Theragran  PO   Not Given





  DAILY@1200 [facility]   


 


Naloxone HCl  0.2 mg  05/11/17 01:04  





  Narcan  IV   





  Q2M PRN   





  Opioid Reversal   


 


Nitroglycerin  0.4 mg  05/11/17 16:04  





  Nitrostat  SUBLINGUAL   





  Q5M PRN   





  Chest Pain   


 


Ondansetron HCl  4 mg  05/11/17 01:07  





  Zofran  IVP   





  Q8HR PRN   





  Nausea   


 


Oxycodone HCl  10 mg  05/11/17 16:04  





  Oxyir  PO   





  Q8H PRN   





  Pain   


 


Pantoprazole Sodium  40 mg  05/12/17 06:00  05/12/17 06:13





  Protonix  PO   40 mg





  DAILY@0600 [facility]   Administration


 


Potassium Chloride  20 meq  05/12/17 09:00  05/12/17 09:41





  K-Dur 20  PO   20 meq





  DAILY [facility]   Administration


 


Sucralfate  1 gm  05/11/17 21:00  05/12/17 10:57





  Carafate  PO   Not Given





  BID@0900,2100 [facility]   








 Intake and Output











 05/11/17 05/12/17 05/12/17





 22:59 06:59 14:59


 


Intake Total 554.608 2765 


 


Balance 153.964 5489 


 


Intake:   


 


  IV  1040 


 


    Diltiazem 125 mg In  40 





    Sodium Chloride 0.9% 100   





    ml @ 5 MG/HR 5 mls/hr IV   





    .Q24H [facility] Rx#:[id number]   


 


    Sodium Chloride 0.9% 1,  1000 





    000 ml @ 125 mls/hr IV .   





    Q8H [facility] Rx#:[id number]   


 


  Intake, IV Titration 106.167  





  Amount   


 


    Diltiazem 125 mg In 106.167  





    Sodium Chloride 0.9% 100   





    ml @ 5 MG/HR 5 mls/hr IV   





    .Q24H [facility] Rx#:[id number]   


 


  Oral 840  


 


Other:   


 


  Voiding Method Bedpan Bedpan Bedpan





 Diaper Diaper Diaper


 


  # Voids 2 2 


 


  Weight  88.5 kg 














EKG Interpretations (text)





EKG shows atrial fibrillation with a rapid ventricular response.





Assessment and Plan


Plan: 


Assessment and plan


#1 left hip fracture, patient scheduled to undergo ORIF today.


#2 recent admission with congestive cardiac failure, diastolic in nature.  Most 

recent echo was performed in March which revealed an ejection fraction of 45-50

% with inferior hypokinesis.


#3 recent history of GI bleed, taken off anticoagulation for this reason.


#4 chronic persistent atrial fibrillation, currently on a Cardizem drip.


#5 known history of coronary artery disease with prior stenting of the LAD


#6 accelerated hypertension


#7 history of hypertension


#8 history of prior CVA


#9 hyperlipidemia


#10 COPD


#11 anemia





Plan


From cardiology's perspective, we will recommend to continue the patient on her 

current medications including the Norvasc which has just been added for optimal 

blood pressure control.  We will keep the patient on the Cardizem drip in the 

perioperative period.  We will continue to follow with you.








DNP note has been reviewed, I agree with a documented findings and plan of 

care.  Patient was seen and examined.

## 2017-05-13 LAB
ALP SERPL-CCNC: 74 U/L (ref 38–126)
ALT SERPL-CCNC: 26 U/L (ref 9–52)
ANION GAP SERPL CALC-SCNC: 8 MMOL/L
AST SERPL-CCNC: 21 U/L (ref 14–36)
BUN SERPL-SCNC: 21 MG/DL (ref 7–17)
CALCIUM SPEC-MCNC: 7.7 MG/DL (ref 8.4–10.2)
CH: 25
CHCM: 30.2
CHLORIDE SERPL-SCNC: 98 MMOL/L (ref 98–107)
CO2 SERPL-SCNC: 32 MMOL/L (ref 22–30)
ERYTHROCYTE [DISTWIDTH] IN BLOOD BY AUTOMATED COUNT: 3.21 M/UL (ref 3.8–5.4)
ERYTHROCYTE [DISTWIDTH] IN BLOOD: 19.9 % (ref 11.5–15.5)
GLUCOSE SERPL-MCNC: 74 MG/DL (ref 74–99)
HCT VFR BLD AUTO: 26.7 % (ref 34–46)
HDW: 3.66
HGB BLD-MCNC: 8.1 GM/DL (ref 11.4–16)
MCH RBC QN AUTO: 25.3 PG (ref 25–35)
MCHC RBC AUTO-ENTMCNC: 30.4 G/DL (ref 31–37)
MCV RBC AUTO: 83.2 FL (ref 80–100)
NON-AFRICAN AMERICAN GFR(MDRD): >60
POTASSIUM SERPL-SCNC: 3.1 MMOL/L (ref 3.5–5.1)
PROT SERPL-MCNC: 4.9 G/DL (ref 6.3–8.2)
SODIUM SERPL-SCNC: 138 MMOL/L (ref 137–145)
WBC # BLD AUTO: 14.6 K/UL (ref 3.8–10.6)

## 2017-05-13 RX ADMIN — POTASSIUM CHLORIDE SCH MEQ: 20 TABLET, EXTENDED RELEASE ORAL at 08:44

## 2017-05-13 RX ADMIN — IPRATROPIUM BROMIDE AND ALBUTEROL SULFATE SCH ML: .5; 3 SOLUTION RESPIRATORY (INHALATION) at 07:38

## 2017-05-13 RX ADMIN — FUROSEMIDE SCH MG: 20 TABLET ORAL at 08:42

## 2017-05-13 RX ADMIN — METOPROLOL TARTRATE SCH MG: 25 TABLET, FILM COATED ORAL at 16:06

## 2017-05-13 RX ADMIN — IPRATROPIUM BROMIDE AND ALBUTEROL SULFATE SCH: .5; 3 SOLUTION RESPIRATORY (INHALATION) at 16:07

## 2017-05-13 RX ADMIN — HEPARIN SODIUM SCH UNIT: 5000 INJECTION, SOLUTION INTRAVENOUS; SUBCUTANEOUS at 08:41

## 2017-05-13 RX ADMIN — ATORVASTATIN CALCIUM SCH MG: 40 TABLET, FILM COATED ORAL at 20:01

## 2017-05-13 RX ADMIN — IPRATROPIUM BROMIDE AND ALBUTEROL SULFATE SCH ML: .5; 3 SOLUTION RESPIRATORY (INHALATION) at 12:00

## 2017-05-13 RX ADMIN — SUCRALFATE SCH GM: 1 TABLET ORAL at 20:01

## 2017-05-13 RX ADMIN — CEFAZOLIN SCH: 330 INJECTION, POWDER, FOR SOLUTION INTRAMUSCULAR; INTRAVENOUS at 08:24

## 2017-05-13 RX ADMIN — Medication SCH MG: at 11:54

## 2017-05-13 RX ADMIN — PANTOPRAZOLE SODIUM SCH MG: 40 TABLET, DELAYED RELEASE ORAL at 06:17

## 2017-05-13 RX ADMIN — HEPARIN SODIUM SCH UNIT: 5000 INJECTION, SOLUTION INTRAVENOUS; SUBCUTANEOUS at 16:07

## 2017-05-13 RX ADMIN — HYDROMORPHONE HYDROCHLORIDE PRN MG: 1 INJECTION, SOLUTION INTRAMUSCULAR; INTRAVENOUS; SUBCUTANEOUS at 05:35

## 2017-05-13 RX ADMIN — THERA TABS SCH EACH: TAB at 11:56

## 2017-05-13 RX ADMIN — MONTELUKAST SODIUM SCH MG: 10 TABLET, FILM COATED ORAL at 20:01

## 2017-05-13 RX ADMIN — DOCUSATE SODIUM AND SENNOSIDES SCH EACH: 50; 8.6 TABLET ORAL at 20:01

## 2017-05-13 RX ADMIN — DILTIAZEM HYDROCHLORIDE SCH MLS/HR: 5 INJECTION INTRAVENOUS at 16:13

## 2017-05-13 RX ADMIN — SUCRALFATE SCH GM: 1 TABLET ORAL at 08:44

## 2017-05-13 RX ADMIN — Medication SCH MG: at 20:01

## 2017-05-13 RX ADMIN — LOSARTAN POTASSIUM SCH MG: 50 TABLET, FILM COATED ORAL at 08:43

## 2017-05-13 RX ADMIN — METOPROLOL TARTRATE SCH MG: 25 TABLET, FILM COATED ORAL at 06:17

## 2017-05-13 RX ADMIN — HYDROMORPHONE HYDROCHLORIDE PRN MG: 1 INJECTION, SOLUTION INTRAMUSCULAR; INTRAVENOUS; SUBCUTANEOUS at 16:12

## 2017-05-13 RX ADMIN — FUROSEMIDE SCH MG: 20 TABLET ORAL at 16:06

## 2017-05-13 RX ADMIN — IPRATROPIUM BROMIDE AND ALBUTEROL SULFATE SCH: .5; 3 SOLUTION RESPIRATORY (INHALATION) at 19:49

## 2017-05-13 NOTE — P.PN
Subjective


Principal diagnosis: 





Status post IT nail left hip





This is a 71-year-old female who is status post close reduction with insertion 

of intertrochanteric nail of the left hip.  She is doing well from an 

orthopedic standpoint.  She has no new complaints or concerns today.  She is 

quite sleepy today.





Objective





- Vital Signs


Vital signs: 


 Vital Signs











Temp  98.6 F   05/13/17 04:00


 


Pulse  80   05/13/17 08:02


 


Resp  18   05/13/17 04:00


 


BP  133/60   05/13/17 04:00


 


Pulse Ox  91 L  05/13/17 04:00








 Intake & Output











 05/12/17 05/13/17 05/13/17





 18:59 06:59 18:59


 


Intake Total 2080 2021.917 


 


Output Total 285 1000 


 


Balance 1795 1021.917 


 


Weight  72.5 kg 


 


Intake:   


 


  IV 2080 1000 


 


    Sodium Chloride 0.9% 1,  1000 





    000 ml @ 125 mls/hr IV .   





    Q8H [facility] Rx#:[id number]   


 


  Intake, IV Titration  221.917 





  Amount   


 


    Diltiazem 125 mg In  121.917 





    Sodium Chloride 0.9% 100   





    ml @ 5 MG/HR 5 mls/hr IV   





    .Q24H [facility] Rx#:[id number]   


 


    ceFAZolin 2 gm In Sodium  100 





    Chloride 0.9% 100 ml @   





    100 mls/hr IVPB Q8HR [facility]   





    Rx#:[id number]   


 


  Oral  800 


 


Output:   


 


  Urine 250 1000 


 


  Estimated Blood Loss 35  


 


Other:   


 


  Voiding Method Indwelling Catheter Indwelling Catheter 


 


  # Voids 1 2 














- Exam





This is a 71-year-old female in no acute distress.  She is sleeping soundly.  

She is arousable but does not stay awake throughout the exam.  Exam of the left 

hip reveals that her incisions look good with no erythema or ecchymosis.  There 

is no drainage noted.  Pedal pulses +2/4.  She is not awake enough to follow 

commands at this time.





- Labs


CBC & Chem 7: 


 05/13/17 05:42





 05/13/17 05:42


Labs: 


 Abnormal Lab Results - Last 24 Hours (Table)











  05/12/17 05/13/17 05/13/17 Range/Units





  19:07 05:42 05:42 


 


WBC  17.1 H  14.6 H   (3.8-10.6)  k/uL


 


RBC  3.59 L  3.21 L   (3.80-5.40)  m/uL


 


Hgb  8.8 L  8.1 L   (11.4-16.0)  gm/dL


 


Hct  30.2 L  26.7 L   (34.0-46.0)  %


 


MCH  24.5 L    (25.0-35.0)  pg


 


MCHC  29.1 L  30.4 L   (31.0-37.0)  g/dL


 


RDW  20.1 H  19.9 H   (11.5-15.5)  %


 


Neutrophils # (Manual)  14.4 H    (1.3-7.7)  k/uL


 


Lymphocytes # (Manual)  0.3 L    (1.0-4.8)  k/uL


 


Monocytes # (Manual)  2.4 H    (0-1.0)  k/uL


 


Potassium    3.1 L  (3.5-5.1)  mmol/L


 


Carbon Dioxide    32 H  (22-30)  mmol/L


 


BUN    21 H  (7-17)  mg/dL


 


Calcium    7.7 L  (8.4-10.2)  mg/dL


 


Total Protein    4.9 L  (6.3-8.2)  g/dL


 


Albumin    2.2 L  (3.5-5.0)  g/dL














Assessment and Plan


(1) Intertrochanteric fracture of left hip


Status: Acute   





(2) Rapid atrial fibrillation


Status: Acute   


Plan: 





The clinical findings are discussed the patient and her nurse.  Dressing may be 

changed today.  She does not necessarily need a new dressing if there is no 

drainage.  She is toe-touch weightbearing to the left lower extremity with 

walker.

## 2017-05-14 LAB
ALP SERPL-CCNC: 80 U/L (ref 38–126)
ALT SERPL-CCNC: 19 U/L (ref 9–52)
ANION GAP SERPL CALC-SCNC: 8 MMOL/L
AST SERPL-CCNC: 19 U/L (ref 14–36)
BUN SERPL-SCNC: 34 MG/DL (ref 7–17)
CALCIUM SPEC-MCNC: 8.1 MG/DL (ref 8.4–10.2)
CH: 24.7
CHCM: 29.4
CHLORIDE SERPL-SCNC: 98 MMOL/L (ref 98–107)
CO2 SERPL-SCNC: 32 MMOL/L (ref 22–30)
ERYTHROCYTE [DISTWIDTH] IN BLOOD BY AUTOMATED COUNT: 3.29 M/UL (ref 3.8–5.4)
ERYTHROCYTE [DISTWIDTH] IN BLOOD: 20 % (ref 11.5–15.5)
GLUCOSE SERPL-MCNC: 102 MG/DL (ref 74–99)
HCT VFR BLD AUTO: 27.7 % (ref 34–46)
HDW: 3.46
HGB BLD-MCNC: 8.1 GM/DL (ref 11.4–16)
MCH RBC QN AUTO: 24.6 PG (ref 25–35)
MCHC RBC AUTO-ENTMCNC: 29.2 G/DL (ref 31–37)
MCV RBC AUTO: 84.3 FL (ref 80–100)
NON-AFRICAN AMERICAN GFR(MDRD): 44
POTASSIUM SERPL-SCNC: 4.4 MMOL/L (ref 3.5–5.1)
PROT SERPL-MCNC: 5 G/DL (ref 6.3–8.2)
SODIUM SERPL-SCNC: 138 MMOL/L (ref 137–145)
WBC # BLD AUTO: 16.3 K/UL (ref 3.8–10.6)

## 2017-05-14 RX ADMIN — DOCUSATE SODIUM AND SENNOSIDES SCH EACH: 50; 8.6 TABLET ORAL at 20:43

## 2017-05-14 RX ADMIN — HYDROCODONE BITARTRATE AND ACETAMINOPHEN PRN EACH: 5; 325 TABLET ORAL at 13:15

## 2017-05-14 RX ADMIN — CEFAZOLIN SCH: 330 INJECTION, POWDER, FOR SOLUTION INTRAMUSCULAR; INTRAVENOUS at 23:56

## 2017-05-14 RX ADMIN — HEPARIN SODIUM SCH UNIT: 5000 INJECTION, SOLUTION INTRAVENOUS; SUBCUTANEOUS at 16:05

## 2017-05-14 RX ADMIN — FUROSEMIDE SCH MG: 20 TABLET ORAL at 16:04

## 2017-05-14 RX ADMIN — IPRATROPIUM BROMIDE AND ALBUTEROL SULFATE SCH: .5; 3 SOLUTION RESPIRATORY (INHALATION) at 09:31

## 2017-05-14 RX ADMIN — DILTIAZEM HYDROCHLORIDE SCH: 5 INJECTION INTRAVENOUS at 16:02

## 2017-05-14 RX ADMIN — CEFAZOLIN SCH MLS/HR: 330 INJECTION, POWDER, FOR SOLUTION INTRAMUSCULAR; INTRAVENOUS at 08:57

## 2017-05-14 RX ADMIN — HYDROCODONE BITARTRATE AND ACETAMINOPHEN PRN EACH: 5; 325 TABLET ORAL at 08:56

## 2017-05-14 RX ADMIN — IPRATROPIUM BROMIDE AND ALBUTEROL SULFATE SCH: .5; 3 SOLUTION RESPIRATORY (INHALATION) at 17:21

## 2017-05-14 RX ADMIN — CEFAZOLIN SCH MLS/HR: 330 INJECTION, POWDER, FOR SOLUTION INTRAMUSCULAR; INTRAVENOUS at 09:00

## 2017-05-14 RX ADMIN — POTASSIUM CHLORIDE SCH: 14.9 INJECTION, SOLUTION INTRAVENOUS at 08:59

## 2017-05-14 RX ADMIN — HYDROMORPHONE HYDROCHLORIDE PRN MG: 1 INJECTION, SOLUTION INTRAMUSCULAR; INTRAVENOUS; SUBCUTANEOUS at 10:29

## 2017-05-14 RX ADMIN — IPRATROPIUM BROMIDE AND ALBUTEROL SULFATE SCH: .5; 3 SOLUTION RESPIRATORY (INHALATION) at 22:14

## 2017-05-14 RX ADMIN — HYDROMORPHONE HYDROCHLORIDE PRN MG: 1 INJECTION, SOLUTION INTRAMUSCULAR; INTRAVENOUS; SUBCUTANEOUS at 20:38

## 2017-05-14 RX ADMIN — Medication SCH MG: at 16:04

## 2017-05-14 RX ADMIN — HYDROCODONE BITARTRATE AND ACETAMINOPHEN PRN EACH: 5; 325 TABLET ORAL at 16:00

## 2017-05-14 RX ADMIN — MONTELUKAST SODIUM SCH MG: 10 TABLET, FILM COATED ORAL at 20:43

## 2017-05-14 RX ADMIN — THERA TABS SCH EACH: TAB at 16:03

## 2017-05-14 RX ADMIN — HEPARIN SODIUM SCH UNIT: 5000 INJECTION, SOLUTION INTRAVENOUS; SUBCUTANEOUS at 23:57

## 2017-05-14 RX ADMIN — METOPROLOL TARTRATE SCH MG: 25 TABLET, FILM COATED ORAL at 06:04

## 2017-05-14 RX ADMIN — HYDROMORPHONE HYDROCHLORIDE PRN MG: 1 INJECTION, SOLUTION INTRAMUSCULAR; INTRAVENOUS; SUBCUTANEOUS at 06:05

## 2017-05-14 RX ADMIN — HYDROCODONE BITARTRATE AND ACETAMINOPHEN PRN EACH: 5; 325 TABLET ORAL at 20:42

## 2017-05-14 RX ADMIN — SUCRALFATE SCH GM: 1 TABLET ORAL at 09:27

## 2017-05-14 RX ADMIN — POTASSIUM CHLORIDE SCH: 14.9 INJECTION, SOLUTION INTRAVENOUS at 08:58

## 2017-05-14 RX ADMIN — IPRATROPIUM BROMIDE AND ALBUTEROL SULFATE SCH: .5; 3 SOLUTION RESPIRATORY (INHALATION) at 11:48

## 2017-05-14 RX ADMIN — HYDROMORPHONE HYDROCHLORIDE PRN MG: 1 INJECTION, SOLUTION INTRAMUSCULAR; INTRAVENOUS; SUBCUTANEOUS at 16:14

## 2017-05-14 RX ADMIN — POTASSIUM CHLORIDE SCH MEQ: 20 TABLET, EXTENDED RELEASE ORAL at 09:27

## 2017-05-14 RX ADMIN — POTASSIUM CHLORIDE SCH: 14.9 INJECTION, SOLUTION INTRAVENOUS at 09:24

## 2017-05-14 RX ADMIN — PANTOPRAZOLE SODIUM SCH MG: 40 TABLET, DELAYED RELEASE ORAL at 06:05

## 2017-05-14 RX ADMIN — Medication SCH MG: at 20:43

## 2017-05-14 RX ADMIN — HEPARIN SODIUM SCH: 5000 INJECTION, SOLUTION INTRAVENOUS; SUBCUTANEOUS at 01:21

## 2017-05-14 RX ADMIN — METOPROLOL TARTRATE SCH MG: 25 TABLET, FILM COATED ORAL at 16:04

## 2017-05-14 RX ADMIN — HEPARIN SODIUM SCH UNIT: 5000 INJECTION, SOLUTION INTRAVENOUS; SUBCUTANEOUS at 09:25

## 2017-05-14 RX ADMIN — METOPROLOL TARTRATE SCH MG: 25 TABLET, FILM COATED ORAL at 20:44

## 2017-05-14 RX ADMIN — SUCRALFATE SCH GM: 1 TABLET ORAL at 20:44

## 2017-05-14 RX ADMIN — LOSARTAN POTASSIUM SCH MG: 50 TABLET, FILM COATED ORAL at 09:28

## 2017-05-14 RX ADMIN — ATORVASTATIN CALCIUM SCH MG: 40 TABLET, FILM COATED ORAL at 20:44

## 2017-05-14 RX ADMIN — METOPROLOL TARTRATE SCH: 25 TABLET, FILM COATED ORAL at 01:21

## 2017-05-14 RX ADMIN — FUROSEMIDE SCH MG: 20 TABLET ORAL at 09:26

## 2017-05-14 NOTE — P.PN
Subjective





71-year-old  female one of Dr. Hood's patient who was rehospitalized 

this past week with significant hypoxia and shortness of breath consistent with 

chronic diastolic heart failure with her diuretics was hold from the week 

before because of GI bleed and hypotension patient had significant fluid 

retention and significant shortness of breath was hospitalized until the 10th 

when patient was more stable ended up being sent back to the nursing home 

rehab.  Patient earlier the week before was in the hospital with 

gastrointestinal bleed been on anticoagulation for her A. fib her 

anticoagulation was stopped completely at the time.


[place] 

after she left the hospital she doesn't remember having any syncopal episode or 

any sign and symptom of TIA or CVA she tripped and fell on the right side ended 

up having severe pain and discomfort in the left hip area was not been able to 

ambulate and walk ended up coming to the emergency department her x-ray of the 

hip showed intertrochanteric fracture patient will be admitted to Dr. Chato hernandez for surgery from medical standpoint.  Hemoglobin has been much 

better since 2 days ago when she had the transfusion and hemoglobin corrected 

up to 9.5.  Patient also found to be in A. fib with pulse rate running around 

100 beats per minutes.  Line again from early in the week she had significant 

encephalopathy due to hypoxia has improved correcting her anemia and shortness 

of breath.


Patient might be evaluated by cardiology before going for either 

hemiarthroplasty or ORIF.





5/12: Patient is currently on Cardizem drip.  She has been resumed on her home 

dose of Lopressor.  Cardiology consult in place with recommendations to 

maintain the Cardizem drip during the perioperative period.  Norvasc was added 

for accelerated hypertension.  Echocardiogram reveals mild tricuspid 

regurgitation, mild concentric left ventricular hypertrophy L a severely 

dilated greater than 40, moderate aortic sclerosis, mild aortic regurgitation 

.Patient is scheduled for ORIF this afternoon.





05/13: Patient is laying down in bed she is complaining of increased pain in 

the left hip, she denies any chest pain, no shortness of breath, she is very 

weak and not participating in even transferring from bed to the bedside chair.





5/14: Patient is laying down in bed she is complaining of increased pain in the 

left hip, she denies any chest pain, shortness breath, she wanted to go back to 

[facility] today, however I expected to the patient that she is currently on a 

Cardizem drip, and she is not ready to go back to [facility] hopefully in the next 

1 or 2 days.





Objective





- Vital Signs


Vital signs: 


 Vital Signs











Temp  98.6 F   05/14/17 04:00


 


Pulse  95   05/14/17 04:00


 


Resp  18   05/14/17 04:00


 


BP  148/64   05/14/17 04:00


 


Pulse Ox  94 L  05/14/17 04:00








 Intake & Output











 05/13/17 05/14/17 05/14/17





 18:59 06:59 18:59


 


Intake Total 640 800 


 


Output Total  700 


 


Balance 640 100 


 


Weight  72 kg 


 


Intake:   


 


  Intake, IV Titration 260 320 





  Amount   


 


    Lactated Ringers 1,000 ml 160 320 





    @ 20 mls/hr IV .Q24H [facility]   





    Rx#:[id number]   


 


    ceFAZolin 2 gm In Sodium 100  





    Chloride 0.9% 100 ml @   





    100 mls/hr IVPB Q8HR [facility]   





    Rx#:[id number]   


 


  Oral 380 480 


 


Output:   


 


  Urine  700 


 


Other:   


 


  Voiding Method Indwelling Catheter Indwelling Catheter 














- Exam





- Exam





General appearance: no average body habitus, cooperative, no disheveled, no 

mild distress, no morbidly obese, no acute distress, no obese, no severe 

distress, no thin





- EENT


Eyes: no abnormal pupil, no anicteric sclerae, no disc margins sharp, no 

edentulous, no EOMI, no PERRLA, no fundus normal, no photophobia, no dentition 

normal, no poor dentition, no ptosis, no scleral icterus, normal appearance


ENT: hard of hearing, no hearing grossly normal, no NA/AT, normal oropharynx, 

no other, no pharyngeal erythema, no thrush, no tonsillar exudates, no 

tonsillar swelling


Ears: bilateral: normal, bulging





- Neck


Neck: no lymphadenopathy, normal ROM, no other, no rigidity, no stridor, no 

thyromegaly


Carotids: bilateral: upstroke normal


Thyroid: bilateral: normal size





- Respiratory


Respiratory: bilateral: CTA, diminished





- Cardiovascular


Rhythm: irregularly irregular


Heart sounds: normal: S1, S2


Abnormal Heart Sounds: systolic murmur, no diastolic murmur, no rub, S3 Gallop, 

no S4 Gallop, no click, no other





- Gastrointestinal


General gastrointestinal: no absent bowel sounds, decreased bowel sounds, 

distended, no hepatomegaly, no hyperactive bowel sounds, no normal bowel sounds

, no organomegaly, no rigid, scaphoid, soft, no splenomegaly, no tenderness, no 

umbilical hernia, no ventral hernia





- Integumentary





Left hip and groin area quite bit painful mild deformity and left leg has an 

external rotation with short leg the left compared to the right.


Integumentary: no calor, no cellulitis, no cyanotic, no decreased turgor, no 

flushed, no jaundiced, normal, no normal turgor, pale, rash, no ulcer





- Neurologic


Neurologic: CNII-XII intact





- Musculoskeletal


Musculoskeletal: gait normal, generalized weakness, strength equal bilaterally, 

no right sided weakness, no left sided weakness





- Psychiatric


Psychiatric: A&O x's 3, appropriate affect, no intact judgment & insight











- Labs


CBC & Chem 7: 


 05/14/17 06:05





 05/14/17 06:05


Labs: 


 Abnormal Lab Results - Last 24 Hours (Table)











  05/14/17 05/14/17 Range/Units





  06:05 06:05 


 


WBC  16.3 H   (3.8-10.6)  k/uL


 


RBC  3.29 L   (3.80-5.40)  m/uL


 


Hgb  8.1 L   (11.4-16.0)  gm/dL


 


Hct  27.7 L   (34.0-46.0)  %


 


MCH  24.6 L   (25.0-35.0)  pg


 


MCHC  29.2 L   (31.0-37.0)  g/dL


 


RDW  20.0 H   (11.5-15.5)  %


 


Plt Count  458 H   (150-450)  k/uL


 


Carbon Dioxide   32 H  (22-30)  mmol/L


 


BUN   34 H  (7-17)  mg/dL


 


Creatinine   1.21 H  (0.52-1.04)  mg/dL


 


Glucose   102 H  (74-99)  mg/dL


 


Calcium   8.1 L  (8.4-10.2)  mg/dL


 


Total Protein   5.0 L  (6.3-8.2)  g/dL


 


Albumin   2.2 L  (3.5-5.0)  g/dL














Assessment and Plan


Plan: 





Assessment and Plan


Plan: 





1 left hip fracture post open reduction and internal fixation with IT nail that 

was done yesterday.  Continue incentive primary, continue current pain 

management, monitor the patient very closely, physical therapy evaluation, 

patient will need to go back to [facility] on the patient is more stable.





2 chronic diastolic heart failure.  Continue Lasix 60 mg twice daily.





3 Recent history of GI bleed: Has been off anticoagulation with hemoglobin is 

up to 9.3 with no active bleed currently.





4 CAD: Post PCI of the LAD has been on secondary prevention been on metoprolol, 

Lipitor, losartan and aspirin.





5 history of CVA: Affected the left frontal lobe brought by NASRA paulson with RVR her 

Eliquis has been on hold due to GI bleed





6 PAD: With mild carotid stenosis patient has been on secondary prevention no 

surgical intervention required.





7 hypertension with episode of accelerated hypertension for which Norvasc has 

been added: Remain on losartan 100 mg a day along with metoprolol 25 g three 

times a day and hydralazine 75 g twice a day.





8 hyperlipidemia: Continue Lipitor at 40 mg daily.





9 NASRA paulson with RVR with chronic atrial fibrillation: Continue Cardizem and 

metoprolol





10 COPD/asthma: Remain on DuoNeb along with Singulair and O2 as needed.





11 anemia: With acute blood loss secondary to GI bleed posttransfusion few days 

ago has hemoglobin up to 9.3 continue secondary prevention with PPI on 

omeprazole 40 mg daily.





12 DVT prophylaxis: Patient will be on heparin subcutaneous post surgery.





13 GI prophylaxis: Patient will continue on omeprazole.





CODE STATUS: Full code.





Discharge plan: Return to [facility] when ready.

## 2017-05-14 NOTE — P.PN
Subjective





71-year-old  female one of Dr. Hood's patient who was rehospitalized 

this past week with significant hypoxia and shortness of breath consistent with 

chronic diastolic heart failure with her diuretics was hold from the week 

before because of GI bleed and hypotension patient had significant fluid 

retention and significant shortness of breath was hospitalized until the 10th 

when patient was more stable ended up being sent back to the nursing home 

rehab.  Patient earlier the week before was in the hospital with 

gastrointestinal bleed been on anticoagulation for her A. fib her 

anticoagulation was stopped completely at the time.


Patient [place] 

after she left the hospital she doesn't remember having any syncopal episode or 

any sign and symptom of TIA or CVA she tripped and fell on the right side ended 

up having severe pain and discomfort in the left hip area was not been able to 

ambulate and walk ended up coming to the emergency department her x-ray of the 

hip showed intertrochanteric fracture patient will be admitted to Dr. Chato hernandez for surgery from medical standpoint.  Hemoglobin has been much 

better since 2 days ago when she had the transfusion and hemoglobin corrected 

up to 9.5.  Patient also found to be in A. fib with pulse rate running around 

100 beats per minutes.  Line again from early in the week she had significant 

encephalopathy due to hypoxia has improved correcting her anemia and shortness 

of breath.


Patient might be evaluated by cardiology before going for either 

hemiarthroplasty or ORIF.





5/12: Patient is currently on Cardizem drip.  She has been resumed on her home 

dose of Lopressor.  Cardiology consult in place with recommendations to 

maintain the Cardizem drip during the perioperative period.  Norvasc was added 

for accelerated hypertension.  Echocardiogram reveals mild tricuspid 

regurgitation, mild concentric left ventricular hypertrophy L a severely 

dilated greater than 40, moderate aortic sclerosis, mild aortic regurgitation 

.Patient is scheduled for ORIF this afternoon.





05/13: Patient is laying down in bed she is complaining of increased pain in 

the left hip, she denies any chest pain, no shortness of breath, she is very 

weak and not participating in even transferring from bed to the bedside chair.





Objective





- Vital Signs


Vital signs: 


 Vital Signs











Temp  98.6 F   05/13/17 04:00


 


Pulse  92   05/13/17 07:38


 


Resp  18   05/13/17 04:00


 


BP  133/60   05/13/17 04:00


 


Pulse Ox  91 L  05/13/17 04:00








 Intake & Output











 05/12/17 05/13/17 05/13/17





 18:59 06:59 18:59


 


Intake Total 2080 2021.917 


 


Output Total 285 1000 


 


Balance 1795 1021.917 


 


Weight  72.5 kg 


 


Intake:   


 


  IV 2080 1000 


 


    Sodium Chloride 0.9% 1,  1000 





    000 ml @ 125 mls/hr IV .   





    Q8H [facility] Rx#:[id number]   


 


  Intake, IV Titration  221.917 





  Amount   


 


    Diltiazem 125 mg In  121.917 





    Sodium Chloride 0.9% 100   





    ml @ 5 MG/HR 5 mls/hr IV   





    .Q24H [facility] Rx#:[id number]   


 


    ceFAZolin 2 gm In Sodium  100 





    Chloride 0.9% 100 ml @   





    100 mls/hr IVPB Q8HR [facility]   





    Rx#:[id number]   


 


  Oral  800 


 


Output:   


 


  Urine 250 1000 


 


  Estimated Blood Loss 35  


 


Other:   


 


  Voiding Method Indwelling Catheter Indwelling Catheter 


 


  # Voids 1 2 














- Exam





- Exam





General appearance: no average body habitus, cooperative, no disheveled, no 

mild distress, no morbidly obese, no acute distress, no obese, no severe 

distress, no thin





- EENT


Eyes: no abnormal pupil, no anicteric sclerae, no disc margins sharp, no 

edentulous, no EOMI, no PERRLA, no fundus normal, no photophobia, no dentition 

normal, no poor dentition, no ptosis, no scleral icterus, normal appearance


ENT: hard of hearing, no hearing grossly normal, no NA/AT, normal oropharynx, 

no other, no pharyngeal erythema, no thrush, no tonsillar exudates, no 

tonsillar swelling


Ears: bilateral: normal, bulging





- Neck


Neck: no lymphadenopathy, normal ROM, no other, no rigidity, no stridor, no 

thyromegaly


Carotids: bilateral: upstroke normal


Thyroid: bilateral: normal size





- Respiratory


Respiratory: bilateral: CTA, diminished





- Cardiovascular


Rhythm: irregularly irregular


Heart sounds: normal: S1, S2


Abnormal Heart Sounds: systolic murmur, no diastolic murmur, no rub, S3 Gallop, 

no S4 Gallop, no click, no other





- Gastrointestinal


General gastrointestinal: no absent bowel sounds, decreased bowel sounds, 

distended, no hepatomegaly, no hyperactive bowel sounds, no normal bowel sounds

, no organomegaly, no rigid, scaphoid, soft, no splenomegaly, no tenderness, no 

umbilical hernia, no ventral hernia





- Integumentary





Left hip and groin area quite bit painful mild deformity and left leg has an 

external rotation with short leg the left compared to the right.


Integumentary: no calor, no cellulitis, no cyanotic, no decreased turgor, no 

flushed, no jaundiced, normal, no normal turgor, pale, rash, no ulcer





- Neurologic


Neurologic: CNII-XII intact





- Musculoskeletal


Musculoskeletal: gait normal, generalized weakness, strength equal bilaterally, 

no right sided weakness, no left sided weakness





- Psychiatric


Psychiatric: A&O x's 3, appropriate affect, no intact judgment & insight











- Labs


CBC & Chem 7: 


 05/14/17 06:05





 05/14/17 06:05


Labs: 


 Abnormal Lab Results - Last 24 Hours (Table)











  05/12/17 05/13/17 05/13/17 Range/Units





  19:07 05:42 05:42 


 


WBC  17.1 H  14.6 H   (3.8-10.6)  k/uL


 


RBC  3.59 L  3.21 L   (3.80-5.40)  m/uL


 


Hgb  8.8 L  8.1 L   (11.4-16.0)  gm/dL


 


Hct  30.2 L  26.7 L   (34.0-46.0)  %


 


MCH  24.5 L    (25.0-35.0)  pg


 


MCHC  29.1 L  30.4 L   (31.0-37.0)  g/dL


 


RDW  20.1 H  19.9 H   (11.5-15.5)  %


 


Neutrophils # (Manual)  14.4 H    (1.3-7.7)  k/uL


 


Lymphocytes # (Manual)  0.3 L    (1.0-4.8)  k/uL


 


Monocytes # (Manual)  2.4 H    (0-1.0)  k/uL


 


Potassium    3.1 L  (3.5-5.1)  mmol/L


 


Carbon Dioxide    32 H  (22-30)  mmol/L


 


BUN    21 H  (7-17)  mg/dL


 


Calcium    7.7 L  (8.4-10.2)  mg/dL


 


Total Protein    4.9 L  (6.3-8.2)  g/dL


 


Albumin    2.2 L  (3.5-5.0)  g/dL














Assessment and Plan


Plan: 





Assessment and Plan


Plan: 





1 left hip fracture post open reduction and internal fixation with IT nail that 

was done yesterday.  Continue incentive primary, continue current pain 

management, monitor the patient very closely, physical therapy evaluation, 

patient will need to go back to [facility] on the patient is more stable.





2 chronic diastolic heart failure.  Continue Lasix 60 mg twice daily.





3 Recent history of GI bleed: Has been off anticoagulation with hemoglobin is 

up to 9.3 with no active bleed currently.





4 CAD: Post PCI of the LAD has been on secondary prevention been on metoprolol, 

Lipitor, losartan and aspirin.





5 history of CVA: Affected the left frontal lobe brought by NASRA paulson with RVR her 

Eliquis has been on hold due to GI bleed





6 PAD: With mild carotid stenosis patient has been on secondary prevention no 

surgical intervention required.





7 hypertension with episode of accelerated hypertension for which Norvasc has 

been added: Remain on losartan 100 mg a day along with metoprolol 25 g three 

times a day and hydralazine 75 g twice a day.





8 hyperlipidemia: Continue Lipitor at 40 mg daily.





9 NASRA paulson with RVR with chronic atrial fibrillation: Continue Cardizem and 

metoprolol





10 COPD/asthma: Remain on DuoNeb along with Singulair and O2 as needed.





11 anemia: With acute blood loss secondary to GI bleed posttransfusion few days 

ago has hemoglobin up to 9.3 continue secondary prevention with PPI on 

omeprazole 40 mg daily.





12 DVT prophylaxis: Patient will be on heparin subcutaneous post surgery.





13 GI prophylaxis: Patient will continue on omeprazole.





CODE STATUS: Full code.





Discharge plan: Return to [facility] when ready.

## 2017-05-14 NOTE — PN
Ms. Davis is a 71-year-old female with history of ischemic heart 

disease, currently atrial fibrillation and admitted to the hospital 

with fractured him. Patient underwent surgery yesterday. Patient is 

lethargic but arousable. Complains of pain, but does not complain of 

any chest pain or shortness of breath. She is currently being treated 

with amlodipine 10 mg, diltiazem IV drip, Lasix, hydralazine, Dilaudid 

for the pain and metoprolol 25 mg p.o. b.i.d.   



Her blood pressure is 130/60, pulse 85, saturation 93%. 

Neck is supple. No JVD. 

HEART: S1 and S2 heard. Distant heart sounds. 

Lungs show some diminished breath sounds. 

EXTREMITIES: No significant edema. 



Lab values showed hemoglobin of 8.1 which dropped from 9.5, potassium 

is 3.1, calcium 7.7.  



PLAN: We will continue current medical therapy. We will discontinue 

Cardizem tomorrow as well intake is increased. Further recommendations 

depend upon the clinical course.

## 2017-05-15 LAB
ALP SERPL-CCNC: 80 U/L (ref 38–126)
ALT SERPL-CCNC: 22 U/L (ref 9–52)
ANION GAP SERPL CALC-SCNC: 7 MMOL/L
AST SERPL-CCNC: 25 U/L (ref 14–36)
BUN SERPL-SCNC: 45 MG/DL (ref 7–17)
CALCIUM SPEC-MCNC: 8.4 MG/DL (ref 8.4–10.2)
CH: 24.5
CHCM: 28.9
CHLORIDE SERPL-SCNC: 101 MMOL/L (ref 98–107)
CO2 SERPL-SCNC: 29 MMOL/L (ref 22–30)
ERYTHROCYTE [DISTWIDTH] IN BLOOD BY AUTOMATED COUNT: 2.95 M/UL (ref 3.8–5.4)
ERYTHROCYTE [DISTWIDTH] IN BLOOD: 19.6 % (ref 11.5–15.5)
GLUCOSE SERPL-MCNC: 83 MG/DL (ref 74–99)
HCT VFR BLD AUTO: 25 % (ref 34–46)
HDW: 3.32
HGB BLD-MCNC: 7.6 GM/DL (ref 11.4–16)
MCH RBC QN AUTO: 25.9 PG (ref 25–35)
MCHC RBC AUTO-ENTMCNC: 30.5 G/DL (ref 31–37)
MCV RBC AUTO: 84.8 FL (ref 80–100)
NON-AFRICAN AMERICAN GFR(MDRD): 37
PH UR: 5 [PH] (ref 5–8)
POTASSIUM SERPL-SCNC: 4.5 MMOL/L (ref 3.5–5.1)
PROT SERPL-MCNC: 4.9 G/DL (ref 6.3–8.2)
SODIUM SERPL-SCNC: 137 MMOL/L (ref 137–145)
SP GR UR: 1.01 (ref 1–1.03)
UA BILLING (MACRO VS. MICRO): (no result)
UROBILINOGEN UR QL STRIP: <2 MG/DL (ref ?–2)
WBC # BLD AUTO: 14.9 K/UL (ref 3.8–10.6)

## 2017-05-15 RX ADMIN — HYDROMORPHONE HYDROCHLORIDE PRN MG: 1 INJECTION, SOLUTION INTRAMUSCULAR; INTRAVENOUS; SUBCUTANEOUS at 06:38

## 2017-05-15 RX ADMIN — LOSARTAN POTASSIUM SCH MG: 50 TABLET, FILM COATED ORAL at 09:02

## 2017-05-15 RX ADMIN — ATORVASTATIN CALCIUM SCH MG: 40 TABLET, FILM COATED ORAL at 20:13

## 2017-05-15 RX ADMIN — IPRATROPIUM BROMIDE AND ALBUTEROL SULFATE SCH: .5; 3 SOLUTION RESPIRATORY (INHALATION) at 11:27

## 2017-05-15 RX ADMIN — DILTIAZEM HYDROCHLORIDE SCH MG: 60 TABLET, FILM COATED ORAL at 20:13

## 2017-05-15 RX ADMIN — HYDROMORPHONE HYDROCHLORIDE PRN MG: 1 INJECTION, SOLUTION INTRAMUSCULAR; INTRAVENOUS; SUBCUTANEOUS at 23:03

## 2017-05-15 RX ADMIN — DOCUSATE SODIUM AND SENNOSIDES SCH EACH: 50; 8.6 TABLET ORAL at 20:13

## 2017-05-15 RX ADMIN — HEPARIN SODIUM SCH UNIT: 5000 INJECTION, SOLUTION INTRAVENOUS; SUBCUTANEOUS at 16:45

## 2017-05-15 RX ADMIN — HEPARIN SODIUM SCH UNIT: 5000 INJECTION, SOLUTION INTRAVENOUS; SUBCUTANEOUS at 09:01

## 2017-05-15 RX ADMIN — Medication SCH MG: at 20:13

## 2017-05-15 RX ADMIN — CEFAZOLIN SCH: 330 INJECTION, POWDER, FOR SOLUTION INTRAMUSCULAR; INTRAVENOUS at 05:35

## 2017-05-15 RX ADMIN — HYDROMORPHONE HYDROCHLORIDE PRN MG: 1 INJECTION, SOLUTION INTRAMUSCULAR; INTRAVENOUS; SUBCUTANEOUS at 10:02

## 2017-05-15 RX ADMIN — IPRATROPIUM BROMIDE AND ALBUTEROL SULFATE SCH: .5; 3 SOLUTION RESPIRATORY (INHALATION) at 15:19

## 2017-05-15 RX ADMIN — OXYCODONE HYDROCHLORIDE SCH MG: 10 TABLET, FILM COATED, EXTENDED RELEASE ORAL at 10:01

## 2017-05-15 RX ADMIN — FUROSEMIDE SCH MG: 20 TABLET ORAL at 09:02

## 2017-05-15 RX ADMIN — METOPROLOL TARTRATE SCH MG: 25 TABLET, FILM COATED ORAL at 13:36

## 2017-05-15 RX ADMIN — IPRATROPIUM BROMIDE AND ALBUTEROL SULFATE SCH: .5; 3 SOLUTION RESPIRATORY (INHALATION) at 18:54

## 2017-05-15 RX ADMIN — CEFAZOLIN SCH MLS/HR: 330 INJECTION, POWDER, FOR SOLUTION INTRAMUSCULAR; INTRAVENOUS at 17:19

## 2017-05-15 RX ADMIN — DILTIAZEM HYDROCHLORIDE SCH MG: 60 TABLET, FILM COATED ORAL at 09:35

## 2017-05-15 RX ADMIN — SUCRALFATE SCH GM: 1 TABLET ORAL at 20:16

## 2017-05-15 RX ADMIN — POTASSIUM CHLORIDE SCH MEQ: 20 TABLET, EXTENDED RELEASE ORAL at 09:02

## 2017-05-15 RX ADMIN — OXYCODONE HYDROCHLORIDE SCH MG: 10 TABLET, FILM COATED, EXTENDED RELEASE ORAL at 20:14

## 2017-05-15 RX ADMIN — DILTIAZEM HYDROCHLORIDE SCH MG: 60 TABLET, FILM COATED ORAL at 16:45

## 2017-05-15 RX ADMIN — POTASSIUM CHLORIDE SCH: 14.9 INJECTION, SOLUTION INTRAVENOUS at 05:34

## 2017-05-15 RX ADMIN — MONTELUKAST SODIUM SCH MG: 10 TABLET, FILM COATED ORAL at 20:13

## 2017-05-15 RX ADMIN — METOPROLOL TARTRATE SCH MG: 25 TABLET, FILM COATED ORAL at 09:01

## 2017-05-15 RX ADMIN — PANTOPRAZOLE SODIUM SCH MG: 40 TABLET, DELAYED RELEASE ORAL at 06:37

## 2017-05-15 RX ADMIN — HYDROMORPHONE HYDROCHLORIDE PRN MG: 1 INJECTION, SOLUTION INTRAMUSCULAR; INTRAVENOUS; SUBCUTANEOUS at 16:49

## 2017-05-15 RX ADMIN — IPRATROPIUM BROMIDE AND ALBUTEROL SULFATE SCH: .5; 3 SOLUTION RESPIRATORY (INHALATION) at 09:00

## 2017-05-15 RX ADMIN — POTASSIUM CHLORIDE SCH: 14.9 INJECTION, SOLUTION INTRAVENOUS at 05:36

## 2017-05-15 RX ADMIN — SUCRALFATE SCH GM: 1 TABLET ORAL at 09:02

## 2017-05-15 RX ADMIN — METOPROLOL TARTRATE SCH MG: 25 TABLET, FILM COATED ORAL at 20:13

## 2017-05-15 RX ADMIN — Medication SCH MG: at 09:03

## 2017-05-15 RX ADMIN — HEPARIN SODIUM SCH UNIT: 5000 INJECTION, SOLUTION INTRAVENOUS; SUBCUTANEOUS at 23:04

## 2017-05-15 RX ADMIN — THERA TABS SCH EACH: TAB at 09:03

## 2017-05-15 NOTE — XR
EXAMINATION TYPE: XR chest 1V

 

DATE OF EXAM: 5/15/2017 1:52 PM

 

COMPARISON: 5/11/2017

 

HISTORY: Pain

 

TECHNIQUE: Single frontal view of the chest is obtained.

 

FINDINGS:  There is a coarsened interstitial pattern suggestive of chronic interstitial lung disease.
 Cannot exclude a 1 cm left upper lobe pulmonary nodule. Heart enlarged and atherosclerotic change ao
rta. No pleural effusion or pneumothorax.

 

Arthropathy of the shoulders.

 

IMPRESSION:  

1. Correlate for chronic interstitial pulmonary fibrosis. Superimposed congestion or pneumonitis not 
excluded.

2. Density within the left upper lobe was not seen to represent nodule by chest CT and likely related
 to first rib.

## 2017-05-15 NOTE — PN
Mrs. Davis is a known case of ischemic heart disease and chronic 

atrial fibrillation who was admitted to the hospital with a fracture 

of the hip. Patient underwent surgery.  Patient is having less pain 

today. Patient's Cardizem dose has been cut back. Currently her heart 

rate is in the 80s. Patient's blood pressure 110/68. She denies any 

chest pain or shortness of breath. Lungs appear to be clear. Heart is 

irregular.  



Physical examination reveals a 71-year-old female who is alert, does 

not appear to be in any severe distress at this time.  

Blood pressure 110/68. Neck is supple. No JVD. Lungs appear to be 

clear.  

EXTREMITIES: No significant edema. 



Lab values showed a hemoglobin of 8.1, white count of 16,000. 

Electrolytes are within normal limits. Creatinine is 1.21.  



She is currently on amlodipine 10 mg daily, atorvastatin, Lasix 50 mg, 

hydralazine and Dilaudid.  



PLAN: Will discontinue IV Cardizem, will start her on p.o. Cardizem. 

The rest of the medication to be continued.

## 2017-05-15 NOTE — P.PN
Subjective


Principal diagnosis: 





Status post IT nail left hip





This is a 71-year-old female who is status post close reduction with insertion 

of intertrochanteric nail of the left hip.  She is doing well from an 

orthopedic standpoint.  She has no new complaints or concerns today.  She is 

quite sleepy today.





Objective





- Vital Signs


Vital signs: 


 Vital Signs











Temp  97.1 F L  05/15/17 11:05


 


Pulse  85   05/15/17 11:07


 


Resp  20   05/15/17 11:07


 


BP  119/79   05/15/17 11:05


 


Pulse Ox  94 L  05/15/17 11:05








 Intake & Output











 05/14/17 05/15/17 05/15/17





 18:59 06:59 18:59


 


Intake Total 220 360 221.583


 


Output Total 250 150 


 


Balance -30 210 221.583


 


Weight  71.5 kg 


 


Intake:   


 


  Intake, IV Titration  360 101.583





  Amount   


 


    Diltiazem 125 mg In  40 101.583





    Sodium Chloride 0.9% 100   





    ml @ 5 MG/HR 5 mls/hr IV   





    .Q24H [facility] Rx#:[id number]   


 


    Sodium Chloride 0.9% 1,  320 





    000 ml @ 75 mls/hr IV .   





    [id number] [facility] Rx#:[id number]   


 


  Oral 220  120


 


Output:   


 


  Urine 250 150 


 


Other:   


 


  Voiding Method Indwelling Catheter Bedpan Bedpan





  Incontinent Incontinent


 


  # Voids  1 


 


  # Bowel Movements 0  














- Exam





This is a 71-year-old female in no acute distress.  She is resting on her right 

side.  Exam of the left hip reveals that her incisions look good with no 

erythema or ecchymosis.  There is no drainage noted.  Pedal pulses +2/4.  

Neurovascular status to the LLE is intact.





- Labs


CBC & Chem 7: 


 05/15/17 05:41





 05/15/17 05:41


Labs: 


 Abnormal Lab Results - Last 24 Hours (Table)











  05/15/17 05/15/17 05/15/17 Range/Units





  05:41 05:41 09:15 


 


WBC  14.9 H    (3.8-10.6)  k/uL


 


RBC  2.95 L    (3.80-5.40)  m/uL


 


Hgb  7.6 L    (11.4-16.0)  gm/dL


 


Hct  25.0 L    (34.0-46.0)  %


 


MCHC  30.5 L    (31.0-37.0)  g/dL


 


RDW  19.6 H    (11.5-15.5)  %


 


BUN   45 H   (7-17)  mg/dL


 


Creatinine   1.40 H   (0.52-1.04)  mg/dL


 


Total Protein   4.9 L   (6.3-8.2)  g/dL


 


Albumin   2.1 L   (3.5-5.0)  g/dL


 


Urine Protein    Trace H  (Negative)  














Assessment and Plan


(1) Intertrochanteric fracture of left hip


Status: Acute   





(2) Rapid atrial fibrillation


Status: Acute   


Plan: 





The clinical findings are discussed the patient and her nurse.  Dressing may be 

changed today.  She does not necessarily need a new dressing if there is no 

drainage.  She is toe-touch weightbearing to the left lower extremity with 

walker. She may be discharged to rehab when cleared medically.

## 2017-05-15 NOTE — P.PN
Subjective








71-year-old  female one of Dr. Hood's patient who was rehospitalized 

this past week with significant hypoxia and shortness of breath consistent with 

chronic diastolic heart failure with her diuretics was hold from the week 

before because of GI bleed and hypotension patient had significant fluid 

retention and significant shortness of breath was hospitalized until the 10th 

when patient was more stable ended up being sent back to the nursing home 

rehab.  Patient earlier the week before was in the hospital with 

gastrointestinal bleed been on anticoagulation for her A. fib her 

anticoagulation was stopped completely at the time.


Patient [place] in [place] 

after she left the hospital she doesn't remember having any syncopal episode or 

any sign and symptom of TIA or CVA she tripped and fell on the right side ended 

up having severe pain and discomfort in the left hip area was not been able to 

ambulate and walk ended up coming to the emergency department her x-ray of the 

hip showed intertrochanteric fracture patient will be admitted to Dr. Chato hernandez for surgery from medical standpoint.  Hemoglobin has been much 

better since 2 days ago when she had the transfusion and hemoglobin corrected 

up to 9.5.  Patient also found to be in A. fib with pulse rate running around 

100 beats per minutes.  Line again from early in the week she had significant 

encephalopathy due to hypoxia has improved correcting her anemia and shortness 

of breath.


Patient might be evaluated by cardiology before going for either 

hemiarthroplasty or ORIF.





5/12: Patient is currently on Cardizem drip.  She has been resumed on her home 

dose of Lopressor.  Cardiology consult in place with recommendations to 

maintain the Cardizem drip during the perioperative period.  Norvasc was added 

for accelerated hypertension.  Echocardiogram reveals mild tricuspid 

regurgitation, mild concentric left ventricular hypertrophy L a severely 

dilated greater than 40, moderate aortic sclerosis, mild aortic regurgitation 

.Patient is scheduled for ORIF this afternoon.





05/13: Patient is laying down in bed she is complaining of increased pain in 

the left hip, she denies any chest pain, no shortness of breath, she is very 

weak and not participating in even transferring from bed to the bedside chair.





5/14: Patient is laying down in bed she is complaining of increased pain in the 

left hip, she denies any chest pain, shortness breath, she wanted to go back to 

[facility] today, however I expected to the patient that she is currently on a 

Cardizem drip, and she is not ready to go back to [facility] hopefully in the next 

1 or 2 days.





5/15: Patient is off Cardizem drip and switch to oral.  BUN is 45 creatinine 

1.40.  Hemoglobin 7.6.  No transfusion ordered.  Patient has been seen by Dr. Alston covering for Dr. Mcdonough regarding deep tissue injuries to the bilateral 

heels left worse than right, stage II decubitus ulcer to coccyx.  She is 

complaining of severe pain for which she has been resumed on her home 

medication of oxycodone 10 mg every 12 hours and Dilaudid 1 mg every 6 hours 

and hydrocodone 10 mg every 4 hours as needed.  Anticipate possible discharge 

to [facility] tomorrow.





Objective





- Vital Signs


Vital signs: 


 Vital Signs











Temp  97.1 F L  05/15/17 11:05


 


Pulse  85   05/15/17 11:07


 


Resp  20   05/15/17 11:07


 


BP  119/79   05/15/17 11:05


 


Pulse Ox  94 L  05/15/17 11:05








 Intake & Output











 05/14/17 05/15/17 05/15/17





 18:59 06:59 18:59


 


Intake Total 220 360 221.583


 


Output Total 250 150 


 


Balance -30 210 221.583


 


Weight  71.5 kg 


 


Intake:   


 


  Intake, IV Titration  360 101.583





  Amount   


 


    Diltiazem 125 mg In  40 101.583





    Sodium Chloride 0.9% 100   





    ml @ 5 MG/HR 5 mls/hr IV   





    .Q24H CORBIN Rx#:[id number]   


 


    Sodium Chloride 0.9% 1,  320 





    000 ml @ 75 mls/hr IV .   





    [id number] CORBIN Rx#:[id number]   


 


  Oral 220  120


 


Output:   


 


  Urine 250 150 


 


Other:   


 


  Voiding Method Indwelling Catheter Bedpan Bedpan





  Incontinent Incontinent


 


  # Voids  1 


 


  # Bowel Movements 0  














- Exam





General appearance: no average body habitus, cooperative, no disheveled, no 

mild distress, no morbidly obese, no acute distress, no obese, no severe 

distress, no thin





- EENT


Eyes: no abnormal pupil, no anicteric sclerae, no disc margins sharp, no 

edentulous, no EOMI, no PERRLA, no fundus normal, no photophobia, no dentition 

normal, no poor dentition, no ptosis, no scleral icterus, normal appearance


ENT: hard of hearing, no hearing grossly normal, no NA/AT, normal oropharynx, 

no other, no pharyngeal erythema, no thrush, no tonsillar exudates, no 

tonsillar swelling


Ears: bilateral: normal, bulging





- Neck


Neck: no lymphadenopathy, normal ROM, no other, no rigidity, no stridor, no 

thyromegaly


Carotids: bilateral: upstroke normal


Thyroid: bilateral: normal size





- Respiratory


Respiratory: bilateral: CTA, diminished





- Cardiovascular


Rhythm: irregularly irregular


Heart sounds: normal: S1, S2


Abnormal Heart Sounds: systolic murmur, no diastolic murmur, no rub, S3 Gallop, 

no S4 Gallop, no click, no other





- Gastrointestinal


General gastrointestinal: no absent bowel sounds, decreased bowel sounds, 

distended, no hepatomegaly, no hyperactive bowel sounds, no normal bowel sounds

, no organomegaly, no rigid, scaphoid, soft, no splenomegaly, no tenderness, no 

umbilical hernia, no ventral hernia





- Integumentary





Left hip and groin area quite bit painful mild deformity and left leg has an 

external rotation with short leg the left compared to the right.


Integumentary: no calor, no cellulitis, no cyanotic, no decreased turgor, no 

flushed, no jaundiced, normal, no normal turgor, pale, rash, unstageable 

decubitus ulcer to the bilateral heels left worse than right, stage II 

decubitus ulcer to coccyx





- Neurologic


Neurologic: CNII-XII intact





- Musculoskeletal


Musculoskeletal: gait normal, generalized weakness, strength equal bilaterally, 

no right sided weakness, no left sided weakness





- Psychiatric


Psychiatric: A&O x's 3, appropriate affect, no intact judgment & insight





- Labs


CBC & Chem 7: 


 05/15/17 05:41





 05/15/17 05:41


Labs: 


 Abnormal Lab Results - Last 24 Hours (Table)











  05/15/17 05/15/17 05/15/17 Range/Units





  05:41 05:41 09:15 


 


WBC  14.9 H    (3.8-10.6)  k/uL


 


RBC  2.95 L    (3.80-5.40)  m/uL


 


Hgb  7.6 L    (11.4-16.0)  gm/dL


 


Hct  25.0 L    (34.0-46.0)  %


 


MCHC  30.5 L    (31.0-37.0)  g/dL


 


RDW  19.6 H    (11.5-15.5)  %


 


BUN   45 H   (7-17)  mg/dL


 


Creatinine   1.40 H   (0.52-1.04)  mg/dL


 


Total Protein   4.9 L   (6.3-8.2)  g/dL


 


Albumin   2.1 L   (3.5-5.0)  g/dL


 


Urine Protein    Trace H  (Negative)  














Assessment and Plan


Plan: 





1 left hip fracture: Patient will be going for surgical intervention most 

likely ORIF and intertrochanteric nail of the left hip.





2 chronic diastolic heart failure.  Continue Lasix 60 mg twice daily.





3 Recent history of GI bleed: Has been off anticoagulation with hemoglobin is 

up to 9.3 with no active bleed currently.





4 CAD: Post PCI of the LAD has been on secondary prevention been on metoprolol, 

Lipitor, losartan and aspirin.





5 history of CVA: Affected the left frontal lobe brought by NASRA paulson with RVR her 

Eliquis has been on hold due to GI bleed





6 PAD: With mild carotid stenosis patient has been on secondary prevention no 

surgical intervention required.





7 hypertension with episode of accelerated hypertension for which Norvasc has 

been added: Remain on losartan 100 mg a day along with metoprolol 25 g three 

times a day and hydralazine 75 g twice a day.





8 hyperlipidemia: Continue Lipitor at 40 mg daily.





9 SHRUTHI. lorene with RVR with chronic atrial fibrillation: Continue Cardizem and 

metoprolol





10 COPD/asthma: Remain on DuoNeb along with Singulair and O2 as needed.





11 anemia: With acute blood loss secondary to GI bleed posttransfusion few days 

ago has hemoglobin up to 9.3 continue secondary prevention with PPI on 

omeprazole 40 mg daily.





12 DVT prophylaxis: Patient will be on heparin subcutaneous post surgery.





13 GI prophylaxis: Patient will continue on omeprazole.





14 deep tissue injuries to the bilateral heels and stage II decubitus ulcer to 

the coccyx.  Consult with Dr. Alston.  Continue local care.





CODE STATUS: Full code.





Discharge plan: Return to [facility]





Impression and plan of care have been directed as dictated by the signing 

physician.  Danielle Herbert nurse practitioner acting as scribe for signing 

physician.

## 2017-05-15 NOTE — P.PN
Subjective


Principal diagnosis: 





Atrial fibrillation





This is a pleasant 71-year-old  female with known history of coronary 

artery disease and prior LAD stenting, hypertension, hyperlipidemia, chronic 

renal failure,prior CVA, PAD with prior carotid endarterectomy, chronic 

persistent atrial fibrillation, who was in the hospital recently with GI bleed 

and hypotension.  Patient was discharged home from the hospital and then 

readmitted this past week with significant hypoxia and associated shortness of 

breath consistent with diastolic congestive heart failure.  Patient again was 

discharged back to the nursing home for rehab, and apparently had a fall in the 

bathroom.  For this reason she was again readmitted to the hospital.  Straight 

performed on admission here revealed an acute mildly displaced anterior 

trochanter proximal diaphyseal fracture of the left femur.  Patient underwent 

left hip intertrochanteric fracture closed reduction and intramedullary 

nailing.  Continues to be in atrial fibrillation with a heart rate in the 80s 

today.  We will discontinue the IV Cardizem drip and change the patient over to 

oral Cardizem.  Chest x-ray of today reveals correlation for chronic 

interstitial pulmonary fibrosis, superimposed congestion or pneumonia not 

excluded.





Objective





- Vital Signs


Vital signs: 


 Vital Signs











Temp  97.1 F L  05/15/17 11:05


 


Pulse  85   05/15/17 11:07


 


Resp  20   05/15/17 11:07


 


BP  119/79   05/15/17 11:05


 


Pulse Ox  94 L  05/15/17 11:05








 Intake & Output











 05/14/17 05/15/17 05/15/17





 18:59 06:59 18:59


 


Intake Total .583


 


Output Total 250 150 175


 


Balance -30 210 946.583


 


Weight  71.5 kg 71.5 kg


 


Intake:   


 


  Intake, IV Titration  360 1001.583





  Amount   


 


    Diltiazem 125 mg In  40 101.583





    Sodium Chloride 0.9% 100   





    ml @ 5 MG/HR 5 mls/hr IV   





    .Q24H [facility]BIN Rx#:[id number]   


 


    Sodium Chloride 0.9% 1,  320 900





    000 ml @ 75 mls/hr IV .   





    [id number] CORBIN Rx#:[id number]   


 


  Oral 220  120


 


Output:   


 


  Urine 250 150 175


 


Other:   


 


  Voiding Method Indwelling Catheter Bedpan Bedpan





  Incontinent Incontinent


 


  # Voids  1 1


 


  # Bowel Movements 0  1














- Exam





PHYSICAL EXAMINATION: 





HEENT: Head is atraumatic, normocephalic.  Pupils equal, round.  Neck is 

supple.  There is  elevated jugular venous pressure.  Right carotid bruit.





HEART EXAMINATION: S1 and S2 irregularly irregular





CHEST EXAMINATION: Lungs reveal decreased air exchange bilaterally with 

scattered rhonchi that clear with cough.  Fine Rales heard.





ABDOMEN:  Soft, nontender. Bowel sounds are heard. No organomegaly noted.


 


EXTREMITIES: 2+ peripheral pulses with  evidence of peripheral edema and no 

calf tenderness noted.





NEUROLOGIC patient is sleepy , alert and oriented -3.


 





- Labs


CBC & Chem 7: 


 05/15/17 05:41





 05/15/17 05:41


Labs: 


 Abnormal Lab Results - Last 24 Hours (Table)











  05/15/17 05/15/17 05/15/17 Range/Units





  05:41 05:41 09:15 


 


WBC  14.9 H    (3.8-10.6)  k/uL


 


RBC  2.95 L    (3.80-5.40)  m/uL


 


Hgb  7.6 L    (11.4-16.0)  gm/dL


 


Hct  25.0 L    (34.0-46.0)  %


 


MCHC  30.5 L    (31.0-37.0)  g/dL


 


RDW  19.6 H    (11.5-15.5)  %


 


BUN   45 H   (7-17)  mg/dL


 


Creatinine   1.40 H   (0.52-1.04)  mg/dL


 


Total Protein   4.9 L   (6.3-8.2)  g/dL


 


Albumin   2.1 L   (3.5-5.0)  g/dL


 


Urine Protein    Trace H  (Negative)  














Assessment and Plan


Plan: 


Assessment and plan


#1 left hip fracture, status post ORIF


#2 recent admission with congestive cardiac failure, diastolic in nature.  Most 

recent echo was performed in March which revealed an ejection fraction of 45-50

% with inferior hypokinesis.


#3 recent history of GI bleed, taken off anticoagulation for this reason.


#4 chronic persistent atrial fibrillation, currently on a Cardizem drip.


#5 known history of coronary artery disease with prior stenting of the LAD


#6 accelerated hypertension


#7 history of hypertension


#8 history of prior CVA


#9 hyperlipidemia


#10 COPD


#11 anemia





Plan


From cardiology's perspective, we'll discontinue the patient's IV Cardizem, 

start her on Cardizem 60 by mouth 3 times a day.  We also recommend to hold the 

patient's Lasix today as her creatinine is gone up to 1.4 from 0.86.  Check 

lytes BUN and creatinine in the morning.





DNP note has been reviewed, I agree with a documented findings and plan of 

care.  Patient was seen and examined.

## 2017-05-16 VITALS — RESPIRATION RATE: 16 BRPM

## 2017-05-16 LAB
ALP SERPL-CCNC: 85 U/L (ref 38–126)
ALT SERPL-CCNC: 24 U/L (ref 9–52)
ANION GAP SERPL CALC-SCNC: 6 MMOL/L
AST SERPL-CCNC: 27 U/L (ref 14–36)
BUN SERPL-SCNC: 44 MG/DL (ref 7–17)
CALCIUM SPEC-MCNC: 8.1 MG/DL (ref 8.4–10.2)
CH: 24.8
CHCM: 30
CHLORIDE SERPL-SCNC: 101 MMOL/L (ref 98–107)
CO2 SERPL-SCNC: 30 MMOL/L (ref 22–30)
ERYTHROCYTE [DISTWIDTH] IN BLOOD BY AUTOMATED COUNT: 3.1 M/UL (ref 3.8–5.4)
ERYTHROCYTE [DISTWIDTH] IN BLOOD: 19.5 % (ref 11.5–15.5)
GLUCOSE SERPL-MCNC: 86 MG/DL (ref 74–99)
HCT VFR BLD AUTO: 25.6 % (ref 34–46)
HDW: 3.5
HGB BLD-MCNC: 7.7 GM/DL (ref 11.4–16)
MCH RBC QN AUTO: 24.9 PG (ref 25–35)
MCHC RBC AUTO-ENTMCNC: 30.1 G/DL (ref 31–37)
MCV RBC AUTO: 82.7 FL (ref 80–100)
NON-AFRICAN AMERICAN GFR(MDRD): 52
POTASSIUM SERPL-SCNC: 4.2 MMOL/L (ref 3.5–5.1)
PROT SERPL-MCNC: 5.1 G/DL (ref 6.3–8.2)
SODIUM SERPL-SCNC: 137 MMOL/L (ref 137–145)
WBC # BLD AUTO: 16 K/UL (ref 3.8–10.6)

## 2017-05-16 RX ADMIN — HEPARIN SODIUM SCH UNIT: 5000 INJECTION, SOLUTION INTRAVENOUS; SUBCUTANEOUS at 08:55

## 2017-05-16 RX ADMIN — SUCRALFATE SCH GM: 1 TABLET ORAL at 21:30

## 2017-05-16 RX ADMIN — MONTELUKAST SODIUM SCH MG: 10 TABLET, FILM COATED ORAL at 21:30

## 2017-05-16 RX ADMIN — DILTIAZEM HYDROCHLORIDE SCH MG: 60 TABLET, FILM COATED ORAL at 08:56

## 2017-05-16 RX ADMIN — OXYCODONE HYDROCHLORIDE SCH MG: 10 TABLET, FILM COATED, EXTENDED RELEASE ORAL at 08:54

## 2017-05-16 RX ADMIN — THERA TABS SCH EACH: TAB at 08:55

## 2017-05-16 RX ADMIN — DOCUSATE SODIUM AND SENNOSIDES SCH EACH: 50; 8.6 TABLET ORAL at 21:30

## 2017-05-16 RX ADMIN — HYDROMORPHONE HYDROCHLORIDE PRN MG: 1 INJECTION, SOLUTION INTRAMUSCULAR; INTRAVENOUS; SUBCUTANEOUS at 19:47

## 2017-05-16 RX ADMIN — Medication SCH MG: at 08:55

## 2017-05-16 RX ADMIN — IPRATROPIUM BROMIDE AND ALBUTEROL SULFATE SCH: .5; 3 SOLUTION RESPIRATORY (INHALATION) at 08:11

## 2017-05-16 RX ADMIN — PANTOPRAZOLE SODIUM SCH MG: 40 TABLET, DELAYED RELEASE ORAL at 06:12

## 2017-05-16 RX ADMIN — METOPROLOL TARTRATE SCH MG: 25 TABLET, FILM COATED ORAL at 21:31

## 2017-05-16 RX ADMIN — OXYCODONE HYDROCHLORIDE SCH MG: 10 TABLET, FILM COATED, EXTENDED RELEASE ORAL at 21:30

## 2017-05-16 RX ADMIN — LEVOFLOXACIN SCH MLS/HR: 5 INJECTION, SOLUTION INTRAVENOUS at 11:00

## 2017-05-16 RX ADMIN — IPRATROPIUM BROMIDE AND ALBUTEROL SULFATE SCH: .5; 3 SOLUTION RESPIRATORY (INHALATION) at 11:09

## 2017-05-16 RX ADMIN — METOPROLOL TARTRATE SCH MG: 25 TABLET, FILM COATED ORAL at 06:12

## 2017-05-16 RX ADMIN — Medication SCH MG: at 21:30

## 2017-05-16 RX ADMIN — CEFAZOLIN SCH MLS/HR: 330 INJECTION, POWDER, FOR SOLUTION INTRAMUSCULAR; INTRAVENOUS at 06:13

## 2017-05-16 RX ADMIN — HYDROCODONE BITARTRATE AND ACETAMINOPHEN PRN EACH: 7.5; 325 TABLET ORAL at 21:31

## 2017-05-16 RX ADMIN — HYDROCODONE BITARTRATE AND ACETAMINOPHEN PRN EACH: 7.5; 325 TABLET ORAL at 15:01

## 2017-05-16 RX ADMIN — POTASSIUM CHLORIDE SCH MEQ: 20 TABLET, EXTENDED RELEASE ORAL at 08:55

## 2017-05-16 RX ADMIN — SUCRALFATE SCH GM: 1 TABLET ORAL at 08:55

## 2017-05-16 RX ADMIN — HYDROMORPHONE HYDROCHLORIDE PRN MG: 1 INJECTION, SOLUTION INTRAMUSCULAR; INTRAVENOUS; SUBCUTANEOUS at 05:55

## 2017-05-16 RX ADMIN — IPRATROPIUM BROMIDE AND ALBUTEROL SULFATE SCH: .5; 3 SOLUTION RESPIRATORY (INHALATION) at 15:35

## 2017-05-16 RX ADMIN — DILTIAZEM HYDROCHLORIDE SCH MG: 60 TABLET, FILM COATED ORAL at 21:31

## 2017-05-16 RX ADMIN — HYDROMORPHONE HYDROCHLORIDE PRN MG: 1 INJECTION, SOLUTION INTRAMUSCULAR; INTRAVENOUS; SUBCUTANEOUS at 12:13

## 2017-05-16 RX ADMIN — ATORVASTATIN CALCIUM SCH MG: 40 TABLET, FILM COATED ORAL at 21:30

## 2017-05-16 RX ADMIN — HEPARIN SODIUM SCH UNIT: 5000 INJECTION, SOLUTION INTRAVENOUS; SUBCUTANEOUS at 15:17

## 2017-05-16 RX ADMIN — LOSARTAN POTASSIUM SCH MG: 50 TABLET, FILM COATED ORAL at 08:56

## 2017-05-16 RX ADMIN — METOPROLOL TARTRATE SCH MG: 25 TABLET, FILM COATED ORAL at 14:46

## 2017-05-16 RX ADMIN — HEPARIN SODIUM SCH UNIT: 5000 INJECTION, SOLUTION INTRAVENOUS; SUBCUTANEOUS at 23:22

## 2017-05-16 RX ADMIN — IPRATROPIUM BROMIDE AND ALBUTEROL SULFATE SCH: .5; 3 SOLUTION RESPIRATORY (INHALATION) at 19:37

## 2017-05-16 RX ADMIN — CEFAZOLIN SCH MLS/HR: 330 INJECTION, POWDER, FOR SOLUTION INTRAMUSCULAR; INTRAVENOUS at 15:17

## 2017-05-16 RX ADMIN — DILTIAZEM HYDROCHLORIDE SCH MG: 60 TABLET, FILM COATED ORAL at 15:17

## 2017-05-16 NOTE — P.PN
Subjective


Principal diagnosis: 





Status post IT nail left hip





This is a 71 year-old female post left hip IT nail.  This is post-op day 4.  

The patient was evaluated at the bedside today.  The patient denies nausea, 

vomiting, abdominal pain, shortness of breath, and chest pain this morning.  

She states her pain is not controlled at this time.  The patient has been up 

with physical therapy. We are awaiting rehab placement once cleared medically. 





Objective





- Vital Signs


Vital signs: 


 Vital Signs











Temp  99.5 F   05/16/17 04:00


 


Pulse  88   05/16/17 04:00


 


Resp  20   05/16/17 04:00


 


BP  127/59   05/16/17 04:00


 


Pulse Ox  92 L  05/16/17 08:11








 Intake & Output











 05/15/17 05/16/17 05/16/17





 18:59 06:59 18:59


 


Intake Total 1121.583 600 180


 


Output Total 675 4 


 


Balance 446.583 596 180


 


Weight 71.5 kg 72 kg 


 


Intake:   


 


  Intake, IV Titration 1001.583 600 





  Amount   


 


    Diltiazem 125 mg In 101.583  





    Sodium Chloride 0.9% 100   





    ml @ 5 MG/HR 5 mls/hr IV   





    .Q24H [facility]BIN Rx#:[id number]   


 


    Sodium Chloride 0.9% 1, 900 600 





    000 ml @ 75 mls/hr IV .   





    [id number] CORBIN Rx#:[id number]   


 


  Oral 120  180


 


Output:   


 


  Urine 675  


 


  Urine/Stool Mix  4 


 


Other:   


 


  Voiding Method Bedpan Bedpan 





 Incontinent Incontinent 


 


  # Voids 1 1 


 


  # Bowel Movements 1  














- Exam





The patient does not appear in acute distress.  Alert and orientated x3.  No 

dressing is in place.  Incision appears fine with no erythema or active 

drainage.  Calf is soft and nontender.  Good foot and ankle motion without 

difficulty.  Sensation and circulatory status is intact.





- Labs


CBC & Chem 7: 


 05/16/17 06:26





 05/16/17 06:26


Labs: 


 Abnormal Lab Results - Last 24 Hours (Table)











  05/15/17 05/16/17 05/16/17 Range/Units





  09:15 06:26 06:26 


 


WBC   16.0 H   (3.8-10.6)  k/uL


 


RBC   3.10 L   (3.80-5.40)  m/uL


 


Hgb   7.7 L   (11.4-16.0)  gm/dL


 


Hct   25.6 L   (34.0-46.0)  %


 


MCH   24.9 L   (25.0-35.0)  pg


 


MCHC   30.1 L   (31.0-37.0)  g/dL


 


RDW   19.5 H   (11.5-15.5)  %


 


Plt Count   451 H   (150-450)  k/uL


 


BUN    44 H  (7-17)  mg/dL


 


Creatinine    1.05 H  (0.52-1.04)  mg/dL


 


Calcium    8.1 L  (8.4-10.2)  mg/dL


 


Total Protein    5.1 L  (6.3-8.2)  g/dL


 


Albumin    2.2 L  (3.5-5.0)  g/dL


 


Urine Protein  Trace H    (Negative)  








 Microbiology - Last 24 Hours (Table)











 05/15/17 09:15 Urine Culture - Preliminary





 Urine,Catheterized 














Assessment and Plan


(1) Fall


Status: Acute   





(2) Intertrochanteric fracture of left hip


Status: Acute   


Plan: 





1.  Continue pain control, increase to Norco 7.5


2.  Anticoagulation per medical management


3.  Continue physical therapy and ambulation, toe-touch weightbearing 


4.  Anticipate discharge to rehab when medically stable

## 2017-05-16 NOTE — PN
DATE OF SERVICE: 05/16/2017



REASON FOR FOLLOWUP: Bilateral heel pressure ulcers and sacral 

pressure ulcer.  



INTERVAL HISTORY: The patient is afebrile. She is feeling better. 

Overall pain is currently controlled. Patient denies significant chest 

pain or cough. No abdominal pain and no diarrhea. On examination, her 

blood pressure is 124/58 with a pulse of 75, temperature 96.4. She is 

98% on 2 L nasal cannula. General description is an elderly female 

lying in bed in no distress.  

RESPIRATORY SYSTEM: Unlabored breathing. Clear to auscultation 

anteriorly.  

HEART: S1, S2. Regular rate and rhythm. 

ABDOMEN: Soft. No tenderness. 

Bilateral heels currently with heel protectors on. No significant 

swelling or redness or any drainage.  



LABS: Hemoglobin is 7.7, white count up to 16,000. BUN of 44, 

creatinine 1.05. UA has been negative.  



DIAGNOSTIC IMPRESSION AND PLAN: 

1. Patient with bilateral heel area deep tissue injury with open ulcer 

at this point. Would recommend local wound care and keeping the area 

off pressure and dry. Use of heel protectors all the time.  

2. Sacral wound with no evidence of significant cellulitis. Local 

wound care has been recommended (      )

## 2017-05-16 NOTE — CONS
DATE OF CONSULTATION:  05/15/2017  



REASON FOR CONSULTATION: Bilateral heel and sacral wound. 



HISTORY OF PRESENT ILLNESS: Patient is a 71-year-old  female 

brought into the ER at [facility] on 5/11/2017 after 

apparently the patient did lose her balance while in the bathroom and 

did fall on her left hip. The patient did have significant pain in her 

left hip area. The patient subsequently was brought into the ER where 

the patient did have x-rays of the left hip with evidence of acute 

bilaterally displaced intertrochanteric fracture of the left 

femur. Patient was evaluated and admitted to the hospital. [facility] the patient and the patient did have a left 

hip intertrochanteric fracture closed reduction and intramedullary 

nailing using Synthes IT nail on 5/12/2017 by Dr. Reis. Since then 

the patient has been here in the hospital. The patient was noticed to 

have a deep tissue injury to bilateral heel area and sacral  area for 

which ID was consulted today for further recommendation regarding 

local wound care. The patient's pain to the left hip is currently 

controlled. Patient denies significant pain to the sacral area. pt 

with out evidence of any incontinence of urine or stool. Apparently the 

patient has been mostly in bed bound and not moving around. Today was 

the first time the patient was put in the chair. No fever has been 

recorded during this hospital stay. There is slightly evaluated white 

count though is trending down.   



REVIEW OF SYSTEMS: 

CONSTITUTIONAL: Positive for weakness. Denies any fever. 

EYES: No complaint.  

ENT: No complaint. 

RESPIRATORY: Mild shortness of breath. 

CARDIOVASCULAR: No complaint. 

GENITOURINARY: No complaint. 

GASTROINTESTINAL: Constipation. 

MUSCULOSKELETAL: As per HPI. 

INTEGUMENTARY: As per HPI. 

PSYCHOLOGICAL: No complaint. 

ENDOCRINE: No complaint. 

NEUROLOGIC: No complaint. 



Past medical history significant for atrial fibrillation, coronary 

artery disease, asthma, heart failure, COPD, CVA, TIA, 

gastroesophageal reflux disease, GI bleed, hyperlipidemia, 

hypertension, myocardial infarction, mitral valve prolase, 

musculoskeletal disorder, osteoarthritis.  



PAST SURGICAL HISTORY: Adenoidectomy, appendectomy, bladder surgery, 

cholecystectomy, heart catheterization with stent, hysterectomy, 

tonsillectomy, tubal ligation. 



SOCIAL HISTORY: Remote history of smoking. No drinking or drug use. 



FAMILY HISTORY: Mother with history of congestive heart failure. 

Father with history of bone cancer.  



ALLERGIES: MORPHINE, LIDOCAINE, SULFA ANTIBIOTICS. 



Medications currently include the patient is on Norco, DuoNeb, 

Lipitor, Valium, Cardizem, vitamin D2, iron sulfate, heparin, 

hydralazine, Dilaudid, Restoril, Cozaar, milk of magnesia, melatonin, 

Lopressor, Singulair, Theragran, Narcan, Nitrostat, Zofran, OxyContin, 

Senokot and Carafate.  



On examination, blood pressure is 106/49 with a pulse of 90, 

temperature 97.8. She is 92% on room air. General description is an 

elderly female lying in bed in no distress. No tachypnea or accessory 

muscle of respiration use. HEENT examination shows slight pallor. No 

scleral icterus. Oral mucous membranes dry.  

NECK: Trachea central. There is no thyromegaly. 

LUNGS: Unlabored breathing. Clear to auscultation anteriorly. No 

wheeze or crackle.  

HEART: S1, S2. Irregular rhythm. 

ABDOMEN: Soft, no tenderness.

EXTREMITIES: No edema of feet. 

SKIN: Examination of the bilateral heel area did show a deep tissue 

injury with no skin breakdown. No surrounding swelling, redness or 

drainage. Sacral area with very small stage II pressure ulcer with no 

evidence of any cellulitis. The left hip incision site looks clean.  

NEUROLOGICAL: The patient is awake, alert, oriented x3. Mood and 

affect normal.  



LABS: Hemoglobin is 7.3, white count of 14.9 with a BUN of 45, 

creatinine 1.4. UA has been negative.  



DIAGNOSTIC IMPRESSION AND PLAN: 

1. Patient with deep tissue injury to bilateral heel area with no open 

area. No evidence of any cellulitis. Recommend local wound care with 

heel protectors to keep the area off the pressure. No need for any 

specific creams or lotion.  

2. Stage II sacral area no cellulitis . Would recommend keep protecting the 

area and keep the area off the pressure with frequent change position. 

 



Thank you for this consultation. We will follow this patient along 

with you. 



KARLIE

## 2017-05-16 NOTE — P.PN
Subjective








71-year-old  female one of Dr. Hood's patient who was rehospitalized 

this past week with significant hypoxia and shortness of breath consistent with 

chronic diastolic heart failure with her diuretics was hold from the week 

before because of GI bleed and hypotension patient had significant fluid 

retention and significant shortness of breath was hospitalized until the 10th 

when patient was more stable ended up being sent back to the nursing home 

rehab.  Patient earlier the week before was in the hospital with 

gastrointestinal bleed been on anticoagulation for her A. fib her 

anticoagulation was stopped completely at the time.


[place] 

after she left the hospital she doesn't remember having any syncopal episode or 

any sign and symptom of TIA or CVA she tripped and fell on the right side ended 

up having severe pain and discomfort in the left hip area was not been able to 

ambulate and walk ended up coming to the emergency department her x-ray of the 

hip showed intertrochanteric fracture patient will be admitted to Dr. Chato hernandez for surgery from medical standpoint.  Hemoglobin has been much 

better since 2 days ago when she had the transfusion and hemoglobin corrected 

up to 9.5.  Patient also found to be in A. fib with pulse rate running around 

100 beats per minutes.  Line again from early in the week she had significant 

encephalopathy due to hypoxia has improved correcting her anemia and shortness 

of breath.


Patient might be evaluated by cardiology before going for either 

hemiarthroplasty or ORIF.





5/12: Patient is currently on Cardizem drip.  She has been resumed on her home 

dose of Lopressor.  Cardiology consult in place with recommendations to 

maintain the Cardizem drip during the perioperative period.  Norvasc was added 

for accelerated hypertension.  Echocardiogram reveals mild tricuspid 

regurgitation, mild concentric left ventricular hypertrophy L a severely 

dilated greater than 40, moderate aortic sclerosis, mild aortic regurgitation 

.Patient is scheduled for ORIF this afternoon.





05/13: Patient is laying down in bed she is complaining of increased pain in 

the left hip, she denies any chest pain, no shortness of breath, she is very 

weak and not participating in even transferring from bed to the bedside chair.





5/14: Patient is laying down in bed she is complaining of increased pain in the 

left hip, she denies any chest pain, shortness breath, she wanted to go back to 

[facility] today, however I expected to the patient that she is currently on a 

Cardizem drip, and she is not ready to go back to [facility] hopefully in the next 

1 or 2 days.





5/15: Patient is off Cardizem drip and switch to oral.  BUN is 45 creatinine 

1.40.  Hemoglobin 7.6.  No transfusion ordered.  Patient has been seen by Dr. Alston covering for Dr. Mcdonough regarding deep tissue injuries to the bilateral 

heels left worse than right, stage II decubitus ulcer to coccyx.  She is 

complaining of severe pain for which she has been resumed on her home 

medication of oxycodone 10 mg every 12 hours and Dilaudid 1 mg every 6 hours 

and hydrocodone 10 mg every 4 hours as needed.  Anticipate possible discharge 

to [facility] tomorrow.





5/16: Hemoglobin is 7.7 and one unit of packed RBCs ordered.  Repeat chest x-

ray showed chronic interstitial pulmonary fibrosis.  Superimposed congestion or 

pneumonitis not excluded.  Patient started on Levaquin and continued on 

nebulizer treatments.  Anticipate possible discharge to [facility] tomorrow.





Objective





- Vital Signs


Vital signs: 


 Vital Signs











Temp  96.6 F L  05/16/17 11:04


 


Pulse  72   05/16/17 11:05


 


Resp  18   05/16/17 11:05


 


BP  124/61   05/16/17 11:04


 


Pulse Ox  96   05/16/17 11:04








 Intake & Output











 05/15/17 05/16/17 05/16/17





 18:59 06:59 18:59


 


Intake Total 1121.583 600 400


 


Output Total 675 4 


 


Balance 446.583 596 400


 


Weight 71.5 kg 72 kg 


 


Intake:   


 


  Intake, IV Titration 1001.583 600 





  Amount   


 


    Diltiazem 125 mg In 101.583  





    Sodium Chloride 0.9% 100   





    ml @ 5 MG/HR 5 mls/hr IV   





    .Q24H CORBIN Rx#:[id number]   


 


    Sodium Chloride 0.9% 1, 900 600 





    000 ml @ 75 mls/hr IV .   





    [id number] CORBIN Rx#:[id number]   


 


  Oral 120  400


 


Output:   


 


  Urine 675  


 


  Urine/Stool Mix  4 


 


Other:   


 


  Voiding Method Bedpan Bedpan Bedpan





 Incontinent Incontinent Incontinent


 


  # Voids 1 1 


 


  # Bowel Movements 1  














- Exam





General appearance: no average body habitus, cooperative, no disheveled, no 

mild distress, no morbidly obese, no acute distress, no obese, no severe 

distress, no thin





- EENT


Eyes: no abnormal pupil, no anicteric sclerae, no disc margins sharp, no 

edentulous, no EOMI, no PERRLA, no fundus normal, no photophobia, no dentition 

normal, no poor dentition, no ptosis, no scleral icterus, normal appearance


ENT: hard of hearing, no hearing grossly normal, no NA/AT, normal oropharynx, 

no other, no pharyngeal erythema, no thrush, no tonsillar exudates, no 

tonsillar swelling


Ears: bilateral: normal, bulging





- Neck


Neck: no lymphadenopathy, normal ROM, no other, no rigidity, no stridor, no 

thyromegaly


Carotids: bilateral: upstroke normal


Thyroid: bilateral: normal size





- Respiratory


Respiratory: bilateral: CTA, diminished





- Cardiovascular


Rhythm: irregularly irregular


Heart sounds: normal: S1, S2


Abnormal Heart Sounds: systolic murmur, no diastolic murmur, no rub, S3 Gallop, 

no S4 Gallop, no click, no other





- Gastrointestinal


General gastrointestinal: no absent bowel sounds, decreased bowel sounds, 

distended, no hepatomegaly, no hyperactive bowel sounds, no normal bowel sounds

, no organomegaly, no rigid, scaphoid, soft, no splenomegaly, no tenderness, no 

umbilical hernia, no ventral hernia





- Integumentary





Left hip and groin area quite bit painful mild deformity and left leg has an 

external rotation with short leg the left compared to the right.


Integumentary: no calor, no cellulitis, no cyanotic, no decreased turgor, no 

flushed, no jaundiced, normal, no normal turgor, pale, rash, unstageable 

decubitus ulcer to the bilateral heels left worse than right, stage II 

decubitus ulcer to coccyx





- Neurologic


Neurologic: CNII-XII intact





- Musculoskeletal


Musculoskeletal: gait normal, generalized weakness, strength equal bilaterally, 

no right sided weakness, no left sided weakness





- Psychiatric


Psychiatric: A&O x's 3, appropriate affect, no intact judgment & insight





- Labs


CBC & Chem 7: 


 05/16/17 06:26





 05/16/17 06:26


Labs: 


 Abnormal Lab Results - Last 24 Hours (Table)











  05/16/17 05/16/17 05/16/17 Range/Units





  06:26 06:26 10:18 


 


WBC  16.0 H    (3.8-10.6)  k/uL


 


RBC  3.10 L    (3.80-5.40)  m/uL


 


Hgb  7.7 L    (11.4-16.0)  gm/dL


 


Hct  25.6 L    (34.0-46.0)  %


 


MCH  24.9 L    (25.0-35.0)  pg


 


MCHC  30.1 L    (31.0-37.0)  g/dL


 


RDW  19.5 H    (11.5-15.5)  %


 


Plt Count  451 H    (150-450)  k/uL


 


BUN   44 H   (7-17)  mg/dL


 


Creatinine   1.05 H   (0.52-1.04)  mg/dL


 


Calcium   8.1 L   (8.4-10.2)  mg/dL


 


Total Protein   5.1 L   (6.3-8.2)  g/dL


 


Albumin   2.2 L   (3.5-5.0)  g/dL


 


Crossmatch    See Detail  








 Microbiology - Last 24 Hours (Table)











 05/15/17 09:15 Urine Culture - Final





 Urine,Catheterized 


 


 05/15/17 07:15 Blood Culture - Preliminary





 Blood    No Growth after 24 hours














Assessment and Plan


Plan: 





1 left hip fracture: Patient will be going for surgical intervention most 

likely ORIF and intertrochanteric nail of the left hip.





2 chronic diastolic heart failure.  Continue Lasix 60 mg twice daily.





3 Recent history of GI bleed: Has been off anticoagulation with hemoglobin is 

up to 9.3 with no active bleed currently.





4 CAD: Post PCI of the LAD has been on secondary prevention been on metoprolol, 

Lipitor, losartan and aspirin.





5 history of CVA: Affected the left frontal lobe brought by NASRA paulson with RVR her 

Eliquis has been on hold due to GI bleed





6 PAD: With mild carotid stenosis patient has been on secondary prevention no 

surgical intervention required.





7 hypertension with episode of accelerated hypertension for which Norvasc has 

been added: Remain on losartan 100 mg a day along with metoprolol 25 g three 

times a day and hydralazine 75 g twice a day.





8 hyperlipidemia: Continue Lipitor at 40 mg daily.





9 NASRA paulson with RVR with chronic atrial fibrillation: Continue Cardizem and 

metoprolol





10 COPD/asthma: Remain on DuoNeb along with Singulair and O2 as needed.





11 anemia: With acute blood loss secondary to GI bleed posttransfusion few days 

ago has hemoglobin up to 9.3 continue secondary prevention with PPI on 

omeprazole 40 mg daily.





12 DVT prophylaxis: Patient will be on heparin subcutaneous post surgery.





13 GI prophylaxis: Patient will continue on omeprazole.





14 deep tissue injuries to the bilateral heels (hospital acquired) and stage II 

decubitus ulcer to the coccyx (present on admission.  Consult with Dr. Alston.  

Continue local care.





15 possible pneumonia for which Levaquin started.  Continue DuoNeb treatments.





CODE STATUS: Full code.





Discharge plan: Return to [facility]





Impression and plan of care have been directed as dictated by the signing 

physician.  Danielle Herbert nurse practitioner acting as scribe for signing 

physician.

## 2017-05-17 VITALS — TEMPERATURE: 98.4 F | DIASTOLIC BLOOD PRESSURE: 54 MMHG | SYSTOLIC BLOOD PRESSURE: 118 MMHG

## 2017-05-17 VITALS — HEART RATE: 88 BPM

## 2017-05-17 LAB
ANION GAP SERPL CALC-SCNC: 8 MMOL/L
BUN SERPL-SCNC: 44 MG/DL (ref 7–17)
CALCIUM SPEC-MCNC: 8.3 MG/DL (ref 8.4–10.2)
CH: 25.6
CHCM: 30.4
CHLORIDE SERPL-SCNC: 103 MMOL/L (ref 98–107)
CO2 SERPL-SCNC: 26 MMOL/L (ref 22–30)
ERYTHROCYTE [DISTWIDTH] IN BLOOD BY AUTOMATED COUNT: 3.25 M/UL (ref 3.8–5.4)
ERYTHROCYTE [DISTWIDTH] IN BLOOD: 18.9 % (ref 11.5–15.5)
GLUCOSE SERPL-MCNC: 90 MG/DL (ref 74–99)
HCT VFR BLD AUTO: 27.4 % (ref 34–46)
HDW: 4.28
HGB BLD-MCNC: 8.3 GM/DL (ref 11.4–16)
MCH RBC QN AUTO: 25.5 PG (ref 25–35)
MCHC RBC AUTO-ENTMCNC: 30.2 G/DL (ref 31–37)
MCV RBC AUTO: 84.5 FL (ref 80–100)
NON-AFRICAN AMERICAN GFR(MDRD): 53
POTASSIUM SERPL-SCNC: 4.1 MMOL/L (ref 3.5–5.1)
SODIUM SERPL-SCNC: 137 MMOL/L (ref 137–145)
WBC # BLD AUTO: 14.2 K/UL (ref 3.8–10.6)

## 2017-05-17 RX ADMIN — IPRATROPIUM BROMIDE AND ALBUTEROL SULFATE SCH: .5; 3 SOLUTION RESPIRATORY (INHALATION) at 08:37

## 2017-05-17 RX ADMIN — IPRATROPIUM BROMIDE AND ALBUTEROL SULFATE SCH: .5; 3 SOLUTION RESPIRATORY (INHALATION) at 08:36

## 2017-05-17 RX ADMIN — HYDROCODONE BITARTRATE AND ACETAMINOPHEN PRN EACH: 7.5; 325 TABLET ORAL at 11:32

## 2017-05-17 RX ADMIN — HYDROMORPHONE HYDROCHLORIDE PRN MG: 1 INJECTION, SOLUTION INTRAMUSCULAR; INTRAVENOUS; SUBCUTANEOUS at 01:04

## 2017-05-17 RX ADMIN — HEPARIN SODIUM SCH UNIT: 5000 INJECTION, SOLUTION INTRAVENOUS; SUBCUTANEOUS at 09:31

## 2017-05-17 RX ADMIN — IPRATROPIUM BROMIDE AND ALBUTEROL SULFATE SCH: .5; 3 SOLUTION RESPIRATORY (INHALATION) at 11:16

## 2017-05-17 RX ADMIN — IPRATROPIUM BROMIDE AND ALBUTEROL SULFATE SCH: .5; 3 SOLUTION RESPIRATORY (INHALATION) at 16:40

## 2017-05-17 RX ADMIN — Medication SCH MG: at 12:22

## 2017-05-17 RX ADMIN — LOSARTAN POTASSIUM SCH MG: 50 TABLET, FILM COATED ORAL at 09:31

## 2017-05-17 RX ADMIN — DILTIAZEM HYDROCHLORIDE SCH MG: 60 TABLET, FILM COATED ORAL at 15:47

## 2017-05-17 RX ADMIN — HYDROMORPHONE HYDROCHLORIDE PRN MG: 1 INJECTION, SOLUTION INTRAMUSCULAR; INTRAVENOUS; SUBCUTANEOUS at 07:30

## 2017-05-17 RX ADMIN — METOPROLOL TARTRATE SCH MG: 25 TABLET, FILM COATED ORAL at 15:47

## 2017-05-17 RX ADMIN — CEFAZOLIN SCH MLS/HR: 330 INJECTION, POWDER, FOR SOLUTION INTRAMUSCULAR; INTRAVENOUS at 06:29

## 2017-05-17 RX ADMIN — OXYCODONE HYDROCHLORIDE SCH MG: 10 TABLET, FILM COATED, EXTENDED RELEASE ORAL at 09:30

## 2017-05-17 RX ADMIN — HYDROCODONE BITARTRATE AND ACETAMINOPHEN PRN EACH: 7.5; 325 TABLET ORAL at 15:54

## 2017-05-17 RX ADMIN — POTASSIUM CHLORIDE SCH MEQ: 20 TABLET, EXTENDED RELEASE ORAL at 09:31

## 2017-05-17 RX ADMIN — HYDROCODONE BITARTRATE AND ACETAMINOPHEN PRN EACH: 7.5; 325 TABLET ORAL at 02:51

## 2017-05-17 RX ADMIN — HEPARIN SODIUM SCH UNIT: 5000 INJECTION, SOLUTION INTRAVENOUS; SUBCUTANEOUS at 15:47

## 2017-05-17 RX ADMIN — HYDROCODONE BITARTRATE AND ACETAMINOPHEN PRN EACH: 7.5; 325 TABLET ORAL at 05:06

## 2017-05-17 RX ADMIN — SUCRALFATE SCH GM: 1 TABLET ORAL at 09:30

## 2017-05-17 RX ADMIN — DIAZEPAM PRN MG: 5 INJECTION, SOLUTION INTRAMUSCULAR; INTRAVENOUS at 02:51

## 2017-05-17 RX ADMIN — PANTOPRAZOLE SODIUM SCH MG: 40 TABLET, DELAYED RELEASE ORAL at 06:30

## 2017-05-17 RX ADMIN — DILTIAZEM HYDROCHLORIDE SCH MG: 60 TABLET, FILM COATED ORAL at 09:30

## 2017-05-17 RX ADMIN — THERA TABS SCH EACH: TAB at 12:22

## 2017-05-17 RX ADMIN — METOPROLOL TARTRATE SCH MG: 25 TABLET, FILM COATED ORAL at 06:30

## 2017-05-17 RX ADMIN — LEVOFLOXACIN SCH: 5 INJECTION, SOLUTION INTRAVENOUS at 11:28

## 2017-05-17 NOTE — P.DS
Providers


Date of admission: 


05/11/17 01:04





Expected date of discharge: 05/17/17


Attending physician: 


Arturo Reis





Consults: 





 





05/11/17 01:05


Consult Physician Routine 


   Consulting Provider: Giancarlo Chambers


   Consult Reason/Comments: Medical management for preoperative clearance and 

rapid atrial


                                                fibrillation


   Do you want consulting provider notified?: Yes





05/12/17 08:46


Consult Physician Urgent 


   Consulting Provider: Ferdinand Dykes


   Consult Reason/Comments: surgical clearance


   Do you want consulting provider notified?: Yes





05/15/17 06:45


Consult Physician Routine 


   Consulting Provider: Giancarlo Mcdonough


   Consult Reason/Comments: wound


   Do you want consulting provider notified?: Yes











Primary care physician: 


Kinjal Hood








- Discharge Diagnosis(es)


(1) Intertrochanteric fracture of left hip


Current Visit: Yes   Status: Acute   





(2) Rapid atrial fibrillation


Current Visit: Yes   Status: Acute   


Hospital Course: 


This is a 71-year-old female who is admitted to [facility] on 

05/11/2017 after falling and sustaining injury to the left hip.  On exam and x-

ray in the emergency department she is found to have an intertrochanteric 

fracture of the left hip.  She is admitted to our service for surgical 

intervention and care. She has an extensive medical history.





After the patient was cleared by medicine, she was taken to surgery for close 

reduction and insertion of intertrochanteric nail of the left hip.  The 

procedure was performed without complication or sequelae.  The patient is doing 

fairly well postoperatively.  Vital signs and labs are stable on postoperative 

day #6.





Patient is discharged to inpatient rehab in good condition.  Please see med rec 

for accurate list of discharge medications.


Patient Condition at Discharge: Stable





Plan - Discharge Summary


New Discharge Prescriptions: 


Levofloxacin [Levaquin] 500 mg PO DAILY #7 tab


oxyCODONE ER [OxyCONTIN] 10 mg PO Q12HR #60 tab


Sennosides-Docusate Sodium [Senokot-S] 1 tab PO BID #60 tablet


Discharge Medication List





Montelukast Sodium [Singulair] 10 mg PO HS@2100 09/20/14 [History]


Metoprolol Tartrate [Lopressor] 25 mg PO TID@0600,1400,2200 12/05/15 [History]


Nitroglycerin Sl Tabs [Nitrostat] 0.4 mg SUBLINGUAL Q5M PRN 12/05/15 [History]


Atorvastatin [Lipitor] 40 mg PO HS 12/06/15 [History]


Ergocalciferol [Vitamin D2 (DRISDOL)] 50,000 unit PO ARGUETA 03/15/17 [History]


Ipratropium-Albuterol Nebulize [Duoneb 0.5 mg-3 mg/3 ml Soln] 3 ml INHALATION RT

-Q6H PRN 03/15/17 [History]


Melatonin 3 mg PO HS 03/15/17 [History]


Multivitamins, Thera [Multivitamin (formulary)] 1 tab PO DAILY 03/15/17 [History

]


Omeprazole 40 mg PO DAILY@0600 03/15/17 [History]


hydrALAZINE HCL [Apresoline] 75 mg PO BID@0900,2100 03/15/17 [History]


Aspirin 81 mg PO DAILY 04/04/17 [History]


Losartan Potassium [Cozaar] 100 mg PO DAILY@0900 04/04/17 [History]


Potassium Chloride ER [K-Dur 20] 20 meq PO DAILY 04/04/17 [History]


Ferrous Sulfate [Iron (65 MG Elemental)] 325 mg PO DAILY@1200 05/11/17 [History]


Sucralfate [Carafate] 1 gm PO BID@0900,2100 05/11/17 [History]


Sennosides-Docusate Sodium [Senokot-S] 1 tab PO BID #60 tablet 05/15/17 [Rx]


Diltiazem Oral [Cardizem*] 60 mg PO TID  tab 05/17/17 [Rx]


Furosemide [Lasix] 40 mg PO DAILY #0  05/17/17 [Rx]


Levofloxacin [Levaquin] 500 mg PO DAILY #7 tab 05/17/17 [Rx]


oxyCODONE ER [OxyCONTIN] 10 mg PO Q12HR #60 tab 05/17/17 [Rx]








Follow up Appointment(s)/Referral(s): 


Kinjal Hood MD [Primary Care Provider] - 1-2 days (at [facility])


Arturo Reis MD [STAFF PHYSICIAN] - 3 Weeks (OFFICE IS CLOSED.  PLEASE CALL TO 

MAKE APPOINTMENT)


Patient Instructions/Handouts:  Hip Fracture (GEN)


Activity/Diet/Wound Care/Special Instructions: 


Toe touch wt bearing as tolerated w walker.


May shower if no drainage from incisions.


Anticoagulation per medical management.


Discharge Disposition: TRANSFER TO SNF/ECF

## 2017-05-17 NOTE — P.PN
Subjective








71-year-old  female one of Dr. Hood's patient who was rehospitalized 

this past week with significant hypoxia and shortness of breath consistent with 

chronic diastolic heart failure with her diuretics was hold from the week 

before because of GI bleed and hypotension patient had significant fluid 

retention and significant shortness of breath was hospitalized until the 10th 

when patient was more stable ended up being sent back to the nursing home 

rehab.  Patient earlier the week before was in the hospital with 

gastrointestinal bleed been on anticoagulation for her A. fib her 

anticoagulation was stopped completely at the time.


Patient [place] 

after she left the hospital she doesn't remember having any syncopal episode or 

any sign and symptom of TIA or CVA she tripped and fell on the right side ended 

up having severe pain and discomfort in the left hip area was not been able to 

ambulate and walk ended up coming to the emergency department her x-ray of the 

hip showed intertrochanteric fracture patient will be admitted to Dr. Chato hernandez for surgery from medical standpoint.  Hemoglobin has been much 

better since 2 days ago when she had the transfusion and hemoglobin corrected 

up to 9.5.  Patient also found to be in A. fib with pulse rate running around 

100 beats per minutes.  Line again from early in the week she had significant 

encephalopathy due to hypoxia has improved correcting her anemia and shortness 

of breath.


Patient might be evaluated by cardiology before going for either 

hemiarthroplasty or ORIF.





5/12: Patient is currently on Cardizem drip.  She has been resumed on her home 

dose of Lopressor.  Cardiology consult in place with recommendations to 

maintain the Cardizem drip during the perioperative period.  Norvasc was added 

for accelerated hypertension.  Echocardiogram reveals mild tricuspid 

regurgitation, mild concentric left ventricular hypertrophy L a severely 

dilated greater than 40, moderate aortic sclerosis, mild aortic regurgitation 

.Patient is scheduled for ORIF this afternoon.





05/13: Patient is laying down in bed she is complaining of increased pain in 

the left hip, she denies any chest pain, no shortness of breath, she is very 

weak and not participating in even transferring from bed to the bedside chair.





5/14: Patient is laying down in bed she is complaining of increased pain in the 

left hip, she denies any chest pain, shortness breath, she wanted to go back to 

[facility] today, however I expected to the patient that she is currently on a 

Cardizem drip, and she is not ready to go back to [facility] hopefully in the next 

1 or 2 days.





5/15: Patient is off Cardizem drip and switch to oral.  BUN is 45 creatinine 

1.40.  Hemoglobin 7.6.  No transfusion ordered.  Patient has been seen by Dr. Alston covering for Dr. Mcdonough regarding deep tissue injuries to the bilateral 

heels left worse than right, stage II decubitus ulcer to coccyx.  She is 

complaining of severe pain for which she has been resumed on her home 

medication of oxycodone 10 mg every 12 hours and Dilaudid 1 mg every 6 hours 

and hydrocodone 10 mg every 4 hours as needed.  Anticipate possible discharge 

to [facility] tomorrow.





5/16: Hemoglobin is 7.7 and one unit of packed RBCs ordered.  Repeat chest x-

ray showed chronic interstitial pulmonary fibrosis.  Superimposed congestion or 

pneumonitis not excluded.  Patient started on Levaquin and continued on 

nebulizer treatments.  Anticipate possible discharge to [facility] tomorrow.





5/17: Hemoglobin is 8.3.  Patient is being prepared for discharge back to 

[facility].  Anticoagulation will be clarified to aspirin 81 mg twice daily due to 

her extensive history of bleeding.





Objective





- Vital Signs


Vital signs: 


 Vital Signs











Temp  97.6 F   05/17/17 04:00


 


Pulse  75   05/17/17 04:00


 


Resp  16   05/17/17 04:00


 


BP  117/66   05/17/17 04:00


 


Pulse Ox  95   05/17/17 04:00








 Intake & Output











 05/16/17 05/17/17 05/17/17





 18:59 06:59 18:59


 


Intake Total 1950 1050 180


 


Output Total  2 


 


Balance 1950 1048 180


 


Weight  71 kg 


 


Intake:   


 


  Intake, IV Titration 1000 750 





  Amount   


 


    Levofloxacin 500Mg-D5w 100  





    Pmx 500 mg In Dextrose/   





    Water 1 100ml.bag @ 100   





    mls/hr IVPB Q24H [facility] Rx#:   





    [id number]   


 


    Sodium Chloride 0.9% 1, 900 750 





    000 ml @ 75 mls/hr IV .   





    [id number] CORBIN Rx#:[id number]   


 


  Oral 640 300 180


 


  Blood Product 310  


 


    Rc Pheresis 2 As3  Unit 310  





    [id number]   


 


Output:   


 


  Urine/Stool Mix  2 


 


Other:   


 


  Voiding Method Bedpan Bedpan 





 Incontinent Incontinent 


 


  # Voids 2 2 














- Exam





General appearance: no average body habitus, cooperative, no disheveled, no 

mild distress, no morbidly obese, no acute distress, no obese, no severe 

distress, no thin





- EENT


Eyes: no abnormal pupil, no anicteric sclerae, no disc margins sharp, no 

edentulous, no EOMI, no PERRLA, no fundus normal, no photophobia, no dentition 

normal, no poor dentition, no ptosis, no scleral icterus, normal appearance


ENT: hard of hearing, no hearing grossly normal, no NA/AT, normal oropharynx, 

no other, no pharyngeal erythema, no thrush, no tonsillar exudates, no 

tonsillar swelling


Ears: bilateral: normal, bulging





- Neck


Neck: no lymphadenopathy, normal ROM, no other, no rigidity, no stridor, no 

thyromegaly


Carotids: bilateral: upstroke normal


Thyroid: bilateral: normal size





- Respiratory


Respiratory: bilateral: CTA, diminished





- Cardiovascular


Rhythm: irregularly irregular


Heart sounds: normal: S1, S2


Abnormal Heart Sounds: systolic murmur, no diastolic murmur, no rub, S3 Gallop, 

no S4 Gallop, no click, no other





- Gastrointestinal


General gastrointestinal: no absent bowel sounds, decreased bowel sounds, 

distended, no hepatomegaly, no hyperactive bowel sounds, no normal bowel sounds

, no organomegaly, no rigid, scaphoid, soft, no splenomegaly, no tenderness, no 

umbilical hernia, no ventral hernia





- Integumentary





Left hip and groin area quite bit painful mild deformity and left leg has an 

external rotation with short leg the left compared to the right.


Integumentary: no calor, no cellulitis, no cyanotic, no decreased turgor, no 

flushed, no jaundiced, normal, no normal turgor, pale, rash, unstageable 

decubitus ulcer to the bilateral heels left worse than right, stage II 

decubitus ulcer to coccyx





- Neurologic


Neurologic: CNII-XII intact





- Musculoskeletal


Musculoskeletal: gait normal, generalized weakness, strength equal bilaterally, 

no right sided weakness, no left sided weakness





- Psychiatric


Psychiatric: A&O x's 3, appropriate affect, no intact judgment & insight





- Labs


CBC & Chem 7: 


 05/17/17 06:20





 05/17/17 06:20


Labs: 


 Abnormal Lab Results - Last 24 Hours (Table)











  05/16/17 05/17/17 05/17/17 Range/Units





  10:18 06:20 06:20 


 


WBC   14.2 H   (3.8-10.6)  k/uL


 


RBC   3.25 L   (3.80-5.40)  m/uL


 


Hgb   8.3 L   (11.4-16.0)  gm/dL


 


Hct   27.4 L   (34.0-46.0)  %


 


MCHC   30.2 L   (31.0-37.0)  g/dL


 


RDW   18.9 H   (11.5-15.5)  %


 


BUN    44 H  (7-17)  mg/dL


 


Calcium    8.3 L  (8.4-10.2)  mg/dL


 


Crossmatch  See Detail    








 Microbiology - Last 24 Hours (Table)











 05/15/17 09:15 Urine Culture - Final





 Urine,Catheterized 


 


 05/15/17 07:15 Blood Culture - Preliminary





 Blood    No Growth after 24 hours














Assessment and Plan


Plan: 





1 left hip fracture: Patient will be going for surgical intervention most 

likely ORIF and intertrochanteric nail of the left hip.  Patient will be on 

aspirin 81 mg twice daily for DVT prophylaxis only due to history of GI bleed





2 chronic diastolic heart failure.  Continue Lasix 60 mg twice daily.





3 Recent history of GI bleed: Has been off anticoagulation with hemoglobin is 

up to 9.3 with no active bleed currently.





4 CAD: Post PCI of the LAD has been on secondary prevention been on metoprolol, 

Lipitor, losartan and aspirin.





5 history of CVA: Affected the left frontal lobe brought by ANSRA lorene with RVR her 

Eliquis has been on hold due to GI bleed





6 PAD: With mild carotid stenosis patient has been on secondary prevention no 

surgical intervention required.





7 hypertension with episode of accelerated hypertension for which Norvasc has 

been added: Remain on losartan 100 mg a day along with metoprolol 25 g three 

times a day and hydralazine 75 g twice a day.





8 hyperlipidemia: Continue Lipitor at 40 mg daily.





9 NASRA lorene with RVR with chronic atrial fibrillation: Continue Cardizem and 

metoprolol





10 COPD/asthma: Remain on DuoNeb along with Singulair and O2 as needed.





11 anemia: With acute blood loss secondary to GI bleed posttransfusion few days 

ago has hemoglobin up to 9.3 continue secondary prevention with PPI on 

omeprazole 40 mg daily.





12 DVT prophylaxis: Patient will be on heparin subcutaneous post surgery.  

Aspirin 81 mg twice daily at the nursing home





13 GI prophylaxis: Patient will continue on omeprazole.





14 deep tissue injuries to the bilateral heels (hospital acquired) and stage II 

decubitus ulcer to the coccyx (present on admission.  Consult with Dr. Alston.  

Continue local care.





15 possible pneumonia for which Levaquin started.  Continue DuoNeb treatments.





CODE STATUS: Full code.





Discharge plan: Return to [facility]





Impression and plan of care have been directed as dictated by the signing 

physician.  Danielle Herbert nurse practitioner acting as scribe for signing 

physician.

## 2017-06-05 ENCOUNTER — HOSPITAL ENCOUNTER (OUTPATIENT)
Dept: HOSPITAL 47 - RADUSWWP | Age: 72
End: 2017-06-05
Payer: MEDICARE

## 2017-06-05 DIAGNOSIS — Z96.642: ICD-10-CM

## 2017-06-05 DIAGNOSIS — S72.142E: Primary | ICD-10-CM

## 2017-06-05 PROCEDURE — 93970 EXTREMITY STUDY: CPT

## 2017-06-05 NOTE — US
EXAMINATION TYPE: US venous doppler duplex LE 

 

DATE OF EXAM: 6/5/2017 4:05 PM

 

COMPARISON: US

 

CLINICAL HISTORY: S72.142E Displaced intertrochanteric fracture, Phl. Bilateral leg pain and swelling
 x 1 month

 

SIDE PERFORMED: Bilateral  

 

TECHNIQUE:  The lower extremity deep venous system is examined utilizing real time linear array sonog
rosie with graded compression, doppler sonography and color-flow sonography.

 

VESSELS IMAGED:

External Iliac Vein (EIV)

Common Femoral Vein

Deep Femoral Vein

Greater Saphenous Vein *

Femoral Vein

Popliteal Vein

Small Saphenous Vein *

Proximal Calf Veins

(* superficial vessels)

 

Right Leg:  Appears negative for DVT

 

Left Leg:  Appears negative for DVT

 

No popliteal fossa lesion is seen.

 

IMPRESSION:

 

THIS EXAMINATION IS NEGATIVE FOR DVT IN BOTH LEGS.

## 2018-04-17 ENCOUNTER — HOSPITAL ENCOUNTER (OUTPATIENT)
Dept: HOSPITAL 47 - RADMRIMAIN | Age: 73
Discharge: HOME | End: 2018-04-17
Payer: MEDICARE

## 2018-04-17 DIAGNOSIS — M51.36: ICD-10-CM

## 2018-04-17 DIAGNOSIS — S72.002D: ICD-10-CM

## 2018-04-17 DIAGNOSIS — M48.061: Primary | ICD-10-CM

## 2018-04-17 DIAGNOSIS — M51.27: ICD-10-CM

## 2018-04-17 DIAGNOSIS — Z98.890: ICD-10-CM

## 2018-04-17 DIAGNOSIS — M99.73: ICD-10-CM

## 2018-04-17 LAB — BUN SERPL-SCNC: 18 MG/DL (ref 7–17)

## 2018-04-17 PROCEDURE — 72158 MRI LUMBAR SPINE W/O & W/DYE: CPT

## 2018-04-17 PROCEDURE — 84520 ASSAY OF UREA NITROGEN: CPT

## 2018-04-17 PROCEDURE — 82565 ASSAY OF CREATININE: CPT

## 2018-04-17 PROCEDURE — 36415 COLL VENOUS BLD VENIPUNCTURE: CPT

## 2018-04-18 NOTE — MR
EXAMINATION TYPE: MR lumbar spine wo/w con

 

DATE OF EXAM: 4/17/2018

 

COMPARISON: NONE

 

HISTORY: Low back pain / Disc degeneration

 

TECHNIQUE: 

Multiplanar, multisequence images of the lumbar spine were acquired utilizing 6.5 mL intravenous Gada
vist gadolinium contrast.    

 

FINDINGS:

Vertebral body heights are maintained. There is mild retrolisthesis of L4 with respect to L5 and L2 w
ith respect to L3. Degenerative endplate changes and small anterior osteophytes are seen throughout t
he lumbar spine. Bone marrow signal is unremarkable. Conus medullaris is also within normal limits te
rminating at T12-L1.

 

L1-L2: There is a central disc herniation superimposed upon a broad-based disc bulge with extrusion a
nd 9 mm caudal disc extension. There is also facet arthropathy and minimal ligamentum flavum buckling
 contributing to mild spinal canal stenosis and moderate right as well as mild left neural foraminal 
narrowing.

 

L2-L3: Left greater than right facet arthropathy is present in combination with a large bilobed broad
-based disc bulge and right foraminal annular tear creating mild spinal canal stenosis, moderate left
 neural foraminal narrowing and mild right neural foraminal narrowing.

 

L3-L4: A left foraminal disc herniation with annular tear creates severe left neural foraminal narrow
ing in combination with facet arthropathy and a broad-based disc bulge. There is resection of the spi
nous process and posterior elements at this level and no spinal canal stenosis is seen. There is mild
 right neural foraminal narrowing as result of the broad-based disc bulge.

 

L4-L5: Spinous process and posterior element resection is seen at this level. There is a small centra
l disc herniation superimposed upon a large broad-based disc bulge with facet arthropathy that create
s severe right and moderate left neural foraminal narrowing.

 

L5-S1: There is a small central disc herniation superimposed upon a broad-based disc bulge and mild f
acet arthropathy contributing to minimal right neural foraminal narrowing. Spinal canal and neural fo
ramen are patent.

 

There is no abnormal postcontrast enhancement and no evidence of epidural fibrosis. Partial visualiza
tion of bilateral T2 hyperintense renal lesions as well as punctate T2 hypointense renal lesions in t
he right upper pole are present. The common bile duct also appears enlarged. Gallbladder is not visua
lized within the field-of-view.

 

IMPRESSION:

1. Postsurgical changes of the lower lumbar spine without evidence of abnormal enhancement to suggest
 epidural fibrosis.

2. Left foraminal disc herniation at L3-L4 creating severe left neural foraminal narrowing. There is 
additional mild right neural foraminal narrowing at this level.

3. Central disc herniation at L1-L2 with 9 mm caudal extrusion. There is resultant mild spinal canal 
stenosis, moderate right neural foraminal narrowing and mild left neural foraminal narrowing.

4. Annular tear at L2-L3 and degenerative disc disease crating mild spinal canal stenosis, moderate l
eft neural foraminal narrowing and mild right neural foraminal narrowing.

5. Small central disc herniation at L5-S1 without spinal canal stenosis.

6. Small central disc herniation at L4-L5 with degenerative disc disease creating severe right neural
 foraminal narrowing and moderate left neural foraminal narrowing. No spinal canal stenosis.

7. Indeterminate renal lesions (some T2 hyperintense of some T2 hypointense) for which further evalua
tion with abdominal ultrasound could be performed. Additionally common bile duct enlargement could be
 assessed on that examination.

## 2018-05-01 ENCOUNTER — HOSPITAL ENCOUNTER (OUTPATIENT)
Dept: HOSPITAL 47 - RADUSWWP | Age: 73
Discharge: HOME | End: 2018-05-01
Payer: MEDICARE

## 2018-05-01 DIAGNOSIS — R94.4: Primary | ICD-10-CM

## 2018-05-01 PROCEDURE — 76700 US EXAM ABDOM COMPLETE: CPT

## 2018-05-01 NOTE — US
EXAMINATION TYPE: US abdomen complete

 

DATE OF EXAM: 5/1/2018

 

COMPARISON: CT chest abdomen pelvis April 7, 2017. Complete abdominal ultrasound April 4, 2017

 

CLINICAL HISTORY: R94.4 ABN RESULTS OF KIDNEY FUNCTIONS. Cholecystectomy, abnormal kidney function, n
ausea for 1 week

 

EXAM MEASUREMENTS:

 

Liver Length:  10.7 cm   

Gallbladder Wall:  surgically absent   

CBD:  1.3 cm

Spleen:  9.1 cm   

Right Kidney:  10.8 x 5.0 x 4.8 cm 

Left Kidney:  9.7 x 5.3 x 4.8 cm   

 

**Technical limitations due to large amount of overlying bowel content 

 

Pancreas:  Obscured by bowel gas

Liver:  limited evaluation 

Gallbladder:  surgically absent 

**Evidence for sonographic Ham's sign:  no

CBD:  dilated 

Spleen:  appears wnl   

Right Kidney:  cystic area upper pole = 1.1 x 1.1 x 1.1cm   

Left Kidney:  limited evaluation, cystic areas noted, largest = 2.2 x 2.5 x 2.0cm   

Upper IVC:  wnl  

Abd Aorta:  Obscured by overlying bowel gas, only proximal seen, calcifications noted 

 

 

 

The visualized liver is homogenous.  The intrahepatic portion of the IVC and visualized proximal abdo
zach aorta are within normal limits. Mid to distal abdominal aorta are obscured by overlying bowel g
as. Gallbladder is surgically absent.  Common bile duct is mildly dilated at 13 mm not significantly 
changed from prior CT and slightly less prominent versus prior ultrasound.  The pancreas is suboptima
lly evaluated due to shadowing from overlying bowel gas on images saved.  The spleen is unremarkable.
  Kidneys are symmetric and free of hydronephrosis. A few simple appearing cysts in both kidneys are 
marked by technologist. Cortical thinning in the left kidney is noted..

 

IMPRESSION: No hydronephrosis is evident bilaterally. Some asymmetric cortical thinning in left kidne
y is felt present on current ultrasound.

## 2018-05-14 ENCOUNTER — HOSPITAL ENCOUNTER (OUTPATIENT)
Dept: HOSPITAL 47 - PNWHC3 | Age: 73
Discharge: HOME | End: 2018-05-14
Attending: ANESTHESIOLOGY
Payer: MEDICARE

## 2018-05-14 VITALS
DIASTOLIC BLOOD PRESSURE: 61 MMHG | SYSTOLIC BLOOD PRESSURE: 142 MMHG | HEART RATE: 66 BPM | TEMPERATURE: 98.4 F | RESPIRATION RATE: 16 BRPM

## 2018-05-14 VITALS — BODY MASS INDEX: 21.4 KG/M2

## 2018-05-14 DIAGNOSIS — Z79.899: ICD-10-CM

## 2018-05-14 DIAGNOSIS — I50.9: ICD-10-CM

## 2018-05-14 DIAGNOSIS — Z79.52: ICD-10-CM

## 2018-05-14 DIAGNOSIS — E78.5: ICD-10-CM

## 2018-05-14 DIAGNOSIS — Z86.73: ICD-10-CM

## 2018-05-14 DIAGNOSIS — I25.10: ICD-10-CM

## 2018-05-14 DIAGNOSIS — Z90.49: ICD-10-CM

## 2018-05-14 DIAGNOSIS — I34.1: ICD-10-CM

## 2018-05-14 DIAGNOSIS — J44.9: ICD-10-CM

## 2018-05-14 DIAGNOSIS — M48.061: ICD-10-CM

## 2018-05-14 DIAGNOSIS — I48.91: ICD-10-CM

## 2018-05-14 DIAGNOSIS — M99.73: ICD-10-CM

## 2018-05-14 DIAGNOSIS — I25.2: ICD-10-CM

## 2018-05-14 DIAGNOSIS — I11.0: ICD-10-CM

## 2018-05-14 DIAGNOSIS — M19.90: ICD-10-CM

## 2018-05-14 DIAGNOSIS — Z79.82: ICD-10-CM

## 2018-05-14 DIAGNOSIS — Z79.51: ICD-10-CM

## 2018-05-14 DIAGNOSIS — K21.9: ICD-10-CM

## 2018-05-14 DIAGNOSIS — F17.200: ICD-10-CM

## 2018-05-14 DIAGNOSIS — Z98.890: ICD-10-CM

## 2018-05-14 DIAGNOSIS — M54.5: ICD-10-CM

## 2018-05-14 DIAGNOSIS — Z79.1: ICD-10-CM

## 2018-05-14 DIAGNOSIS — G89.29: Primary | ICD-10-CM

## 2018-05-14 DIAGNOSIS — Z79.891: ICD-10-CM

## 2018-05-14 DIAGNOSIS — R32: ICD-10-CM

## 2018-05-14 PROCEDURE — 99211 OFF/OP EST MAY X REQ PHY/QHP: CPT

## 2018-05-14 PROCEDURE — 80356 HEROIN METABOLITE: CPT

## 2018-05-14 NOTE — P.CONS
History of Present Illness





- Reason for Consult


Consult date: 05/14/18





- Chief Complaint


Lower back and legs pain





- History of Present Illness





This is a 72-year-old pleasant lady with chronic history of lower back pain and 

recent bilateral leg pain mostly above the knee level with numbness in the 

thighs anteriorly.  She also feels pain in both feet.  She has chronic urinary 

incontinence and she denies any stool incontinence.  She has weakness in both 

legs as she states.  The patient has been on OxyContin 20 mg 3 times a day.  

Lately her physician cut her dose to twice a day.  The pain prevents the 

patient from falling asleep at night.  It affects her daily activities 

significantly.  She had surgery on the lumbar spine more than 12 years ago.  

She is getting physical therapy at this point with no benefit so far as she 

states.  The patient stated that she lost 20 or 30 pounds of her weight over 

the last few months because of lack of appetite and she mentioned that to her 

primary care physician.  Her MRI shows severe degenerative disc disease and 

severe neuroforaminal stenosis atL3 4 levels also at L4-5 level.  She has 

severe left neural foraminal stenosis at the L3 4 level and severe right neural 

foraminal stenosis at the L4 5 level.





Review of Systems


Cardiovascular: Denies as per HPI, Denies chest pain, Denies claudication, 

Denies decreased exercise tolerance, Denies dyspnea on exertion, Denies edema, 

Denies high blood pressure, Denies irregular heart beat, Denies leg edema, 

Denies lightheadedness, Denies orthopnea, Denies palpitations, Denies 

paroxysmal nocturnal dyspnea, Denies phlebitis, Denies rapid heart beat, Denies 

shortness of breath, Denies syncope


Respiratory: Denies as per HPI, Denies congestion, Denies cough, Denies cough 

with sputum, Denies dyspnea, Denies excessive sputum, Denies hemoptysis, Denies 

home oxygen, Denies pain, Denies pain on inspiration, Denies pleurisy, Denies 

respiratory infections, Denies sleep apnea, Denies snoring, Denies wheezing


Musculoskeletal: Reports as per HPI


Neurological: Reports as per HPI





Past Medical History


Past Medical History: Atrial Fibrillation, Asthma, Coronary Artery Disease (CAD)

, Heart Failure, COPD, CVA/TIA, GERD/Reflux, GI Bleed, Hyperlipidemia, 

Hypertension, Myocardial Infarction (MI), Mitral Valve Prolapse (MVP), 

Musculoskeletal Disorder, Osteoarthritis (OA), Pneumonia, Respiratory Disorder, 

Vascular Disorder


Additional Past Medical History / Comment(s): diverticulitits, lower GI bleed, 

anemia, MVP, PAD, L caratid disease, silent MI, CVA L frontal.


Last Myocardial Infarction Date:: unknown


History of Any Multi-Drug Resistant Organisms: None Reported


Past Surgical History: Adenoidectomy, Appendectomy, Back Surgery, Bladder 

Surgery, Cholecystectomy, Heart Catheterization With Stent, Hysterectomy, 

Orthopedic Surgery, Tonsillectomy, Tubal Ligation


Additional Past Surgical History / Comment(s): R carotid, cervical epidural 

injections, angiogram, sinus sx, EGD/colonoscopy, bladder suspension, stent L 

leg.


Past Anesthesia/Blood Transfusion Reactions: No Reported Reaction


Additional Past Anesthesia/Blood Transfusion Reaction / Comm: Pt has received 

blood in past without reaction.


Date of Last Stent Placement:: 2011


Smoking Status: Current every day smoker





- Past Family History


  ** Son(s)


Family Medical History: No Reported History





  ** Daughter(s)


Family Medical History: No Reported History





  ** Mother


Family Medical History: Congestive Heart Failure (CHF)





  ** Father


Family Medical History: Cancer


Additional Family Medical History / Comment(s): Bone ca





  ** Sister(s)


Family Medical History: Coronary Artery Disease (CAD)





  ** Brother(s)


Family Medical History: Coronary Artery Disease (CAD), Myocardial Infarction (MI

)





Medications and Allergies


 Home Medications











 Medication  Instructions  Recorded  Confirmed  Type


 


Montelukast Sodium [Singulair] 10 mg PO HS@2100 09/20/14 05/14/18 History


 


Metoprolol Tartrate [Lopressor] 25 mg PO TID@0600,1400,2200 12/05/15 05/14/18 

History


 


Nitroglycerin Sl Tabs [Nitrostat] 0.4 mg SUBLINGUAL Q5M PRN 12/05/15 05/14/18 

History


 


Atorvastatin [Lipitor] 40 mg PO HS 12/06/15 05/14/18 History


 


Ipratropium-Albuterol Nebulize 3 ml INHALATION RT-Q6H PRN 03/15/17 05/14/18 

History





[Duoneb 0.5 mg-3 mg/3 ml Soln]    


 


Omeprazole 40 mg PO DAILY@0600 03/15/17 05/14/18 History


 


hydrALAZINE HCL [Apresoline] 75 mg PO BID@0900,2100 03/15/17 05/14/18 History


 


Losartan Potassium [Cozaar] 100 mg PO DAILY@0900 04/04/17 05/14/18 History


 


Potassium Chloride ER [K-Dur 20] 20 meq PO DAILY 04/04/17 05/14/18 History


 


Aspirin 81 mg PO BID #0 05/17/17 05/14/18 Rx


 


Diltiazem Oral [Cardizem*] 60 mg PO TID  tab 05/17/17 05/14/18 Rx


 


oxyCODONE ER [OxyCONTIN] 20 mg PO Q8HR 09/25/17 05/14/18 History


 


Sucralfate [Carafate] 1 gm PO DAILY 10/16/17 05/14/18 History


 


Magnesium Oxide 1 tab PO BID 11/06/17 05/14/18 History


 


Ibuprofen [Motrin] 800 mg PO BID 05/14/18 05/14/18 History











 Allergies











Allergy/AdvReac Type Severity Reaction Status Date / Time


 


diphenhydramine Allergy  Rash/Hives Verified 05/14/18 13:43





[From Benadryl]     


 


Latex, Natural Rubber Allergy  Rash/Hives Verified 05/14/18 13:43


 


morphine Allergy  Rash/Hives Verified 05/14/18 13:43


 


pregabalin [From Lyrica] Allergy  Nausea Verified 05/14/18 13:43


 


Sulfa (Sulfonamide Allergy  Rash/Hives Verified 05/14/18 13:43





Antibiotics)     














Physical Exam


Vitals: 


 Vital Signs











  Temp Pulse Resp BP Pulse Ox


 


 05/14/18 13:52  98.4 F  66  16  142/61  96














- Constitutional


General appearance: average body habitus





- EENT


Eyes: PERRLA





- Respiratory


Respiratory: bilateral: CTA





- Neurologic


Neurologic: CNII-XII intact





- Psychiatric


Psychiatric: A&O x's 3, appropriate affect, intact judgment & insight





Neuro exam of the lower extremities showed decreased muscle strength for 

flexion to 3 out of 5 and decreased knee flexion and extension to 4 out of 5 

normal ankle flexion and extension to 5 out of 5 bilaterally and symmetrically.

  She has decreased but symmetrical knee reflexes and absent ankle reflexes 

bilaterally and symmetrically.  Straight leg raising test negative bilaterally.

  She has tenderness around the left sacroiliac joint.  She also has tenderness 

around her scar from her previous back surgery that she had.





Assessment and Plan


Plan: 





This is a 72-year-old female with history of chronic back pain with recent 

extension to the lower extremities mostly above her knee levels.  Her MRI shows 

significant neural foraminal stenosis at the L3 4 level on the left side and at 

the L4 5 level on the right side.  She has decreased muscle strength especially 

for hip flexion bilaterally.  The patient also takes OxyContin 20 mg 3 times a 

day and lately twice a day.


The patient may benefit from getting lumbar epidural steroid injection versus 

caudal epidural steroid injection with lysis of adhesions depending on the x-

ray view on time of procedure.  The procedure was explained to the patient and 

her questions were answered.  The patient denies taking any anti-coagulants.


I will do urine drug screen on the patient today and I'll give her prescription 

for 10 mg of OxyContin every 8 hours and 90 pills for the month.

## 2018-06-11 ENCOUNTER — HOSPITAL ENCOUNTER (OUTPATIENT)
Dept: HOSPITAL 47 - ORPAIN | Age: 73
Discharge: HOME | End: 2018-06-11
Attending: ANESTHESIOLOGY
Payer: MEDICARE

## 2018-06-11 VITALS — TEMPERATURE: 98.5 F

## 2018-06-11 VITALS — DIASTOLIC BLOOD PRESSURE: 75 MMHG | HEART RATE: 68 BPM | SYSTOLIC BLOOD PRESSURE: 177 MMHG

## 2018-06-11 VITALS — BODY MASS INDEX: 22.1 KG/M2

## 2018-06-11 VITALS — RESPIRATION RATE: 18 BRPM

## 2018-06-11 DIAGNOSIS — M96.1: Primary | ICD-10-CM

## 2018-06-11 DIAGNOSIS — Z91.040: ICD-10-CM

## 2018-06-11 DIAGNOSIS — M51.16: ICD-10-CM

## 2018-06-11 PROCEDURE — 62264 EPIDURAL LYSIS ON SINGLE DAY: CPT

## 2018-06-11 PROCEDURE — 99152 MOD SED SAME PHYS/QHP 5/>YRS: CPT

## 2018-06-11 NOTE — P.PCN
Date of Procedure: 06/11/18


Surgeon: Mansoor Oliva


Description of Procedure: 





PREOP DIAGNOSIS:  Lumbar postlaminectomy syndrome,severe DDD





POSTOP DIAGNOSIS:  Same as above





PROCEDURE:    Caudal epidural steroid injection with epidurolysis and 

epidurogram under fluoroscopic guidance








ANESTHESIA:    Local with 1% lidocaine; IV moderate conscious sedation 





EBL:  Minimal.





PROCEDURE INDICATION: The patient with post-laminectomy syndrome with low back 

pain and radiculopathy radiating down in both legs, here for a caudal epidural 

steroid injection with epidurolysis.





 PROCEDURE DESCRIPTION: The patient was seen in the preoperative holding area 

consent was obtained then he was brought into the procedure room and placed in 

prone position.  Skin was prepped with ChloraPrep and draped in a sterile 

manner.  Lidocaine 1% was used to numb the skin up at the target point that was 

chosen as follows: The lateral view of fluoroscopy was used to identify the 

sacral hiatus and then after localizing the skin with lidocaine 1% I used 18-

gauge epidural needle with a plastic sheath to go through the sacral hiatus and 

into the sacral canal and then injected 1 mL of Omnipaque for verification of 

needle tip position. After that the metal core of the needle was taken out and 

the plastic sheath was kept in the sacral canal.  Then Racz catheter was 

introduced through the plastic sheath and into the epidural space at the sacral 

canal using the AP view of fluoroscopy up to L5-S1 level then I injected 2 MLS 

of Omnipaque which showed limited spread in the epidural space and after few 

back and forth movements of the Racz catheter I was able to introduce the 

needle one level higher to the L4 5 level and then there was more spread of the 

Omnipaque after injecting 3 more mils of Omnipaque on the AP view of 

fluoroscopy.  After that I injected 60 mg of Kenalog +2 MLS of Marcaine 0.5% +7 

MLS of preservative-free normal saline to a total volume of 10 MLS in the 

epidural space.  Patient tolerated procedure well. Of note: The Racz catheter 

was introduced to the right side of midline because most of the patient's pain 

is on the right side of the lower back and also down the right leg to the right 

foot .





COMPLICATIONS: None.





DISPOSITION / PLANS: The patient was placed in a supine position and 

transferred to the recovery area in a stable condition for observation and was 

discharged from the recovery room after meeting discharge criteria.  Home 

discharge instructions given to the patient by the staff. The patient was 

reexamined prior to discharge. The patient will schedule a second injection in 2

-4 weeks.  The patient tested positive for marijuana today however she denies 

it completely and given her age I tend to believe her and I think this might be 

a false-positive result.  We will repeat the UDS on her next visit.  The 

patient will give her prescription for oxycodone 10 tid today.

## 2018-06-27 ENCOUNTER — HOSPITAL ENCOUNTER (OUTPATIENT)
Dept: HOSPITAL 47 - ORPAIN | Age: 73
End: 2018-06-27
Payer: MEDICARE

## 2018-06-27 VITALS — BODY MASS INDEX: 22.1 KG/M2

## 2018-06-27 DIAGNOSIS — M96.1: Primary | ICD-10-CM

## 2018-06-27 DIAGNOSIS — Z88.2: ICD-10-CM

## 2018-06-27 DIAGNOSIS — I25.10: ICD-10-CM

## 2018-06-27 DIAGNOSIS — I48.91: ICD-10-CM

## 2018-06-27 DIAGNOSIS — Z88.5: ICD-10-CM

## 2018-06-27 DIAGNOSIS — Z88.8: ICD-10-CM

## 2018-06-27 DIAGNOSIS — Z91.040: ICD-10-CM

## 2018-06-27 PROCEDURE — 80356 HEROIN METABOLITE: CPT

## 2018-06-27 PROCEDURE — 80364 OPIOID &OPIATE ANALOG 5/MORE: CPT

## 2018-06-27 PROCEDURE — 62264 EPIDURAL LYSIS ON SINGLE DAY: CPT

## 2018-06-27 PROCEDURE — 80307 DRUG TEST PRSMV CHEM ANLYZR: CPT

## 2018-06-27 PROCEDURE — 99152 MOD SED SAME PHYS/QHP 5/>YRS: CPT

## 2018-06-27 NOTE — FL
Fluoroscopy

 

HISTORY: Pain

 

5 seconds fluoroscopy time supplied to the referring clinician.  3 intraoperative C-arm images docume
nt the procedure. See dictated report from anesthesia.

## 2018-06-27 NOTE — P.PCN
Date of Procedure: 06/27/18


Procedure(s) Performed: 








PREOP DIAGNOSIS: 1- Lumbar postlaminectomy syndrome


POSTOP DIAGNOSIS:1-  Lumbar postlaminectomy syndrome


PROCEDURE: Caudal epidural steroid injection with epidurolysis and epidurogram 

under fluoroscopic guidance


ANESTHESIA: Local with 1% lidocaine 3 ml ,and moderate sedation, with Versed  2 

mg and fentanyl 100 g


EBL:  Minimal.


PROCEDURE INDICATION: The patient with post-laminectomy syndrome with low back 

pain and radiculopathy radiating down in both legs, here for a caudal epidural 

steroid injection with epidurolysis.


 PROCEDURE DESCRIPTION: The patient was seen and identified in the preoperative 

area. Risks, benefits, complications, and alternatives were discussed with the 

patient. The patient agreed to proceed with the procedure and signed the 

consent. IV was started, and vital signs were stable.


Patient was taken to the OR and time out was completed.  The patient was placed 

in the prone position on procedure table and a pillow was placed under the 

abdomen to reduce lumbar lordosis. The lumbosacral area was prepped and draped 

in the usual sterile fashion. Vital signs were closely monitored during the 

procedure.   lateral view and the anterior-posterior plates of the sacrum were 

identified with infiltration of the area overlying the sacral hiatus with 1% 

lidocaine .A 17 gauge RK epidural needle was used to advance through the sacral 

hiatus into the caudal epidural space. Omnipaque 180 dye.  2cc was injected and 

the position of the needle was verified to be in the midline. A Racz catheter 

was introduced into the epidural space and was advanced towards the L5-S1 

interspace under direct fluoroscopic guidance. Multiple passes were made with 

the catheter for lysis of epidural adhesions.


Depomedrole  80 mg with 3ml of preservative free Lidocaine 1% and 5 ml of 

preservative free normal saline was injected slowly. Additional spread was seen 

to L4 under fluoroscopy. The needle and the catheter were withdrawn intact.


EPIDUROGRAM: Omnipaque 180 mg  dye 2 ml was injected with spread of the dye 

into the caudal epidural space and with spread cutoff at L5  prior to 

epidurolysis. Post epidurolysis dye 2 ml was injected and spread was seen to L3-

4.There was further spread of the solution together with the dye above the L3


COMPLICATIONS: None.


DISPOSITION / PLANS: The patient was placed in a supine position and 

transferred to the recovery area in a stable condition for observation and was 

discharged from the recovery room after meeting discharge criteria.  Home 

discharge instructions given to the patient by the staff. The patient was 

reexamined prior to discharge. The patient will schedule a follow up in the 

clinic in 2-4 weeks.

## 2018-07-09 ENCOUNTER — HOSPITAL ENCOUNTER (OUTPATIENT)
Dept: HOSPITAL 47 - PNWHC3 | Age: 73
Discharge: HOME | End: 2018-07-09
Attending: ANESTHESIOLOGY
Payer: MEDICARE

## 2018-07-09 VITALS — DIASTOLIC BLOOD PRESSURE: 74 MMHG | SYSTOLIC BLOOD PRESSURE: 174 MMHG | HEART RATE: 63 BPM | RESPIRATION RATE: 16 BRPM

## 2018-07-09 DIAGNOSIS — M99.73: ICD-10-CM

## 2018-07-09 DIAGNOSIS — M96.1: Primary | ICD-10-CM

## 2018-07-09 DIAGNOSIS — Z79.891: ICD-10-CM

## 2018-07-09 DIAGNOSIS — M51.36: ICD-10-CM

## 2018-07-09 PROCEDURE — 99211 OFF/OP EST MAY X REQ PHY/QHP: CPT

## 2018-07-09 NOTE — P.PN
Subjective


Progress Note Date: 07/09/18


This is a 72-year-old female with history of lumbar postlaminectomy pain 

syndrome and lower back pain with radiation to the thighs bilaterally.  The 

patient had caudal epidural steroid injection 2 that helped her lower back 

pain however did not help her legs pain.  She does feel increasing pain in the 

left groin area and didn't have femur fracture on the left side with surgery on 

the left hip.  She still uses 10 mg of oxycodone 3 times a day.





Her repeat urine drug screen came back negative for THC.





Today, pt denies new-onset weakness, bowel/bladder incontinence, or any other 

signs or symptoms of cauda equina syndrome. There are no signs of acute 

intoxication, and no indications of medication diversion or overuse.





In addition to above, 13-point review of systems is also negative for chest pain

, shortness of breath, changes in vision, changes in hearing, new onset weakness

, abdominal pain, diarrhea, extreme fatigue, malaise, fever, skin changes, 

homicidal or suicidal ideation, or bowel or bladder incontinence.





Vital Signs:  Reviewed in EMR


Gen:  AAOx3, NAD


HEENT:  PERRLA,hearing grossly normal


Pulm:  resp unlabored











Neck:  supple, trachea midline








Neuro:  CN II-XII grossly intact,


The patient has absent deep tendon reflexes in the lower extremities.  Muscle 

strength exam the lower extremities showed decreased hip flexion to 3 out of 5 

bilaterally and decreased knee flexion and extension to 4 out of 5 bilaterally 

but normal ankle flexion and extension.  Internal and external rotation of the 

left hip joint caused mild pain.  She has tenderness in the lumbar 

paravertebral area and the left buttock area.





Imaging:  Reviewed in EMR/chart





Assessment:


Lumbar postlaminectomy pain syndrome


Lumbar DDD


Neuro foraminal stenosis











Plan:





1. Explanation:  Opioid and psychological risk scores were reviewed.  Diagnoses

, prognoses, and multiple treatment options including but not limited to 

physical therapy, interventional therapies, adjuvant medical therapies, 

narcotic medication therapies, and surgery were discussed with the patient and 

all questions were answered to the patient's satisfaction.





2. Opioid agreement: Signed with the patient and the patient is warned not to 

use opioids while driving or before driving and not to combine opioids with 

benzodiazepines or alcohol.





3. Counseling:  The patient was counseled extensively on SMOKING CESSATION, 

BODY MASS INDEX, EXERCISE.  Specifically, the patient was instructed regarding 

the importance of smoking cessation, obesity, and exercise in the context of 

both chronic pain and overall health.





4. Procedures:   schedule for transforaminal epidural steroid injection at the 

L2-3 and L3-4 levels on the left side under fluoroscopic guidance





5. Consultations:  None





6. Investigations:  None





7. Medications: Continue oxycodone 10 mg 3 times a day if needed for pain 





8. Disposition: Procedure in the near future 





9.  Maps were reviewed and were appropriate. MME/Day:45 mg





PQRS measures:





1-Patient's medications are documented in the chart.


2-Tobacco use is negative


3-Patient has had a pneumococcal vaccine.


4-Advanced care planning discussed, patient unable to give.


5-Opioid contract NOT signed with the patient as we do not prescribe fo rher


6-Pain positive, follow-up visit or procedure scheduled


7-Patient's blood pressure measured and documented, within normal limits


8-Patient's weight was measured, and body mass index ABOVE the normal limits, 

and counseling was done.  Patient instructed to follow up with PCP.


9-Patient WAS NOT identified as an unhealthy alcohol user.








Objective





- Vital Signs


Vital signs: 


 Vital Signs











Temp      


 


Pulse  63   07/09/18 13:23


 


Resp  16   07/09/18 13:23


 


BP  174/74   07/09/18 13:23


 


Pulse Ox  96   07/09/18 13:23








 Intake & Output











 07/08/18 07/09/18 07/09/18





 18:59 06:59 18:59


 


Weight   63.503 kg

## 2018-07-25 ENCOUNTER — HOSPITAL ENCOUNTER (OUTPATIENT)
Dept: HOSPITAL 47 - ORPAIN | Age: 73
Discharge: HOME | End: 2018-07-25
Attending: ANESTHESIOLOGY
Payer: MEDICARE

## 2018-07-25 VITALS — RESPIRATION RATE: 18 BRPM

## 2018-07-25 VITALS — HEART RATE: 60 BPM | DIASTOLIC BLOOD PRESSURE: 75 MMHG | SYSTOLIC BLOOD PRESSURE: 176 MMHG

## 2018-07-25 VITALS — TEMPERATURE: 98.1 F

## 2018-07-25 VITALS — BODY MASS INDEX: 21.7 KG/M2

## 2018-07-25 DIAGNOSIS — M96.1: ICD-10-CM

## 2018-07-25 DIAGNOSIS — M48.00: ICD-10-CM

## 2018-07-25 DIAGNOSIS — M51.16: Primary | ICD-10-CM

## 2018-07-25 PROCEDURE — 99152 MOD SED SAME PHYS/QHP 5/>YRS: CPT

## 2018-07-25 PROCEDURE — 64484 NJX AA&/STRD TFRM EPI L/S EA: CPT

## 2018-07-25 PROCEDURE — 64483 NJX AA&/STRD TFRM EPI L/S 1: CPT

## 2018-07-25 NOTE — P.PCN
Date of Procedure: 07/25/18


Description of Procedure: 


DESCRIPTION OF PROCEDURE(S):











PREOPERATIVE DIAGNOSIS: Lumbar radiculopathy





POSTOPERATIVE DIAGNOSIS: Lumbar radiculopathy





PROCEDURE





1. Transforaminal epidural steroid injection under fluoroscopic guidance left L2

-L3, L3-L4


2. Lumbar epidurogram





ANESTHESIA: Local with 1% lidocaine 3 ml ; IV sedation with Versed 2 mg  and 

fentanyl 100 micrograms.





PROCEDURE INDICATION: The patient with low back pain and radiculopathy symptoms 

unresponsive to conservative treatment.





PROCEDURE DESCRIPTION / TECHNIQUE: 





The patient was seen and identified in the preoperative area. Risks, benefits, 

complications, and alternatives were discussed with the patient. The patient 

agreed to proceed with the procedure and signed the consent. IV was started, 

and vital signs were stable.





Patient was taken to the OR and time out was completed. The patient was placed  

in the prone position on procedure table and a pillow was placed under the 

abdomen to reduce lumbar lordosis. The  lumbosacral area was prepped  and 

draped in the usual sterile fashion.  Vital signs were closely monitored during 

the procedure. Conscious sedation was used.





Using oblique fluoroscopy, the chin of the ``Moise dog at left L2 pedicle 

and the skin and deeper tissues just below was localized with 1% lidocaine. 

Subsequently, a 22-gauge 3.5-inch spinal needle was advanced under a tunneled 

view fluoroscopic guidance just underneath the chin of the ``Mosie dog left 

L2. Under lateral fluoroscopy, the needle was then advanced to the posterior 

border of the L2-L3 interforaminal  space. After negative aspiration of CSF and 

blood and with no paresthesias, 1 mL of Omnipaque-240 contrast dye was injected 

excellent epidurogram and outlining L2 nerve root.  Subsequently, of the 10ml 

solution consisting of 10mg/ml dexamethasone with 8 ml PF normal saline, and 1 

ml 0.25% marcaine , 5ml injected at L2-L3 space.   Needle was removed and the 

same procedure was repeated at the left L3-L4 space. At the end of the procedure

, skin was cleansed, and bandages were applied.





COMPLICATIONS: None





COMMENTS:





DISPOSITION / PLANS: The patient was placed in a supine position and 

transferred to the recovery area in a stable condition for observation. There 

was no evidence of lower extremity motor or sensory deficit after the 

procedure.  Patient was discharged from the recovery room after meeting 

discharge criteria. Home discharge instructions were given to the patient by 

the staff. The patient was reexamined prior to discharge.  We'll repeat 

procedure in 4 weeks time

## 2018-09-04 ENCOUNTER — HOSPITAL ENCOUNTER (OUTPATIENT)
Dept: HOSPITAL 47 - PNWHC3 | Age: 73
Discharge: HOME | End: 2018-09-04
Payer: MEDICARE

## 2018-09-04 VITALS — SYSTOLIC BLOOD PRESSURE: 157 MMHG | RESPIRATION RATE: 18 BRPM | HEART RATE: 67 BPM | DIASTOLIC BLOOD PRESSURE: 69 MMHG

## 2018-09-04 DIAGNOSIS — Z79.82: ICD-10-CM

## 2018-09-04 DIAGNOSIS — M25.552: ICD-10-CM

## 2018-09-04 DIAGNOSIS — Z79.1: ICD-10-CM

## 2018-09-04 DIAGNOSIS — M25.551: ICD-10-CM

## 2018-09-04 DIAGNOSIS — G89.29: Primary | ICD-10-CM

## 2018-09-04 DIAGNOSIS — M54.16: ICD-10-CM

## 2018-09-04 DIAGNOSIS — M96.1: ICD-10-CM

## 2018-09-04 DIAGNOSIS — F17.200: ICD-10-CM

## 2018-09-04 DIAGNOSIS — Z79.899: ICD-10-CM

## 2018-09-04 PROCEDURE — 99211 OFF/OP EST MAY X REQ PHY/QHP: CPT

## 2018-09-05 NOTE — P.PAINPG
Subjective


Progress Note Date: 09/04/18


This is 72 years old female with a chronic history of severe low back pain 

patient diagnosed with post laminectomy pain syndrome lumbar area and lumbar 

radiculopathy, with done, the epidural steroid injection with lysis of epidural 

adhesions she had very minimal benefit and only for short-term and later on we 

did transforaminal epidural steroid injection at L2-3 and at L3-4, she had also 

minimal benefit from, she continued to have severe low back pain with radiation 

to the hip area bilaterally, the pain increases with any activity currently she 

continued to take pain medication oxycodone 10 mg every 8 hours, and Motrin 800 

mg 3 times a day, she denies any fever or night sweats she denies any change in 

the bowel movement or urination, she had no motor or sensory deficits, and she 

reports a current pain medication helping her to do activity of daily livings, 

she denies any suicidal ideation





Objective





- Vital Signs


Vital signs: 


 Vital Signs











Temp  8 F L  09/04/18 13:58


 


Pulse  67   09/04/18 13:58


 


Resp  18   09/04/18 13:58


 


BP  157/69   09/04/18 13:58


 


Pulse Ox      








 Intake & Output











 09/04/18 09/05/18 09/05/18





 18:59 06:59 18:59


 


Weight 61.235 kg  














- Exam


    


  Physical Examinations  :


    1-Constitutiona             : Cooperative , not in acute distress .


     2-HEENT                      :  nech ;  supple ,  no Lymphadenopathy  , 

normal  thyroid  size .


                                                eyes  :  no ptosis , no icterus

,  no photophobia .  


                                                ENT  : normal    of hearing  , 

normal  oropharynx     , no Thrush .  


    3- Respiratory              : Chest clear to auscultations Bilaterally  ,  

no wheezing   , no Rhonchi   .  


    4- Cardiovascular       : regular rate and rhythem , S1 ,  S2  ,   no  S3 ,

  no  S4.


    5- Gastrointestinal       :  abdomen soft  no tenderness , bowel sounds  , 

no organomegally  .


    6- Genitourinary           :   Defferred .                                 

                                                                               

                                                                               

                                                                               

                                                                               

                       


    7- neurologic                 :   Cranial nerve II   to  XII  intact ,  no 

  focal neurological deffecit  .


    8-psychatric                  : alert ,  oriented  X 3  ,   appropriate 

affect   , intact judgment  and insight  .  


    9-Lymphatic                  :    no Lymphadenopathy .


   10- musculoskeltal       :     


                              Lumber spine


                                    moter stegnth lower extremities ,thigh and 

legs  5/5 Right side ,  5/5  Left side 


                                    deep tendon reflexes :   normal  Knee Jerk 

   , normal   ankle Jerk  


                                    positive  lumber facet Loading Test 


                                    Range of motion of the lumbar spine  

Flexion  30 degrees,   extension   10 degrees


                                    strait leg raising test  , positive at   30

   degree   


                                    Fabere test positive RT    and  positive  

LT .


                                    Sever tenderness over the  Sacroiliac joint

  on the R and L sides 


                                   Hip joint abduction, associated with severe 

pain  





Assessment and Plan


Plan: 


Assessment and plan=


   chronic low back pain secondary to lumbar post laminectomy pain syndrome, 

lumbar radiculopathic


   Bilateral hip arthralgia.


   Patient had minimal benefit from interventional pain management procedure 

caudal epidural steroid injections with lysis of epidural adhesions and short-

term benefits from transforaminal epidural


   chronic and current  use of high-risk medication (opioids)


   Patient denies any side effects of the current pain medication  and the 

current treatment/medication helping the  patient to do activity of daily 

living ,


   Diagnoses, prognosis, treatment options, including but not limited to 

physical therapy, medication management, interventional therapies, and surgery, 

were discussed with the patient


   All the questions answered


   The narcotic consent was signed and patient agreed and understood the side 

effects and complications of opioid treatment.


   Patient signed the narcotic agreement, and  was orally counseled, not to 

overuse, not to abuse, not to Divert , not tp sell pain medication, and to take 

it as prescribed only,


   Patient was counseled not to drive or operate heavy equipment while using 

narcotic medication, and advised not to use alcohol or any Illicit  drugs while 

using the narcotis, the patient's verbalized 


   understanding that lack of compliance with any of the above instructions, 

will likely to cause discharge from, the pain service, not to renew his 

narcotic prescriptions


   MAPS Reviwed and it was apropriate .


   Medication managements= patient will be given prescription refills for 

oxycodone 10 mg every 8 hours dispense 90 with 1 refill, Motrin 800 mg every 8 

hours dispense 90 with 1 refill


    MAPS ok 


  [facility] for evaluation regarding 

bilateral hip arthralgia patient saw Dr. Viera in the past and she will follow 

up with him .


  UDS next visit.


               , 


Time with Patient: Less than 30





PQRS Measure Charge Sheet


Measure #130: Documentation of Current Meds in Medical Chart: Patient's 

medications documented in chart


Measure #226: Tobacco Use: Screen & Cessation Intervention: Pt screened for 

tobacco use AND intervention given


Measure #111: Pneumonia Vaccination: Pneumococcal vaccine NOT administered or 

previously given


Measure #47: Advance Care Plan: Advance care planning discussed & documented, 

pt chose/unable to give


Measure #412: Opioid Treatment Agreement: Documented signed opioid trtmnt 

agreemnt min once during opioid trtmnt


Measure #408: Opioid Therapy Follow-up Evaluation: Patient had f/u eval minimum 

every 3 months during opioid therapy


Measure #317: Preventitive Care & Scrn High Bld Press & F/U: Pre-hypertensive 

or hypertensive BP documented, pt will f/u with PCP


Measure #128: Body Mass Index (BMI) Screening & Follow-up: BMI documented 

within normal parameters


Measure #131: Pain Assessment & Follow-up: Pain positive & plan documented, 

Follow-up scheduled


Measure #431: Unhealthy Alcohol Use Preventative Care & Scrn: Patient not 

identified as an unhealthy alcohol user


PQRS Narrative: 


 





Smoking Status                   Current every day smoker


Do You Want the Pneumonia        No


Vaccine AT THIS TIME?            


Narcotic Agreement Date Signed   05/14/18


Blood Pressure                   157/69


Pain Intensity [Left Hip]        8


Scale Used                       Numeric (1 - 10)


Hx Alcohol Use (MH)              No








Home Medications: 


Ambulatory Orders





Montelukast Sodium [Singulair] 10 mg PO HS@2100 09/20/14 


Metoprolol Tartrate [Lopressor] 25 mg PO BID 12/05/15 


Nitroglycerin Sl Tabs [Nitrostat] 0.4 mg SUBLINGUAL Q5M PRN 12/05/15 


Atorvastatin [Lipitor] 40 mg PO HS 12/06/15 


Ipratropium-Albuterol Nebulize [Duoneb 0.5 mg-3 mg/3 ml Soln] 3 ml INHALATION RT

-Q6H PRN 03/15/17 


Omeprazole 40 mg PO DAILY@0600 03/15/17 


hydrALAZINE HCL [Apresoline] 75 mg PO BID@0900,2100 03/15/17 


Losartan Potassium [Cozaar] 100 mg PO DAILY@0900 04/04/17 


Potassium Chloride ER [K-Dur 20] 20 meq PO DAILY 04/04/17 


Aspirin 81 mg PO BID #0 05/17/17 


Diltiazem Oral [Cardizem*] 60 mg PO TID  tab 05/17/17 


Sucralfate [Carafate] 1 gm PO DAILY 10/16/17 


Magnesium Oxide 1 tab PO BID 11/06/17 


oxyCODONE HCL 10 mg PO Q8HR PRN 06/11/18 


Ibuprofen [Motrin] 800 mg PO TID PRN 07/09/18 











Controlled Substance Measures





- Controlled Substance Measures


Is patient prescribed a controlled substance at discharge?: Yes


When asked, does pt state using other controlled substances?: No


If prescribed controlled substance>3 days was MAPS reviewed?: Yes


If Rx opioid, was Start Talking consent form obtained?: Yes


If opioid is for acute pain is fill amount 7 days or less?: No


Was information provided regarding opioid addiction?: Yes

## 2018-09-05 NOTE — FL
EXAMINATION TYPE: FL guided pain mgmt statistic

 

DATE OF EXAM: 6/11/2018

 

HISTORY: Flouroscopy  time

 

15 seconds of fluoroscopy provided. 

 

IMPRESSION:

1. Fluoroscopy time. Dr Lauro Rodriguez Cardiologist [phone]; Dr Andre Shaw Neurologist [phone]

## 2021-10-06 NOTE — FL
EXAMINATION TYPE: FL guided pain mgmt statistic

 

DATE OF EXAM: 7/25/2018

 

HISTORY: Flouroscopy  time

 

59 seconds of fluoroscopy provided. 

 

IMPRESSION:

1. Fluoroscopy time. Statement Selected